# Patient Record
Sex: FEMALE | Race: WHITE | NOT HISPANIC OR LATINO | Employment: UNEMPLOYED | URBAN - METROPOLITAN AREA
[De-identification: names, ages, dates, MRNs, and addresses within clinical notes are randomized per-mention and may not be internally consistent; named-entity substitution may affect disease eponyms.]

---

## 2017-11-24 ENCOUNTER — ALLSCRIPTS OFFICE VISIT (OUTPATIENT)
Dept: OTHER | Facility: OTHER | Age: 37
End: 2017-11-24

## 2017-11-25 NOTE — PROGRESS NOTES
Assessment    1  Encounter for annual routine gynecological examination (V72 31) (Z01 419)    Plan  Obtain patient's old records so that we can get better information on her history of abnormal Pap smears and ovarian cyst   Patient will be notified only if there is further intervention that is needed  Discussion/Summary  health maintenance visit healthy adult female Currently, she eats a healthy diet  cervical cancer screening is current Advice and education were given regarding contraception  The patient has the current Goals: Patient is at goal    Patient is able to Self-Care  PATIENT EDUCATION RECORD  Indication for Services: Routine annual gyn exam    Possible side effects of new medications were reviewed with the patient/guardian today  The treatment plan was reviewed with the patient/guardian  The patient/guardian understands and agrees with the treatment plan      Chief Complaint    1  Visit For: Preventive General Multisystem Exam    History of Present Illness  GYN HM, [facility]: The patient is being seen for a health maintenance and gynecology evaluation  General Health: The patient's health since the last visit is described as good  She has regular dental visits  -- She denies vision problems  -- She denies hearing loss  Immunizations status: up to date  Lifestyle:  She consumes a diverse and healthy diet  -- She does not have any weight concerns  -- She exercises regularly  -- She does not use tobacco -- She denies alcohol use  -- She denies drug use  Reproductive health: the patient is premenopausal--   she reports no menstrual problems  -- she uses no contraception  -- she denies prior pregnancies  Screening: cancer screening reviewed and current  metabolic screening reviewed and current  risk screening reviewed and current  Additional History:  Nolan Pelaez is seen as a new patient today  She has no specific complaints or concerns   She feels she may have had a Pap smear performed this year or last  She has had some abnormal Paps in the past which apparently were simply repeated  She also has had a history of ovarian cysts with follow-up ultrasounds  Unfortunately none of the records are available for review  Review of Systems   Constitutional: No fever, no chills, feels well, no tiredness, no recent weight gain or loss  ENT: no ear ache, no loss of hearing, no nosebleeds or nasal discharge, no sore throat or hoarseness  Cardiovascular: no complaints of slow or fast heart rate, no chest pain, no palpitations, no leg claudication or lower extremity edema  Respiratory: no complaints of shortness of breath, no wheezing, no dyspnea on exertion, no orthopnea or PND  Breasts: no complaints of breast pain, breast lump or nipple discharge  Gastrointestinal: no complaints of abdominal pain, no constipation, no nausea or diarrhea, no vomiting, no bloody stools  Genitourinary: no complaints of dysuria, no incontinence, no pelvic pain, no dysmenorrhea, no vaginal discharge or abnormal vaginal bleeding  Musculoskeletal: no complaints of arthralgia, no myalgia, no joint swelling or stiffness, no limb pain or swelling  Integumentary: no complaints of skin rash or lesion, no itching or dry skin, no skin wounds  Neurological: no complaints of headache, no confusion, no numbness or tingling, no dizziness or fainting  Active Problems  1   Atrial fibrillation (427 31) (I48 91)    Past Medical History   · History of self breast exam    Surgical History   · History of Cataract Surgery   · History of Catheter Ablation Atrial Fibrillation    Family History  Family History    · Family history of cardiac disorder (V17 49) (Z82 49)   · Family history of malignant neoplasm (V16 9) (Z80 9)   · Family history of thyroid disease (V18 19) (Z83 49)    Social History     · Always uses seat belt   · Caffeine use (V49 89) (F15 90)   · Has no children   · Never a smoker   · Social alcohol use (Z78 9)    Current Meds   1  Aspirin Low Dose 81 MG TABS; Therapy: (Recorded:17Vap4127) to Recorded   2  Metoprolol Succinate ER 25 MG Oral Tablet Extended Release 24 Hour; Therapy: [id number] to Recorded    Allergies  1  No Known Drug Allergies    Vitals   Recorded: 31VTW2396 23:93BV   Systolic 113, LUE, Sitting   Diastolic 76, LUE, Sitting   Height 5 ft 2 in   Weight 174 lb 6 oz   BMI Calculated 31 89   BSA Calculated 1 8   LMP [id number]       Physical Exam   Constitutional  General appearance: No acute distress, well appearing and well nourished  Neck  Neck: Normal, supple, trachea midline, no masses  Thyroid: Normal, no thyromegaly  Genitourinary  External genitalia: Normal and no lesions appreciated  Vagina: Normal, no lesions or dryness appreciated  Urethra: Normal    Urethral meatus: Normal    Bladder: Normal, soft, non-tender and no prolapse or masses appreciated  Cervix: Normal, no palpable masses  Examination of the cervix revealed normal findings  A Pap smear was not performed  Bimanual exam findings include a patent cervical os  Uterus: Normal, non-tender, not enlarged, and no palpable masses  Adnexa/parametria: Normal, non-tender and no fullness or masses appreciated  Chest  Breasts: Normal and no dimpling or skin changes noted  Abdomen  Abdomen: Normal, non-tender, and no organomegaly noted  Liver and spleen: No hepatomegaly or splenomegaly  Examination for hernias: No hernias appreciated  Lymphatic  Palpation of lymph nodes in neck, axillae, groin and/or other locations: No lymphadenopathy or masses noted  Skin  Skin and subcutaneous tissue: Normal skin turgor and no rashes  Palpation of skin and subcutaneous tissue: Normal    Psychiatric  Orientation to person, place, and time: Normal    Mood and affect: Normal        Signatures   Electronically signed by :  Brooklynn Tran MD; Nov 24 2017 12:28PM EST                       (Author)

## 2017-11-29 ENCOUNTER — GENERIC CONVERSION - ENCOUNTER (OUTPATIENT)
Dept: OTHER | Facility: OTHER | Age: 37
End: 2017-11-29

## 2017-11-29 DIAGNOSIS — R10.2 PELVIC AND PERINEAL PAIN: ICD-10-CM

## 2017-12-18 ENCOUNTER — HOSPITAL ENCOUNTER (OUTPATIENT)
Dept: ULTRASOUND IMAGING | Facility: CLINIC | Age: 37
Discharge: HOME | End: 2017-12-18
Attending: OBSTETRICS & GYNECOLOGY
Payer: COMMERCIAL

## 2017-12-18 DIAGNOSIS — R10.2 PELVIC AND PERINEAL PAIN: ICD-10-CM

## 2017-12-18 PROCEDURE — 76856 US EXAM PELVIC COMPLETE: CPT

## 2017-12-18 PROCEDURE — 76830 TRANSVAGINAL US NON-OB: CPT

## 2017-12-22 ENCOUNTER — ALLSCRIPTS OFFICE VISIT (OUTPATIENT)
Dept: OTHER | Facility: OTHER | Age: 37
End: 2017-12-22

## 2017-12-26 ENCOUNTER — GENERIC CONVERSION - ENCOUNTER (OUTPATIENT)
Dept: OTHER | Facility: OTHER | Age: 37
End: 2017-12-26

## 2017-12-28 LAB
ADEQUACY: (HISTORICAL): NORMAL
CLINICIAN PROVIDIED ICD 9 OR 10 (HISTORICAL): NORMAL
COMMENT (HISTORICAL): NORMAL
DIAGNOSIS (HISTORICAL): NORMAL
HPV HIGH RISK (HISTORICAL): NEGATIVE
PERFORMED BY (HISTORICAL): NORMAL
TEST INFORMATION (HISTORICAL): NORMAL

## 2018-01-05 RX ORDER — METOPROLOL SUCCINATE 25 MG/1
25 TABLET, EXTENDED RELEASE ORAL DAILY
COMMUNITY

## 2018-01-13 VITALS
WEIGHT: 174.38 LBS | BODY MASS INDEX: 32.09 KG/M2 | HEIGHT: 62 IN | SYSTOLIC BLOOD PRESSURE: 116 MMHG | DIASTOLIC BLOOD PRESSURE: 76 MMHG

## 2018-01-15 NOTE — MISCELLANEOUS
Message   Recorded as Task   Date: 11/28/2017 04:31 PM, Created By: Richardo Leventhal   Task Name: Care Coordination   Assigned To: Alicia Hidalgo   Regarding Patient: Candy Almeida, Status: Active   Comment:    Harley Darling - 28 Nov 2017 4:31 PM     TASK CREATED  Patient's old records were reviewed  The patient is in need of a pelvic ultrasound as well as a Pap smear since there were no Pap smears included in her records  She should make an appointment at the [place] [address] office to have both performed  Ashleigh Dowell - 29 Nov 2017 11:16 AM     TASK EDITED  pt scheduled appt           Active Problems    1  Atrial fibrillation (427 31) (I48 91)   2  Encounter for annual routine gynecological examination (V72 31) (Z01 419)    Current Meds   1  Aspirin Low Dose 81 MG TABS; Therapy: (Recorded:80Byw4996) to Recorded   2  Metoprolol Succinate ER 25 MG Oral Tablet Extended Release 24 Hour; Therapy: [id number] to Recorded    Allergies    1   No Known Drug Allergies    Signatures   Electronically signed by : Anita Birmingham, ; Nov 29 2017 11:16AM EST                       (Author)

## 2018-01-18 ENCOUNTER — GENERIC CONVERSION - ENCOUNTER (OUTPATIENT)
Dept: OTHER | Facility: OTHER | Age: 38
End: 2018-01-18

## 2018-01-23 VITALS
BODY MASS INDEX: 32.04 KG/M2 | DIASTOLIC BLOOD PRESSURE: 66 MMHG | WEIGHT: 174.13 LBS | SYSTOLIC BLOOD PRESSURE: 116 MMHG | HEIGHT: 62 IN

## 2018-01-23 NOTE — MISCELLANEOUS
Message   Recorded as Task   Date: 12/20/2017 08:09 AM, Created By: Nico Guerrero   Task Name: Care Coordination   Assigned To: Alexandra Gabriel   Regarding Patient: Prateek Forde, Status: Active   Comment:    LissetHarley - 20 Dec 2017 8:09 AM     TASK CREATED  Patient also needs an office visit to schedule surgery for removal of the left adnexal cyst    Ashleigh Dowell - 20 Dec 2017 4:18 PM     TASK REASSIGNED: Previously Assigned To [facility] - 26 Dec 2017 12:32 PM     TASK REPLIED TO: Previously Assigned To Gemma Gonsales  She has an appointment with me on 01/02/2018        Active Problems    1  Atrial fibrillation (427 31) (I48 91)   2  Cervical cancer screening (V76 2) (Z12 4)   3  Encounter for annual routine gynecological examination (V72 31) (Z01 419)   4  Female pelvic pain (625 9) (R10 2)   5  Ovarian enlargement, left (620 8) (N83 8)   6  Screening for HPV (human papillomavirus) (V73 81) (Z11 51)    Current Meds   1  Aspirin Low Dose 81 MG TABS; Therapy: (Recorded:28Qwz4250) to Recorded   2  Metoprolol Succinate ER 25 MG Oral Tablet Extended Release 24 Hour; Therapy: [id number] to Recorded    Allergies    1   No Known Drug Allergies    Signatures   Electronically signed by : Surya Morales, ; Dec 26 2017 12:45PM EST                       (Author)

## 2018-01-31 ENCOUNTER — HOSPITAL ENCOUNTER (OUTPATIENT)
Dept: NON INVASIVE DIAGNOSTICS | Facility: HOSPITAL | Age: 38
Discharge: HOME | End: 2018-01-31
Attending: OBSTETRICS & GYNECOLOGY
Payer: COMMERCIAL

## 2018-01-31 ENCOUNTER — APPOINTMENT (OUTPATIENT)
Dept: LAB | Facility: HOSPITAL | Age: 38
End: 2018-01-31
Attending: OBSTETRICS & GYNECOLOGY
Payer: COMMERCIAL

## 2018-01-31 ENCOUNTER — TRANSCRIBE ORDERS (OUTPATIENT)
Dept: ADMINISTRATIVE | Facility: HOSPITAL | Age: 38
End: 2018-01-31

## 2018-01-31 DIAGNOSIS — N94.9 DISEASE OF FEMALE GENITAL ORGANS: ICD-10-CM

## 2018-01-31 DIAGNOSIS — N94.9 DISEASE OF FEMALE GENITAL ORGANS: Primary | ICD-10-CM

## 2018-01-31 LAB
ABO GROUP BLD: NORMAL
ATRIAL RATE: 67 BPM
BASOPHILS # BLD AUTO: 0.03 THOUSANDS/ΜL (ref 0–0.1)
BASOPHILS NFR BLD AUTO: 0 % (ref 0–1)
BLD GP AB SCN SERPL QL: NEGATIVE
EOSINOPHIL # BLD AUTO: 0.12 THOUSAND/ΜL (ref 0–0.61)
EOSINOPHIL NFR BLD AUTO: 2 % (ref 0–6)
ERYTHROCYTE [DISTWIDTH] IN BLOOD BY AUTOMATED COUNT: 14.3 % (ref 11.6–15.1)
HCT VFR BLD AUTO: 45.8 % (ref 34.8–46.1)
HGB BLD-MCNC: 14.8 G/DL (ref 11.5–15.4)
LYMPHOCYTES # BLD AUTO: 2.22 THOUSANDS/ΜL (ref 0.6–4.47)
LYMPHOCYTES NFR BLD AUTO: 31 % (ref 14–44)
MCH RBC QN AUTO: 28.1 PG (ref 26.8–34.3)
MCHC RBC AUTO-ENTMCNC: 32.3 G/DL (ref 31.4–37.4)
MCV RBC AUTO: 87 FL (ref 82–98)
MONOCYTES # BLD AUTO: 0.33 THOUSAND/ΜL (ref 0.17–1.22)
MONOCYTES NFR BLD AUTO: 5 % (ref 4–12)
NEUTROPHILS # BLD AUTO: 4.46 THOUSANDS/ΜL (ref 1.85–7.62)
NEUTS SEG NFR BLD AUTO: 62 % (ref 43–75)
P AXIS: 38 DEGREES
PLATELET # BLD AUTO: 220 THOUSANDS/UL (ref 149–390)
PMV BLD AUTO: 9.6 FL (ref 8.9–12.7)
PR INTERVAL: 174 MS
QRS AXIS: 69 DEGREES
QRSD INTERVAL: 88 MS
QT INTERVAL: 406 MS
QTC INTERVAL: 429 MS
RBC # BLD AUTO: 5.26 MILLION/UL (ref 3.81–5.12)
RH BLD: POSITIVE
SPECIMEN EXPIRATION DATE: NORMAL
T WAVE AXIS: 25 DEGREES
VENTRICULAR RATE: 67 BPM
WBC # BLD AUTO: 7.16 THOUSAND/UL (ref 4.31–10.16)

## 2018-01-31 PROCEDURE — 86900 BLOOD TYPING SEROLOGIC ABO: CPT

## 2018-01-31 PROCEDURE — 85025 COMPLETE CBC W/AUTO DIFF WBC: CPT

## 2018-01-31 PROCEDURE — 93005 ELECTROCARDIOGRAM TRACING: CPT

## 2018-01-31 PROCEDURE — 86901 BLOOD TYPING SEROLOGIC RH(D): CPT

## 2018-01-31 PROCEDURE — 36415 COLL VENOUS BLD VENIPUNCTURE: CPT

## 2018-01-31 PROCEDURE — 86850 RBC ANTIBODY SCREEN: CPT

## 2018-02-01 ENCOUNTER — ANESTHESIA EVENT (OUTPATIENT)
Dept: PERIOP | Facility: HOSPITAL | Age: 38
End: 2018-02-01
Payer: COMMERCIAL

## 2018-02-01 RX ORDER — SODIUM CHLORIDE, SODIUM LACTATE, POTASSIUM CHLORIDE, CALCIUM CHLORIDE 600; 310; 30; 20 MG/100ML; MG/100ML; MG/100ML; MG/100ML
20 INJECTION, SOLUTION INTRAVENOUS CONTINUOUS
Status: CANCELLED | OUTPATIENT
Start: 2018-02-02

## 2018-02-01 NOTE — PRE-PROCEDURE INSTRUCTIONS
Pre-Surgery Instructions:   Medication Instructions    aspirin 81 MG tablet Patient was instructed by Physician and understands   metoprolol succinate (TOPROL-XL) 25 mg 24 hr tablet Instructed patient per Anesthesia Guidelines  Before your operation, you play an important role in decreasing your risk for infection by washing with special antiseptic soap  This is an effective way to reduce bacteria on the skin which may help to prevent infections at the surgical site  Please read the following directions in advance  1  In the week before your operation purchase a 4 ounce bottle of antiseptic soap containing chlorhexidine gluconate 4%  Some brand names include: Aplicare, Endure, and Hibiclens  The cost is usually less than $5 00  · For your convenience, the [address] [place] [address] carries the soap  · It may also be available at your doctor's office or pre-admission testing center, and at most retail pharmacies  · If you are allergic or sensitive to soaps containing chlorhexidine gluconate (CHG), please let your doctor know so another antiseptic soap can be suggested  · CHG antiseptic soap is for external use only  2      The day before your operation follow these directions carefully to get ready  · Place clean lines (sheets) on your bed; you should sleep on clean sheets after your evening shower  · Get clean towels and washcloths ready - you need enough for 2 showers  · Set aside clean underwear, pajamas, and clothing to wear after the shower  Reminders:  · DO NOT use any other soap or body rinse on your skin during or after the antiseptic showers  · DO NOT use lotion , powder, deodorant, or perfume/aftershave of any kind on your skin after your antiseptic shower  · DO NOT shave any body parts in the 24 hours/the day before your operation  · DO NOT get the antiseptic soap in your eyes, ears, nose, mouth, or vaginal area      3      You will need to shower the night before AND the morning of your Surgery  Shower 1:  · The evening before your operation, take the fist shower  · First, shampoo your hair with regular shampoo and rinse it completely before you use the anitseptic soap  After washing and rinsing your hair, rinse your body  · Next, use a clean wash cloth to apply the antiseptic soap and wash your body from the neck down to your toes using 1/2 bottle of the antiseptic soap  You will use the other 1/2 bottle for the second shower  · Clean the area where your incision will be; later this area well for about 2 minutes  · If you ar having head or neck surgery, wash areas with the antiseptic soap  · Rinse yourself completely with running water  · Use a clean towel to dry off  · Wear clean underwear and clothing/pajamas  Shower 2:  · The Morning of your operation, take the second shower following the same steps as Shower 1 using the second 1/2 of the bottle of antiseptic soap  · Use clean cloths and towels to was and dry yourself off  · Wear clean underwear and clothing

## 2018-02-02 ENCOUNTER — ANESTHESIA (OUTPATIENT)
Dept: PERIOP | Facility: HOSPITAL | Age: 38
End: 2018-02-02
Payer: COMMERCIAL

## 2018-02-02 ENCOUNTER — HOSPITAL ENCOUNTER (OUTPATIENT)
Facility: HOSPITAL | Age: 38
Setting detail: OUTPATIENT SURGERY
Discharge: HOME | End: 2018-02-02
Attending: OBSTETRICS & GYNECOLOGY | Admitting: OBSTETRICS & GYNECOLOGY
Payer: COMMERCIAL

## 2018-02-02 VITALS
HEART RATE: 56 BPM | WEIGHT: 174 LBS | TEMPERATURE: 97.7 F | RESPIRATION RATE: 16 BRPM | DIASTOLIC BLOOD PRESSURE: 57 MMHG | BODY MASS INDEX: 32.02 KG/M2 | SYSTOLIC BLOOD PRESSURE: 104 MMHG | OXYGEN SATURATION: 95 % | HEIGHT: 62 IN

## 2018-02-02 DIAGNOSIS — N94.9 UNSPECIFIED CONDITION ASSOCIATED WITH FEMALE GENITAL ORGANS AND MENSTRUAL CYCLE (CODE): ICD-10-CM

## 2018-02-02 PROBLEM — I48.91 ATRIAL FIBRILLATION (HCC): Status: ACTIVE | Noted: 2017-11-24

## 2018-02-02 PROBLEM — R10.2 FEMALE PELVIC PAIN: Status: ACTIVE | Noted: 2017-11-29

## 2018-02-02 LAB — EXT PREGNANCY TEST URINE: NEGATIVE

## 2018-02-02 PROCEDURE — 88305 TISSUE EXAM BY PATHOLOGIST: CPT | Performed by: PATHOLOGY

## 2018-02-02 PROCEDURE — 81025 URINE PREGNANCY TEST: CPT | Performed by: OBSTETRICS & GYNECOLOGY

## 2018-02-02 PROCEDURE — 88305 TISSUE EXAM BY PATHOLOGIST: CPT | Performed by: OBSTETRICS & GYNECOLOGY

## 2018-02-02 PROCEDURE — 58662 LAPAROSCOPY EXCISE LESIONS: CPT | Performed by: OBSTETRICS & GYNECOLOGY

## 2018-02-02 RX ORDER — MAGNESIUM HYDROXIDE 1200 MG/15ML
LIQUID ORAL AS NEEDED
Status: DISCONTINUED | OUTPATIENT
Start: 2018-02-02 | End: 2018-02-02 | Stop reason: HOSPADM

## 2018-02-02 RX ORDER — ONDANSETRON 2 MG/ML
INJECTION INTRAMUSCULAR; INTRAVENOUS AS NEEDED
Status: DISCONTINUED | OUTPATIENT
Start: 2018-02-02 | End: 2018-02-02 | Stop reason: SURG

## 2018-02-02 RX ORDER — IBUPROFEN 600 MG/1
600 TABLET ORAL EVERY 6 HOURS PRN
Status: DISCONTINUED | OUTPATIENT
Start: 2018-02-02 | End: 2018-02-02 | Stop reason: HOSPADM

## 2018-02-02 RX ORDER — PROPOFOL 10 MG/ML
INJECTION, EMULSION INTRAVENOUS AS NEEDED
Status: DISCONTINUED | OUTPATIENT
Start: 2018-02-02 | End: 2018-02-02 | Stop reason: SURG

## 2018-02-02 RX ORDER — KETOROLAC TROMETHAMINE 30 MG/ML
INJECTION, SOLUTION INTRAMUSCULAR; INTRAVENOUS AS NEEDED
Status: DISCONTINUED | OUTPATIENT
Start: 2018-02-02 | End: 2018-02-02 | Stop reason: SURG

## 2018-02-02 RX ORDER — FENTANYL CITRATE 50 UG/ML
INJECTION, SOLUTION INTRAMUSCULAR; INTRAVENOUS AS NEEDED
Status: DISCONTINUED | OUTPATIENT
Start: 2018-02-02 | End: 2018-02-02 | Stop reason: SURG

## 2018-02-02 RX ORDER — SODIUM CHLORIDE, SODIUM LACTATE, POTASSIUM CHLORIDE, CALCIUM CHLORIDE 600; 310; 30; 20 MG/100ML; MG/100ML; MG/100ML; MG/100ML
20 INJECTION, SOLUTION INTRAVENOUS CONTINUOUS
Status: DISCONTINUED | OUTPATIENT
Start: 2018-02-02 | End: 2018-02-02 | Stop reason: HOSPADM

## 2018-02-02 RX ORDER — FENTANYL CITRATE/PF 50 MCG/ML
25 SYRINGE (ML) INJECTION
Status: DISCONTINUED | OUTPATIENT
Start: 2018-02-02 | End: 2018-02-02 | Stop reason: HOSPADM

## 2018-02-02 RX ORDER — ROCURONIUM BROMIDE 10 MG/ML
INJECTION, SOLUTION INTRAVENOUS AS NEEDED
Status: DISCONTINUED | OUTPATIENT
Start: 2018-02-02 | End: 2018-02-02 | Stop reason: SURG

## 2018-02-02 RX ORDER — METOCLOPRAMIDE HYDROCHLORIDE 5 MG/ML
10 INJECTION INTRAMUSCULAR; INTRAVENOUS ONCE AS NEEDED
Status: DISCONTINUED | OUTPATIENT
Start: 2018-02-02 | End: 2018-02-02 | Stop reason: HOSPADM

## 2018-02-02 RX ORDER — MIDAZOLAM HYDROCHLORIDE 1 MG/ML
INJECTION INTRAMUSCULAR; INTRAVENOUS AS NEEDED
Status: DISCONTINUED | OUTPATIENT
Start: 2018-02-02 | End: 2018-02-02 | Stop reason: SURG

## 2018-02-02 RX ORDER — ACETAMINOPHEN 325 MG/1
650 TABLET ORAL EVERY 6 HOURS PRN
Status: DISCONTINUED | OUTPATIENT
Start: 2018-02-02 | End: 2018-02-02 | Stop reason: HOSPADM

## 2018-02-02 RX ADMIN — KETOROLAC TROMETHAMINE 30 MG: 30 INJECTION, SOLUTION INTRAMUSCULAR at 12:53

## 2018-02-02 RX ADMIN — FENTANYL CITRATE 50 MCG: 50 INJECTION, SOLUTION INTRAMUSCULAR; INTRAVENOUS at 12:05

## 2018-02-02 RX ADMIN — FENTANYL CITRATE 50 MCG: 50 INJECTION, SOLUTION INTRAMUSCULAR; INTRAVENOUS at 13:23

## 2018-02-02 RX ADMIN — ROCURONIUM BROMIDE 40 MG: 10 INJECTION INTRAVENOUS at 12:10

## 2018-02-02 RX ADMIN — PROPOFOL 150 MG: 10 INJECTION, EMULSION INTRAVENOUS at 12:08

## 2018-02-02 RX ADMIN — MIDAZOLAM HYDROCHLORIDE 2 MG: 1 INJECTION, SOLUTION INTRAMUSCULAR; INTRAVENOUS at 12:01

## 2018-02-02 RX ADMIN — ONDANSETRON 4 MG: 2 INJECTION INTRAMUSCULAR; INTRAVENOUS at 12:52

## 2018-02-02 RX ADMIN — SODIUM CHLORIDE, SODIUM LACTATE, POTASSIUM CHLORIDE, AND CALCIUM CHLORIDE 20 ML/HR: .6; .31; .03; .02 INJECTION, SOLUTION INTRAVENOUS at 11:05

## 2018-02-02 NOTE — H&P (VIEW-ONLY)
Discussion/Summary   Discussion Summary:    I discussed with Edel Sifuentes and her mom, the fact that for years there has been a left adnexal structure, ovary versus fallopian tube that is being watched  The most recent ultrasound seems to indicate this may be ovarian in origin, and seems to be enlarging  It is also septated  Because of these issues, I am recommending that Edel Sifuentes undergo a laparoscopy with removal of the structure so that it can be studied and at least eliminated for her future health  We reviewed the technique which would be laparoscopy  A pamphlet pill straighten this in addition to an instruction sheet regarding the least the most common risks was also given to her  The patient and I reviewed the risks and benefits, pros and cons of the procedure, including alternatives  Edel Sifuentes is in favor of having this removed and would prefer to have the procedure performed in [facility]  This would not be till the 1st Friday in February however this is been very chronic and I feel it is appropriate for her to wait till then  She will return in January to sign papers and finalize the surgical date  Counseling Documentation With Imm: The patient, patient's family was counseled regarding diagnostic results,-- prognosis,-- patient and family education,-- impressions,-- risks and benefits of treatment options  total time of encounter was 27 minutes-- and-- 19 minutes was spent counseling  Chief Complaint   Chief Complaint Free Text Note Form: PT HERE FOR US F/U AND PAP  History of Present Illness   HPI: Edel Sifuentes is here today for a Pap smear since we do not have any records of the Pap over the past several years and discussion about her slowly enlarging left adnexal cyst       Review of Systems   Focused-Female:      Constitutional: No fever, no chills, feels well, no tiredness, no recent weight gain or loss        ENT: no ear ache, no loss of hearing, no nosebleeds or nasal discharge, no sore throat or hoarseness  Cardiovascular: no complaints of slow or fast heart rate, no chest pain, no palpitations, no leg claudication or lower extremity edema  Respiratory: no complaints of shortness of breath, no wheezing, no dyspnea on exertion, no orthopnea or PND  Breasts: no complaints of breast pain, breast lump or nipple discharge  Gastrointestinal: no complaints of abdominal pain, no constipation, no nausea or diarrhea, no vomiting, no bloody stools  Genitourinary: no complaints of dysuria, no incontinence, no pelvic pain, no dysmenorrhea, no vaginal discharge or abnormal vaginal bleeding  Musculoskeletal: no complaints of arthralgia, no myalgia, no joint swelling or stiffness, no limb pain or swelling  Integumentary: no complaints of skin rash or lesion, no itching or dry skin, no skin wounds  Neurological: no complaints of headache, no confusion, no numbness or tingling, no dizziness or fainting  Active Problems   1  Atrial fibrillation (427 31) (I48 91)   2  Cervical cancer screening (V76 2) (Z12 4)   3  Encounter for annual routine gynecological examination (V72 31) (Z01 419)   4  Female pelvic pain (625 9) (R10 2)   5  Ovarian enlargement, left (620 8) (N83 8)   6  Screening for HPV (human papillomavirus) (V73 81) (Z11 51)    Past Medical History   1  History of self breast exam    Surgical History   1  History of Cataract Surgery   2  History of Catheter Ablation Atrial Fibrillation    Family History   Family History    1  Family history of cardiac disorder (V17 49) (Z82 49)   2  Family history of malignant neoplasm (V16 9) (Z80 9)   3  Family history of thyroid disease (V18 19) (Z83 49)    Social History    · Always uses seat belt   · Caffeine use (V49 89) (F15 90)   · Has no children   · Never a smoker   · Social alcohol use (Z78 9)    Current Meds    1  Aspirin Low Dose 81 MG TABS; Therapy: (Recorded:24Nov2017) to Recorded   2   Metoprolol Succinate ER 25 MG Oral Tablet Extended Release 24 Hour; Therapy: [id number] to Recorded    Allergies   1  No Known Drug Allergies    Physical Exam        Constitutional      General appearance: No acute distress, well appearing and well nourished  Neck      Neck: Normal, supple, trachea midline, no masses  Thyroid: Normal, no thyromegaly  Pulmonary      Respiratory effort: No increased work of breathing or signs of respiratory distress  Auscultation of lungs: Clear to auscultation  Cardiovascular      Auscultation of heart: Normal rate and rhythm, normal S1 and S2, no murmurs  Peripheral vascular exam: Normal pulses Throughout  Genitourinary      External genitalia: Normal and no lesions appreciated  Vagina: Normal, no lesions or dryness appreciated  Urethra: Normal        Urethral meatus: Normal        Bladder: Normal, soft, non-tender and no prolapse or masses appreciated  Cervix: Normal, no palpable masses  Examination of the cervix revealed normal findings  A Pap smear was performed  Bimanual exam findings include a patent cervical os  Psychiatric      Orientation to person, place, and time: Normal        Mood and affect: Normal        Future Appointments      Date/Time Provider Specialty Site   02/16/2018 12:30 PM Sariah Cochran MD Obstetrics/Gynecology [facility]   03/07/2018 08:30 AM [facility]     Signatures    Electronically signed by :  Dee Dee Fernando MD; Jan 18 2018  9:47AM EST                       (Author)

## 2018-02-02 NOTE — DISCHARGE INSTRUCTIONS
Laparoscopic Excision of Ovarian Cysts   WHAT YOU NEED TO KNOW:   Laparoscopic excision is surgery to remove a cyst on your ovary  DISCHARGE INSTRUCTIONS:   Medicines:   · Antibiotics: This medicine is given to fight or prevent an infection caused by bacteria  Always take your antibiotics exactly as ordered by your healthcare provider  Do not stop taking your medicine unless directed by your healthcare provider  Never save antibiotics or take leftover antibiotics that were given to you for another illness  · Pain medicine: You may be given a prescription medicine to decrease pain  Do not wait until the pain is severe before you take this medicine  · Take your medicine as directed  Contact your healthcare provider if you think your medicine is not helping or if you have side effects  Tell him or her if you are allergic to any medicine  Keep a list of the medicines, vitamins, and herbs you take  Include the amounts, and when and why you take them  Bring the list or the pill bottles to follow-up visits  Carry your medicine list with you in case of an emergency  Follow up with your healthcare provider as directed:  Write down your questions so you remember to ask them during your visits  Contact your healthcare provider if:   · You have stomach cramps  · You have a fever  · You have questions or concerns about your condition or care  Seek care immediately or call 911 if:   · You have bleeding that does not stop  · You have severe abdominal pain that does not go away, even with medicine  · You feel lightheaded and short of breath  · You have chest pain when you take a deep breath or cough, or you cough up blood  · Your arm or leg feels warm, tender, and painful  It may look swollen and red  © 2017 [facility] Information is for End User's use only and may not be sold, redistributed or otherwise used for commercial purposes   All illustrations and images included in CareNotes® are the copyrighted property of A D A M , Inc  or [employer]  The above information is an  only  It is not intended as medical advice for individual conditions or treatments  Talk to your doctor, nurse or pharmacist before following any medical regimen to see if it is safe and effective for you

## 2018-02-02 NOTE — ANESTHESIA PREPROCEDURE EVALUATION
Review of Systems/Medical History  Patient summary reviewed  Chart reviewed      Cardiovascular  Exercise tolerance: good,  Dysrhythmias, atrial fibrillation,    Pulmonary  Negative pulmonary ROS        GI/Hepatic  Negative GI/hepatic ROS          Negative  ROS        Endo/Other  Negative endo/other ROS      GYN      Comment: Ovarian cyst     Hematology  Negative hematology ROS      Musculoskeletal  Negative musculoskeletal ROS        Neurology  Negative neurology ROS      Psychology   Anxiety,              Physical Exam    Airway    Mallampati score: II  TM Distance: >3 FB  Neck ROM: full     Dental   No notable dental hx     Cardiovascular  Rhythm: regular, Rate: normal, Cardiovascular exam normal    Pulmonary  Pulmonary exam normal Breath sounds clear to auscultation,     Other Findings        Anesthesia Plan  ASA Score- 2     Anesthesia Type- general with ASA Monitors  Additional Monitors:   Airway Plan: ETT  Plan Factors-    Induction- intravenous  Postoperative Plan- Plan for postoperative opioid use  Informed Consent- Anesthetic plan and risks discussed with patient

## 2018-02-02 NOTE — OP NOTE
OPERATIVE REPORT  PATIENT NAME: Mara Jennings    :  1980  MRN: [id number]  Pt Location: [facility] OR ROOM 03    SURGERY DATE: 2018    Surgeon(s) and Role:     Johnosn Radford MD - Primary    Preop Diagnosis:  Left adnexal mass    Post-Op Diagnosis Codes:       Right para ovarian cyst     Procedure(s) (LRB):  LAPAROSCOPIC PARAOVARIAN  CYSTECTOMY (Right)    Specimen(s):  ID Type Source Tests Collected by Time Destination   1 : right paraovarian cyst Tissue Ovary, Cyst TISSUE EXAM Lillian Durant MD 2018 1244        Estimated Blood Loss:   Minimal    Drains:  [REMOVED] Urethral Catheter Latex 12 Fr  (Removed)   Removed 18 1217   Amt returned on insertion(mL) 100 mL 2018 12:34 PM   Number of days: 0       Anesthesia Type:   Choice    Operative Indications:  Left adnexal mass    Operative Findings:  Enlarged right paraovarian cyst measuring approximately 6 x 6 cm, both adnexa otherwise within normal limits, no evidence of pathology for the broad ligament cul-de-sac or bladder flap  Posterior wall of the uterus showing a 2 cm subserosal fibroid  Complications:   None    Procedure and Technique:  Having identified the patient Mara Jennings on the operating room table, the patient was placed under general anesthesia, carefully positioned in the dorsal lithotomy position, prepped and draped usual sterile fashion  After time-out was obtained, the urine drained from the bladder equal the 150 cc and was found to be clear  A Brooks's cannula was next placed in the cervical os for uterine manipulation and held there with a single-tooth tenaculum  Attention was now turned to the abdomen where by a 5 mm infraumbilical incision was made by sharp knife dissection through which a 5 mm trocar and camera were directly viewed into the abdominal cavity  Safe placement within the abdominal cavity was identified by noting immediate insertion site to be free of any gross trauma    The patient was positioned in Trendelenburg position and a 5 mm suprapubic midline incision was made through which a 2nd 5 mm trocar was directly viewed into the abdominal cavity  With the manipulating  probe in place observation of the pelvic cavity immediately showed enlargement of the right fallopian  The left adnexa in fact was within normal limits with respect to the ovary and fallopian tube  The left ovarian fossa and sidewall were also within normal limits  The right ovary was within normal limits but a enlarged 6 x 6 cm right paraovarian cyst was evident  No adhesions or excrescences on the surface of the right para ovarian cyst was noted  The right ovarian fossa likewise was the within normal limits  Both ureters were seen to be peristalsing well  The cul-de-sac and bladder flap were free of any pathology as were the broad ligaments  At this point a left lower quadrant 5 mm incision was made through which a 3rd 5 mm trocar was directly viewed into the abdominal cavity  Using a combination of cautery and cutting power, the right para ovarian cyst capsule was opened and clear fluid was allowed to drain  A Cherry grasper was used to remove the cyst from within which was eventually excised and sent for surgical pathology  Hemostasis was maintained with monopolar cautery  At this point no other pathology was noted and irrigation was performed  Hemostasis was adequate and the procedure was concluded  The patient was taken out Trendelenburg position, the carbon dioxide gas was expressed manually  After removal of the 3 trocars the incisions were closed using 3-0 Monocryl suture  Sponge needle and instrument counting at this time coincided with the preoperative count  The patient tolerated procedure well was taken the PACU         I was present for the entire procedure and A qualified resident physician was not available    Patient Disposition:  PACU     SIGNATURE: Dom Espinoza MD  DATE: February 2, 2018  TIME: 1:04 PM

## 2018-02-16 ENCOUNTER — OFFICE VISIT (OUTPATIENT)
Dept: OBGYN CLINIC | Facility: CLINIC | Age: 38
End: 2018-02-16

## 2018-02-16 VITALS
DIASTOLIC BLOOD PRESSURE: 72 MMHG | WEIGHT: 178.4 LBS | SYSTOLIC BLOOD PRESSURE: 116 MMHG | BODY MASS INDEX: 32.83 KG/M2 | HEIGHT: 62 IN

## 2018-02-16 DIAGNOSIS — Z09 FOLLOW-UP EXAMINATION AFTER GYNECOLOGICAL SURGERY: Primary | ICD-10-CM

## 2018-02-16 PROCEDURE — 99024 POSTOP FOLLOW-UP VISIT: CPT | Performed by: OBSTETRICS & GYNECOLOGY

## 2018-02-16 NOTE — PROGRESS NOTES
Roe Shah is here today following an uneventful   Removal of a para ovarian cyst   She is doing well and offers no complaints or concerns at this time  /72 (BP Location: Left arm, Patient Position: Sitting, Cuff Size: Standard)   Ht 5' 2" (1 575 m)   Wt 80 9 kg (178 lb 6 4 oz)   LMP 02/02/2018   BMI 32 63 kg/m²   Review of Systems:  Skin: No rashes or discolorations of any concern  RESP: Denies SOB, no cough  CV: Denies chest pain or palpitations  GI: Denies abdominal pain, heartburn, nausea, vomiting, changes in bowel habits  : Denies dysuria, frequency, CVA tenderness, incontinence and hematuria  Genitalia: Denies abnormal vaginal discharge, external lesions, rashes, pelvic pain, pressure, abnormal bleeding  Rectal:  Denies pain, bleeding, hemorrhoids,    Her incisions are healing well with no signs of disruption or infection  Her pelvic exam reveals no abnormalities or tenderness   We reviewed the surgical findings and the pathology from the procedure  Recommendations are  to resume all normal activity   Patient is to return  for her annual visit

## 2018-11-09 ENCOUNTER — ANNUAL EXAM (OUTPATIENT)
Dept: OBGYN CLINIC | Facility: CLINIC | Age: 38
End: 2018-11-09
Payer: COMMERCIAL

## 2018-11-09 VITALS
WEIGHT: 198.4 LBS | BODY MASS INDEX: 36.51 KG/M2 | HEIGHT: 62 IN | SYSTOLIC BLOOD PRESSURE: 118 MMHG | DIASTOLIC BLOOD PRESSURE: 64 MMHG

## 2018-11-09 DIAGNOSIS — N91.2 AMENORRHEA: Primary | ICD-10-CM

## 2018-11-09 DIAGNOSIS — Z01.419 ENCOUNTER FOR GYNECOLOGICAL EXAMINATION WITHOUT ABNORMAL FINDING: ICD-10-CM

## 2018-11-09 LAB — SL AMB POCT URINE HCG: NEGATIVE

## 2018-11-09 PROCEDURE — 99395 PREV VISIT EST AGE 18-39: CPT | Performed by: OBSTETRICS & GYNECOLOGY

## 2018-11-09 PROCEDURE — 81025 URINE PREGNANCY TEST: CPT | Performed by: OBSTETRICS & GYNECOLOGY

## 2018-11-09 NOTE — PROGRESS NOTES
CC:  Annual exam    HPI: Fawn Marinelli presents for routine annual visit  She states that since August, she has not had a menstrual cycle  She does not use protection and could be pregnant  She has yet to check a pregnancy test   We did run a urine pregnancy test here which was negative  The patient has had several episodes in the past where she would miss several months of cycles  Past Medical History:  Past Medical History:   Diagnosis Date    Adnexal cyst     left    History of atrial fibrillation     ablation done, takes metoprolol       Past Surgical History:  Past Surgical History:   Procedure Laterality Date    CARDIAC ELECTROPHYSIOLOGY STUDY AND ABLATION      CATARACT EXTRACTION      OTHER SURGICAL HISTORY      catheter ablation atrial fibrillation    OVARIAN CYST SURGERY Right 2/2/2018    Procedure: LAPAROSCOPIC PARAOVARIAN  CYSTECTOMY;  Surgeon: Dyllan Muñoz MD;  Location: [facility];  Service: Gynecology       Past OB/Gyn History:  Menstrual cycles as discussed under HPI  Denies any history of sexually transmitted infection  No history of abnormal pap smears  Her last pap smear was 2017  ALLERGIES: No Known Allergies    MEDS:   Current Outpatient Prescriptions:     aspirin 81 MG tablet    metoprolol succinate (TOPROL-XL) 25 mg 24 hr tablet    Family History:  Family History   Problem Relation Age of Onset    Heart disease Father     Hypertension Maternal Grandmother     Heart disease Family     Cancer Family     Thyroid disease Family        Social History:  Social History     Social History    Marital status: /Civil Union     Spouse name: N/A    Number of children: N/A    Years of education: N/A     Occupational History    Not on file       Social History Main Topics    Smoking status: Former Smoker     Packs/day: 1 50     Years: 14 00     Quit date: 01/2012    Smokeless tobacco: Never Used    Alcohol use Yes      Comment: rare    Drug use: No    Sexual activity: Yes     Partners: Male     Birth control/ protection: None     Other Topics Concern    Not on file     Social History Narrative    Always uses seatbelt    Caffeine use             Review of Systems:  Gen:   Denies fatigue, chills, nausea, vomiting, fever  Skin: No rashes or discolorations of any concern  RESP: Denies SOB, no cough  CV: Denies chest pain or palpitations  Breasts: Denies masses, pain, skin changes and nipple discharge  GI: Denies abdominal pain, heartburn, nausea, vomiting, changes in bowel habits  : Denies dysuria, frequency, CVA tenderness, incontinence and hematuria  Genitalia: Denies abnormal vaginal discharge, external lesions, rashes, pelvic pain, pressure, abnormal bleeding  Rectal:  Denies pain, bleeding, hemorrhoids,    Physical Exam:  /64   Ht 5' 2" (1 575 m)   Wt 90 kg (198 lb 6 4 oz)   LMP 08/10/2018 (Exact Date)   BMI 36 29 kg/m²    Gen: The patient was alert and oriented x3, pleasant well-appearing female in no acute distress  Neck:  Unremarkable, full but with no  lymphadenopathy, no thyromegaly, or tenderness  Breasts: Symmetric  No dominant, discrete, fixed  or suspicious masses are noted  No skin or nipple changes  No palpable axillary nodes  Abd:  Soft, obese, nontender, nondistended, no masses or organomegaly  Back:  No CVA tenderness, no tenderness to palpation along spine  Pelvic  Normal appearing external female genitalia, no visible lesions, no rashes  Vagina is free of discharge, normal vaginal epithelium, no abnormal  lesions, no evidence of prolapse anteriorly or posteriorly  Normal appearing cervix, mobile and nontender  A thin prep pap smear was not obtained  Uterus is normal size, mobile and, nontender  No palpable adnexal masses or tenderness  No anoperineal lesions  Skin:  No concerning lesions  Extremeties: No edema      Assessment & Plan:   1  Routine annual exam      RTO one year orPRN      2  Amenorrhea, patient to have blood work for complete metabolic profile, FSH, LH, prolactin and TSH

## 2018-12-07 ENCOUNTER — OFFICE VISIT (OUTPATIENT)
Dept: FAMILY MEDICINE CLINIC | Facility: CLINIC | Age: 38
End: 2018-12-07
Payer: COMMERCIAL

## 2018-12-07 VITALS
DIASTOLIC BLOOD PRESSURE: 78 MMHG | SYSTOLIC BLOOD PRESSURE: 132 MMHG | BODY MASS INDEX: 36.07 KG/M2 | HEIGHT: 62 IN | HEART RATE: 92 BPM | WEIGHT: 196 LBS | OXYGEN SATURATION: 98 %

## 2018-12-07 DIAGNOSIS — L60.0 INGROWN NAIL OF GREAT TOE OF RIGHT FOOT: ICD-10-CM

## 2018-12-07 DIAGNOSIS — L03.031 PARONYCHIA OF GREAT TOE, RIGHT: Primary | ICD-10-CM

## 2018-12-07 PROCEDURE — 3008F BODY MASS INDEX DOCD: CPT | Performed by: FAMILY MEDICINE

## 2018-12-07 PROCEDURE — 99202 OFFICE O/P NEW SF 15 MIN: CPT | Performed by: FAMILY MEDICINE

## 2018-12-07 RX ORDER — CEPHALEXIN 500 MG/1
500 CAPSULE ORAL 3 TIMES DAILY
Qty: 30 CAPSULE | Refills: 0 | Status: SHIPPED | OUTPATIENT
Start: 2018-12-07 | End: 2018-12-17

## 2018-12-07 NOTE — PATIENT INSTRUCTIONS
Local care to nail  Cut nails as discussed  Use soaks with Epson salts 2-3 times per day over the next 4-5 days  Recheck as needed    If this continues to be a chronic problem we may need to refer you to the podiatrist

## 2018-12-07 NOTE — PROGRESS NOTES
[facility]        NAME: Emmy Medrano is a 41 y o  female  : 1980    MRN: [id number]  DATE: 2018  TIME: 1:21 PM    Assessment and Plan   Paronychia of great toe, right [L03 [id number]]  1  Paronychia of great toe, right  cephalexin (KEFLEX) 500 mg capsule   2  Ingrown nail of great toe of right foot         Atrial fibrillation ([address] )  Patient diagnosed initially a approximately 2005-patient remains in sinus rhythm  She is followed by cardiology Dr Skylar Martinez  She has seen there yearly  Patient Instructions     Patient Instructions   Local care to nail  Cut nails as discussed  Use soaks with Epson salts 2-3 times per day over the next 4-5 days  Recheck as needed  If this continues to be a chronic problem we may need to refer you to the podiatrist           Chief Complaint     Chief Complaint   Patient presents with    New Pt     ingrown toe nail--right foot         History of Present Illness       Patient comes in today to establish care  She has been being seen at Dr Mahsa Linn office  She comes in today with a problem with her right great toe  She has had some pain and swelling for the last 3 weeks  Review of Systems   Review of Systems   Constitutional: Negative for diaphoresis, fatigue and fever  Respiratory: Negative for cough, shortness of breath and wheezing  Cardiovascular: Negative for chest pain and palpitations  Musculoskeletal: Positive for joint swelling  Negative for arthralgias and myalgias  Skin: Positive for color change and wound  Negative for rash           Current Medications       Current Outpatient Prescriptions:     aspirin 81 MG tablet, Take 81 mg by mouth daily, Disp: , Rfl:     metoprolol succinate (TOPROL-XL) 25 mg 24 hr tablet, Take 25 mg by mouth daily, Disp: , Rfl:     cephalexin (KEFLEX) 500 mg capsule, Take 1 capsule (500 mg total) by mouth 3 (three) times a day for 10 days Complete full course, Disp: 30 capsule, Rfl: 0    Current Allergies     Allergies as of 12/07/2018    (No Known Allergies)            The following portions of the patient's history were reviewed and updated as appropriate: allergies, current medications, past family history, past medical history, past social history, past surgical history and problem list      Past Medical History:   Diagnosis Date    Adnexal cyst     left    History of atrial fibrillation     ablation done, takes metoprolol       Past Surgical History:   Procedure Laterality Date    CARDIAC ELECTROPHYSIOLOGY STUDY AND ABLATION      CATARACT EXTRACTION      OTHER SURGICAL HISTORY      catheter ablation atrial fibrillation    OVARIAN CYST SURGERY Right 2/2/2018    Procedure: LAPAROSCOPIC PARAOVARIAN  CYSTECTOMY;  Surgeon: Kumar Bynum MD;  Location:  MAIN OR;  Service: Gynecology       Family History   Problem Relation Age of Onset    Heart disease Father     Hypertension Maternal Grandmother     Heart disease Family     Cancer Family     Thyroid disease Family          Medications have been verified  Objective   /78   Pulse 92   Ht 5' 2" (1 575 m)   Wt 88 9 kg (196 lb)   SpO2 98%   BMI 35 85 kg/m²        Physical Exam     Physical Exam   Constitutional: She appears well-developed and well-nourished  No distress  Cardiovascular: Normal rate, regular rhythm and normal heart sounds  No murmur heard  Pulmonary/Chest: Effort normal and breath sounds normal  No respiratory distress  Musculoskeletal:        Right foot: There is tenderness  There is no deformity  Right great toe  Lateral nail margin was probed and trim with scissor minimal effort required, there may have been a small ingrown portion of the nail removed  There is erythema along the lateral nail margin  Neurological: She is alert

## 2018-12-07 NOTE — ASSESSMENT & PLAN NOTE
Patient diagnosed initially a approximately 2005-patient remains in sinus rhythm  She is followed by cardiology Dr Flores Speaker  She has seen there yearly

## 2018-12-14 LAB
ALBUMIN SERPL-MCNC: 3.9 G/DL (ref 3.5–5.5)
ALBUMIN/GLOB SERPL: 1.9 {RATIO} (ref 1.2–2.2)
ALP SERPL-CCNC: 84 IU/L (ref 39–117)
ALT SERPL-CCNC: 87 IU/L (ref 0–32)
AST SERPL-CCNC: 98 IU/L (ref 0–40)
BILIRUB SERPL-MCNC: 0.7 MG/DL (ref 0–1.2)
BUN SERPL-MCNC: 12 MG/DL (ref 6–20)
BUN/CREAT SERPL: 20 (ref 9–23)
CALCIUM SERPL-MCNC: 9.4 MG/DL (ref 8.7–10.2)
CHLORIDE SERPL-SCNC: 105 MMOL/L (ref 96–106)
CO2 SERPL-SCNC: 26 MMOL/L (ref 20–29)
CREAT SERPL-MCNC: 0.6 MG/DL (ref 0.57–1)
FSH SERPL-ACNC: 1.2 MIU/ML
GLOBULIN SER-MCNC: 2.1 G/DL (ref 1.5–4.5)
GLUCOSE SERPL-MCNC: 157 MG/DL (ref 65–99)
LH SERPL-ACNC: 3.4 MIU/ML
POTASSIUM SERPL-SCNC: 4.3 MMOL/L (ref 3.5–5.2)
PROLACTIN SERPL-MCNC: 28 NG/ML (ref 4.8–23.3)
PROT SERPL-MCNC: 6 G/DL (ref 6–8.5)
SL AMB EGFR AFRICAN AMERICAN: 135 ML/MIN/1.73
SL AMB EGFR NON AFRICAN AMERICAN: 117 ML/MIN/1.73
SODIUM SERPL-SCNC: 142 MMOL/L (ref 134–144)

## 2018-12-17 NOTE — PROGRESS NOTES
Please inform patient she should contact her family physician because of elevated blood glucose in addition to elevated liver function tests  She does have a slightly elevated prolactin level which we will recheck in 3-4 months

## 2018-12-26 ENCOUNTER — TELEPHONE (OUTPATIENT)
Dept: OBGYN CLINIC | Facility: CLINIC | Age: 38
End: 2018-12-26

## 2018-12-26 NOTE — TELEPHONE ENCOUNTER
----- Message from Edward Duron MD sent at 12/17/2018  9:20 AM EST -----  Please inform patient she should contact her family physician because of elevated blood glucose in addition to elevated liver function tests  She does have a slightly elevated prolactin level which we will recheck in 3-4 months

## 2019-01-07 ENCOUNTER — OFFICE VISIT (OUTPATIENT)
Dept: FAMILY MEDICINE CLINIC | Facility: CLINIC | Age: 39
End: 2019-01-07
Payer: COMMERCIAL

## 2019-01-07 VITALS
WEIGHT: 196 LBS | DIASTOLIC BLOOD PRESSURE: 70 MMHG | HEIGHT: 62 IN | SYSTOLIC BLOOD PRESSURE: 120 MMHG | HEART RATE: 152 BPM | RESPIRATION RATE: 16 BRPM | OXYGEN SATURATION: 95 % | BODY MASS INDEX: 36.07 KG/M2

## 2019-01-07 DIAGNOSIS — Z00.00 ENCOUNTER FOR WELLNESS EXAMINATION IN ADULT: Primary | ICD-10-CM

## 2019-01-07 DIAGNOSIS — E66.01 SEVERE OBESITY (BMI 35.0-39.9) WITH COMORBIDITY (HCC): ICD-10-CM

## 2019-01-07 DIAGNOSIS — I48.0 PAROXYSMAL ATRIAL FIBRILLATION (HCC): ICD-10-CM

## 2019-01-07 DIAGNOSIS — L60.0 INGROWN NAIL OF GREAT TOE OF RIGHT FOOT: ICD-10-CM

## 2019-01-07 PROCEDURE — 99395 PREV VISIT EST AGE 18-39: CPT | Performed by: FAMILY MEDICINE

## 2019-01-07 PROCEDURE — 93000 ELECTROCARDIOGRAM COMPLETE: CPT | Performed by: FAMILY MEDICINE

## 2019-01-07 PROCEDURE — 99214 OFFICE O/P EST MOD 30 MIN: CPT | Performed by: FAMILY MEDICINE

## 2019-01-07 PROCEDURE — 3725F SCREEN DEPRESSION PERFORMED: CPT | Performed by: FAMILY MEDICINE

## 2019-01-07 NOTE — PROGRESS NOTES
HPI:  Nanda Sullivan is a 41 y o  female here for her yearly health maintenance exam    Patient Active Problem List   Diagnosis    Female pelvic pain    Atrial fibrillation [facility])     Past Medical History:   Diagnosis Date    Adnexal cyst     left    History of atrial fibrillation     ablation done, takes metoprolol       1  Advanced Directive: no      2  Durable Power of  for Healthcare: no     3  Social History:               Marital History:               Work Status: part time              Drug and alcohol History: none              Alcohol Use: 1-2/month     4  General Health: good              Regular Dental Visits:yes              Vision problems:none              Hearing Loss:none              Immunizations up to date: yes                 Lifestyle:                           Healthy Diet:yes                          Tobacco Use:quit 2012                          Regular exercise:no                          Weight concerns:yes                          Drug abuse: no     5  Reproductive Health (females only)              Premenopausal:yes              Perimenopausal:-              Postmenopausal: -                 Menstrual Problems:-yes- no menses              Contraceptions:no              Sexually Active:no              Pregnancy History:G0     6  PHQ-9 Depression Screening    PHQ-9:    Frequency of the following problems over the past two weeks:       Little interest or pleasure in doing things:  0 - not at all  Feeling down, depressed, or hopeless:  0 - not at all  PHQ-2 Score:  0           Current Outpatient Prescriptions   Medication Sig Dispense Refill    aspirin 81 MG tablet Take 81 mg by mouth daily      metoprolol succinate (TOPROL-XL) 25 mg 24 hr tablet Take 25 mg by mouth daily       No current facility-administered medications for this visit  No Known Allergies    There is no immunization history on file for this patient      Patient Care Team:  Katie Cabrera as PCP - Nhi Orourke MD    Review of Systems   Constitutional: Negative for activity change, appetite change, diaphoresis and fatigue  Respiratory: Negative for cough, chest tightness, shortness of breath and wheezing  Cardiovascular: Positive for palpitations  Negative for chest pain and leg swelling  Fast or slow heart rate   Gastrointestinal: Negative for abdominal pain, blood in stool, constipation, diarrhea, nausea and vomiting  Genitourinary: Negative for difficulty urinating, dysuria and frequency  Musculoskeletal: Negative for arthralgias, gait problem, joint swelling and myalgias  Neurological: Negative for dizziness, weakness, light-headedness, numbness and headaches  Psychiatric/Behavioral: Negative for agitation, confusion, dysphoric mood and sleep disturbance  The patient is not nervous/anxious  Physical Exam :  Physical Exam   Constitutional: She appears well-developed and well-nourished  No distress  HENT:   Head: Normocephalic and atraumatic  Right Ear: External ear normal    Left Ear: External ear normal    Mouth/Throat: Oropharynx is clear and moist    Eyes: Pupils are equal, round, and reactive to light  Conjunctivae and EOM are normal  Right eye exhibits no discharge  Left eye exhibits no discharge  Neck: Normal range of motion  Neck supple  No thyromegaly present  Cardiovascular: Normal heart sounds and intact distal pulses  An irregularly irregular rhythm present  Tachycardia present  No murmur heard  Pulmonary/Chest: Effort normal and breath sounds normal  No respiratory distress  She has no wheezes  She exhibits no tenderness  Abdominal: Soft  Bowel sounds are normal  She exhibits no mass  There is no tenderness  There is no rebound  Musculoskeletal: Normal range of motion  She exhibits no edema or deformity  Lymphadenopathy:     She has no cervical adenopathy  Neurological: She is alert  She has normal reflexes  No cranial nerve deficit  Skin: Skin is warm and dry  No rash noted  No erythema  Psychiatric: She has a normal mood and affect  Her behavior is normal  Judgment normal          Reviewed Updated [facility] Prior Wellness Visits:   Last Health Maintenance visit information was reviewed, patient interviewed , no change since last HM visit yes  Last HM visit information was reviewed, patient interviewed and updates made to the record today yes    Assessment and Plan:  1  Encounter for wellness examination in adult         There are no preventive care reminders to display for this patient

## 2019-01-07 NOTE — PROGRESS NOTES
Subjective:   Chief Complaint   Patient presents with    Ingrown Toenail     ingrown toenail, elevated pulse rate    Annual Exam             Patient ID: Charmel Bamberger is a 41 y o  female  Medical management-  1) patient comes in for recheck of toenail seen last month on the right foot  The small portion appears to be ingrown this was removed without anesthesia  2) patient has a known history of atrial fibrillation should began with increased heart rate last night  Currently she is on 152 and irregular  She has minimal shortness of breath  No to chest pain is noted  The following portions of the patient's history were reviewed and updated as appropriate: allergies, current medications, past family history, past medical history, past social history, past surgical history and problem list     Review of Systems   Constitutional: Negative for activity change, appetite change, diaphoresis and fatigue  Respiratory: Positive for shortness of breath  Negative for cough, chest tightness and wheezing  Cardiovascular: Positive for palpitations  Negative for chest pain and leg swelling  Fast or slow heart rate   Gastrointestinal: Negative for abdominal pain, blood in stool, constipation, diarrhea, nausea and vomiting  Genitourinary: Negative for difficulty urinating, dysuria and frequency  Neurological: Negative for dizziness, light-headedness and headaches  Psychiatric/Behavioral: Negative for agitation, confusion, dysphoric mood and sleep disturbance  The patient is not nervous/anxious  Objective:  Vitals:    01/07/19 1430   BP: 120/70   BP Location: Left arm   Patient Position: Sitting   Cuff Size: Standard   Pulse: (!) 152   Resp: 16   SpO2: 95%   Weight: 88 9 kg (196 lb)   Height: 5' 2" (1 575 m)      Physical Exam   Constitutional: She appears well-developed and well-nourished  No distress  HENT:   Head: Normocephalic and atraumatic     Right Ear: Tympanic membrane and external ear normal  No drainage  Left Ear: Tympanic membrane normal  No drainage  Mouth/Throat: No oropharyngeal exudate  Eyes: Conjunctivae and EOM are normal  Right eye exhibits no discharge  Left eye exhibits no discharge  Neck: Normal range of motion  Neck supple  No thyromegaly present  Cardiovascular: Normal heart sounds  An irregular rhythm present  Tachycardia present  Heart rate 150 per minute  Pulmonary/Chest: Effort normal  No respiratory distress  She has no wheezes  She has no rales  Abdominal: Soft  Lymphadenopathy:     She has no cervical adenopathy  Skin: Skin is warm and dry  Assessment/Plan:    Atrial fibrillation [facility])  Patient was sent to [facility] ER due to short duration of her symptoms  Her cardiologist works at a [address]   She has required cardioversion in the past        Diagnoses and all orders for this visit:    Encounter for wellness examination in adult    Paroxysmal atrial fibrillation (HCC)  -     POCT ECG    Ingrown nail of great toe of right foot    Severe obesity (BMI 35 0-39  9) with comorbidity ([address] )      EKG consistent with atrial fibrillation  Due to rapid rate will have her taken down to [facility] ER so she can see her cardiologist there  Patient will continue warm soaks on her ingrown toenails  Further discussed proper nail care  If recurrent ingrown nail symptoms persist, she will need to be referred to podiatrist for definitive now resection  BMI Counseling: Body mass index is 35 85 kg/m²  Discussed the patient's BMI with her  The BMI is above average  BMI counseling and education was provided to the patient  Nutrition recommendations include reducing portion sizes, decreasing overall calorie intake, 3-5 servings of fruits/vegetables daily, moderation in carbohydrate intake and increasing intake of lean protein

## 2019-01-07 NOTE — PATIENT INSTRUCTIONS
EKG consistent with atrial fibrillation  Due to rapid rate will have her taken down to [facility] ER so she can see her cardiologist there  Patient will continue warm soaks on her ingrown toenails  Further discussed proper nail care  If recurrent ingrown nail symptoms persist, she will need to be referred to podiatrist for definitive now resection  Obesity   AMBULATORY CARE:   Obesity  is when your body mass index (BMI) is greater than 30  Your healthcare provider will use your height and weight to measure your BMI  The risks of obesity include  many health problems, such as injuries or physical disability  You may need tests to check for the following:  · Diabetes     · High blood pressure or high cholesterol     · Heart disease     · Gallbladder or liver disease     · Cancer of the colon, breast, prostate, liver, or kidney     · Sleep apnea     · Arthritis or gout  Seek care immediately if:   · You have a severe headache, confusion, or difficulty speaking  · You have weakness on one side of your body  · You have chest pain, sweating, or shortness of breath  Contact your healthcare provider if:   · You have symptoms of gallbladder or liver disease, such as pain in your upper abdomen  · You have knee or hip pain and discomfort while walking  · You have symptoms of diabetes, such as intense hunger and thirst, and frequent urination  · You have symptoms of sleep apnea, such as snoring or daytime sleepiness  · You have questions or concerns about your condition or care  Treatment for obesity  focuses on helping you lose weight to improve your health  Even a small decrease in BMI can reduce the risk for many health problems  Your healthcare provider will help you set a weight-loss goal   · Lifestyle changes  are the first step in treating obesity  These include making healthy food choices and getting regular physical activity   Your healthcare provider may suggest a weight-loss program that involves coaching, education, and therapy  · Medicine  may help you lose weight when it is used with a healthy diet and physical activity  · Surgery  can help you lose weight if you are very obese and have other health problems  There are several types of weight-loss surgery  Ask your healthcare provider for more information  Be successful losing weight:   · Set small, realistic goals  An example of a small goal is to walk for 20 minutes 5 days a week  Anther goal is to lose 5% of your body weight  · Tell friends, family members, and coworkers about your goals  and ask for their support  Ask a friend to lose weight with you, or join a weight-loss support group  · Identify foods or triggers that may cause you to overeat , and find ways to avoid them  Remove tempting high-calorie foods from your home and workplace  Place a bowl of fresh fruit on your kitchen counter  If stress causes you to eat, then find other ways to cope with stress  · Keep a diary to track what you eat and drink  Also write down how many minutes of physical activity you do each day  Weigh yourself once a week and record it in your diary  Eating changes: You will need to eat 500 to 1,000 fewer calories each day than you currently eat to lose 1 to 2 pounds a week  The following changes will help you cut calories:  · Eat smaller portions  Use small plates, no larger than 9 inches in diameter  Fill your plate half full of fruits and vegetables  Measure your food using measuring cups until you know what a serving size looks like  · Eat 3 meals and 1 or 2 snacks each day  Plan your meals in advance  Lore Barragan and eat at home most of the time  Eat slowly  · Eat fruits and vegetables at every meal   They are low in calories and high in fiber, which makes you feel full  Do not add butter, margarine, or cream sauce to vegetables  Use herbs to season steamed vegetables  · Eat less fat and fewer fried foods    Eat more baked or grilled chicken and fish  These protein sources are lower in calories and fat than red meat  Limit fast food  Dress your salads with olive oil and vinegar instead of bottled dressing  · Limit the amount of sugar you eat  Do not drink sugary beverages  Limit alcohol  Activity changes:  Physical activity is good for your body in many ways  It helps you burn calories and build strong muscles  It decreases stress and depression, and improves your mood  It can also help you sleep better  Talk to your healthcare provider before you begin an exercise program   · Exercise for at least 30 minutes 5 days a week  Start slowly  Set aside time each day for physical activity that you enjoy and that is convenient for you  It is best to do both weight training and an activity that increases your heart rate, such as walking, bicycling, or swimming  · Find ways to be more active  Do yard work and housecleaning  Walk up the stairs instead of using elevators  Spend your leisure time going to events that require walking, such as outdoor festivals or fairs  This extra physical activity can help you lose weight and keep it off  Follow up with your healthcare provider as directed: You may need to meet with a dietitian  Write down your questions so you remember to ask them during your visits  © 2017 [address] Information is for End User's use only and may not be sold, redistributed or otherwise used for commercial purposes  All illustrations and images included in CareNotes® are the copyrighted property of [employer]  or [employer]  The above information is an  only  It is not intended as medical advice for individual conditions or treatments  Talk to your doctor, nurse or pharmacist before following any medical regimen to see if it is safe and effective for you      Weight Management   AMBULATORY CARE:   Why it is important to manage your weight:  Being overweight increases your risk of health conditions such as heart disease, high blood pressure, type 2 diabetes, and certain types of cancer  It can also increase your risk for osteoarthritis, sleep apnea, and other respiratory problems  Aim for a slow, steady weight loss  Even a small amount of weight loss can lower your risk of health problems  How to lose weight safely:  A safe and healthy way to lose weight is to eat fewer calories and get regular exercise  You can lose up about 1 pound a week by decreasing the number of calories you eat by 500 calories each day  You can decrease calories by eating smaller portion sizes or by cutting out high-calorie foods  Read labels to find out how many calories are in the foods you eat  You can also burn calories with exercise such as walking, swimming, or biking  You will be more likely to keep weight off if you make these changes part of your lifestyle  Healthy meal plan for weight management:  A healthy meal plan includes a variety of foods, contains fewer calories, and helps you stay healthy  A healthy meal plan includes the following:  · Eat whole-grain foods more often  A healthy meal plan should contain fiber  Fiber is the part of grains, fruits, and vegetables that is not broken down by your body  Whole-grain foods are healthy and provide extra fiber in your diet  Some examples of whole-grain foods are whole-wheat breads and pastas, oatmeal, brown rice, and bulgur  · Eat a variety of vegetables every day  Include dark, leafy greens such as spinach, kale, jon greens, and mustard greens  Eat yellow and orange vegetables such as carrots, sweet potatoes, and winter squash  · Eat a variety of fruits every day  Choose fresh or canned fruit (canned in its own juice or light syrup) instead of juice  Fruit juice has very little or no fiber  · Eat low-fat dairy foods  Drink fat-free (skim) milk or 1% milk  Eat fat-free yogurt and low-fat cottage cheese   Try low-fat cheeses such as mozzarella and other reduced-fat cheeses  · Choose meat and other protein foods that are low in fat  Choose beans or other legumes such as split peas or lentils  Choose fish, skinless poultry (chicken or turkey), or lean cuts of red meat (beef or pork)  Before you cook meat or poultry, cut off any visible fat  · Use less fat and oil  Try baking foods instead of frying them  Add less fat, such as margarine, sour cream, regular salad dressing and mayonnaise to foods  Eat fewer high-fat foods  Some examples of high-fat foods include french fries, doughnuts, ice cream, and cakes  · Eat fewer sweets  Limit foods and drinks that are high in sugar  This includes candy, cookies, regular soda, and sweetened drinks  Ways to decrease calories:   · Eat smaller portions  ¨ Use a small plate with smaller servings  ¨ Do not eat second helpings  ¨ When you eat at a restaurant, ask for a box and place half of your meal in the box before you eat  ¨ Share an entrée with someone else  · Replace high-calorie snacks with healthy, low-calorie snacks  ¨ Choose fresh fruit, vegetables, fat-free rice cakes, or air-popped popcorn instead of potato chips, nuts, or chocolate  ¨ Choose water or calorie-free drinks instead of soda or sweetened drinks  · Eat regular meals  Skipping meals can lead to overeating later in the day  Eat a healthy snack in place of a meal if you do not have time to eat a regular meal      · Do not shop for groceries when you are hungry  You may be more likely to make unhealthy food choices  Take a grocery list of healthy foods and shop after you have eaten  Exercise:  Exercise at least 30 minutes per day on most days of the week  Some examples of exercise include walking, biking, dancing, and swimming  You can also fit in more physical activity by taking the stairs instead of the elevator or parking farther away from stores   Ask your healthcare provider about the best exercise plan for you  Other things to consider as you try to lose weight:   · Be aware of situations that may give you the urge to overeat, such as eating while watching television  Find ways to avoid these situations  For example, read a book, go for a walk, or do crafts  · Meet with a weight loss support group or friends who are also trying to lose weight  This may help you stay motivated to continue working on your weight loss goals  © 2017 [address] Information is for End User's use only and may not be sold, redistributed or otherwise used for commercial purposes  All illustrations and images included in CareNotes® are the copyrighted property of A [employer] M [employer]  or [employer]  The above information is an  only  It is not intended as medical advice for individual conditions or treatments  Talk to your doctor, nurse or pharmacist before following any medical regimen to see if it is safe and effective for you

## 2019-01-08 NOTE — ASSESSMENT & PLAN NOTE
Patient was sent to [facility] due to short duration of her symptoms    Her cardiologist works at a [facility]   She has required cardioversion in the past

## 2019-01-23 ENCOUNTER — TELEPHONE (OUTPATIENT)
Dept: OBGYN CLINIC | Facility: CLINIC | Age: 39
End: 2019-01-23

## 2019-01-24 ENCOUNTER — OFFICE VISIT (OUTPATIENT)
Dept: OBGYN CLINIC | Facility: CLINIC | Age: 39
End: 2019-01-24
Payer: COMMERCIAL

## 2019-01-24 VITALS
DIASTOLIC BLOOD PRESSURE: 62 MMHG | BODY MASS INDEX: 36.62 KG/M2 | HEIGHT: 62 IN | SYSTOLIC BLOOD PRESSURE: 124 MMHG | WEIGHT: 199 LBS

## 2019-01-24 DIAGNOSIS — N92.6 MISSED PERIODS: Primary | ICD-10-CM

## 2019-01-24 DIAGNOSIS — R79.89 ELEVATED PROLACTIN LEVEL: ICD-10-CM

## 2019-01-24 DIAGNOSIS — R73.9 ELEVATED BLOOD SUGAR: ICD-10-CM

## 2019-01-24 LAB — SL AMB POCT URINE HCG: NEGATIVE

## 2019-01-24 PROCEDURE — 99214 OFFICE O/P EST MOD 30 MIN: CPT | Performed by: OBSTETRICS & GYNECOLOGY

## 2019-01-24 PROCEDURE — 81025 URINE PREGNANCY TEST: CPT | Performed by: OBSTETRICS & GYNECOLOGY

## 2019-01-24 RX ORDER — MEDROXYPROGESTERONE ACETATE 10 MG/1
10 TABLET ORAL DAILY
Qty: 10 TABLET | Refills: 0 | Status: SHIPPED | OUTPATIENT
Start: 2019-01-24 | End: 2019-02-25 | Stop reason: ALTCHOICE

## 2019-01-24 RX ORDER — FLECAINIDE ACETATE 100 MG/1
TABLET ORAL
COMMUNITY
Start: 2019-01-10

## 2019-01-24 NOTE — LETTER
January 24, 2019     Chrystal Frankel, MD  [address]  [id number]    Patient: Nelson Booth   YOB: 1980   Date of Visit: 1/24/2019       Dear Dr Clark Forget:    Thank you for referring Nelson Booth to me for evaluation  Below are my notes for this consultation  If you have questions, please do not hesitate to call me  I look forward to following your patient along with you  Sincerely,        Addis Laureano MD        CC: No Recipients  Addis Laureano MD  1/24/2019 11:47 AM  Sign at close encounter  Bradly Alexander is here today to review test results is also to see what other steps can be taken  The patient who was found to have a blood sugar of 157 with a CMP be ordered in December was recently in the hospital for irregular heart rhythm  At that point she had a CMP with a blood sugar of 146  I recommended the Bradly Alexander that I contact her family physician to see if any further steps at this time would be taken  In the meantime Bradly Alexander will have a hemoglobin A1c run along with a TSH which unfortunately was missed with her initial battery of tests  Her urine HCG today is negative and I advised Bradly Alexander us to start Provera 10 mg for the next 7 days to see if we could initiate cycling through this  The patient does have a BMI of 36 4 and is very likely to be polycystic ovarian disease  The relationship of diabetes and BMI to the functionality of polycystic ovarian disease were reviewed  Along those lines emphasis was placed on weight loss and we will see whether or not she would be a candidate for metformin treatment  Lastly, we reviewed the fact that she does have a slightly elevated prolactin level but currently has no symptoms as she denies any headaches or visual changes or breast discharge  With this in mind I will simply repeat a prolactin level in 4 months and inform Bradly Alexander when it is time to have this performed    Her mother was present also had the chance to ask and have her questions answered to her satisfaction  Annual will be notified of the test results when available  A informed Bradly Alexander and her mom to please contact our office if there was no evidence of spotting or bleeding 10 days after finishing the last Provera tablet  I have spent 27 minutes with Patient and family today in which greater than 50% of this time was spent in counseling/coordination of care regarding Diagnostic results, Prognosis, Risks and benefits of tx options, Intructions for management and Risk factor reductions

## 2019-01-24 NOTE — PROGRESS NOTES
Horace Saunders is here today to review test results is also to see what other steps can be taken  The patient who was found to have a blood sugar of 157 with a CMP be ordered in December was recently in the hospital for irregular heart rhythm  At that point she had a CMP with a blood sugar of 146  I recommended the Horace Saunders that I contact her family physician to see if any further steps at this time would be taken  In the meantime Horace Saunders will have a hemoglobin A1c run along with a TSH which unfortunately was missed with her initial battery of tests  Her urine HCG today is negative and I advised Horace Saunders us to start Provera 10 mg for the next 7 days to see if we could initiate cycling through this  The patient does have a BMI of 36 4 and is very likely to be polycystic ovarian disease  The relationship of diabetes and BMI to the functionality of polycystic ovarian disease were reviewed  Along those lines emphasis was placed on weight loss and we will see whether or not she would be a candidate for metformin treatment  Lastly, we reviewed the fact that she does have a slightly elevated prolactin level but currently has no symptoms as she denies any headaches or visual changes or breast discharge  With this in mind I will simply repeat a prolactin level in 4 months and inform Horace Saunders when it is time to have this performed  Her mother was present also had the chance to ask and have her questions answered to her satisfaction  Annual will be notified of the test results when available  A informed Horace Saunders and her mom to please contact our office if there was no evidence of spotting or bleeding 10 days after finishing the last Provera tablet  I have spent 27 minutes with Patient and family today in which greater than 50% of this time was spent in counseling/coordination of care regarding Diagnostic results, Prognosis, Risks and benefits of tx options, Intructions for management and Risk factor reductions

## 2019-02-06 LAB
EST. AVERAGE GLUCOSE BLD GHB EST-MCNC: 157 MG/DL
HBA1C MFR BLD: 7.1 % (ref 4.8–5.6)
TSH SERPL DL<=0.005 MIU/L-ACNC: 1.63 UIU/ML (ref 0.45–4.5)

## 2019-02-13 ENCOUNTER — TELEPHONE (OUTPATIENT)
Dept: OBGYN CLINIC | Facility: CLINIC | Age: 39
End: 2019-02-13

## 2019-02-13 NOTE — TELEPHONE ENCOUNTER
----- Message from Kaitlyn Warren MD sent at 2/11/2019 11:27 AM EST -----  Please inform So Boss her hemoglobin A1c is greatly elevated and she needs to confer with her family physician about diabetes control

## 2019-02-14 ENCOUNTER — TELEPHONE (OUTPATIENT)
Dept: OBGYN CLINIC | Facility: CLINIC | Age: 39
End: 2019-02-14

## 2019-02-14 NOTE — TELEPHONE ENCOUNTER
----- Message from Simran Hamilton MD sent at 2/11/2019 11:27 AM EST -----  Please inform Arvind Yarbrough her hemoglobin A1c is greatly elevated and she needs to confer with her family physician about diabetes control

## 2019-02-14 NOTE — TELEPHONE ENCOUNTER
----- Message from Kala Vasquez MD sent at 2/11/2019 11:27 AM EST -----  Please inform Wendy Kawasaki her hemoglobin A1c is greatly elevated and she needs to confer with her family physician about diabetes control

## 2019-02-25 ENCOUNTER — OFFICE VISIT (OUTPATIENT)
Dept: FAMILY MEDICINE CLINIC | Facility: CLINIC | Age: 39
End: 2019-02-25
Payer: COMMERCIAL

## 2019-02-25 VITALS
RESPIRATION RATE: 16 BRPM | HEART RATE: 94 BPM | SYSTOLIC BLOOD PRESSURE: 110 MMHG | TEMPERATURE: 98 F | HEIGHT: 62 IN | DIASTOLIC BLOOD PRESSURE: 70 MMHG | WEIGHT: 195 LBS | OXYGEN SATURATION: 94 % | BODY MASS INDEX: 35.88 KG/M2

## 2019-02-25 DIAGNOSIS — J06.9 VIRAL UPPER RESPIRATORY TRACT INFECTION: Primary | ICD-10-CM

## 2019-02-25 PROCEDURE — 99213 OFFICE O/P EST LOW 20 MIN: CPT | Performed by: FAMILY MEDICINE

## 2019-02-25 PROCEDURE — 1036F TOBACCO NON-USER: CPT | Performed by: FAMILY MEDICINE

## 2019-02-25 PROCEDURE — 3008F BODY MASS INDEX DOCD: CPT | Performed by: FAMILY MEDICINE

## 2019-02-25 RX ORDER — CHLORHEXIDINE GLUCONATE 0.12 MG/ML
RINSE ORAL
COMMUNITY
Start: 2019-02-11 | End: 2020-06-01 | Stop reason: ALTCHOICE

## 2019-02-25 NOTE — LETTER
February 25, 2019     Patient: Parish Mendes   YOB: 1980   Date of Visit: 2/25/2019       To Whom it May Concern:    Parish Mendes is under my professional care  She was seen in my office on 2/25/2019  She may return to work on 2/27/2019   Excuse work absence due to illness       If you have any questions or concerns, please don't hesitate to call           Sincerely,          Kristel Tolbert MD        CC: No Recipients

## 2019-02-25 NOTE — PROGRESS NOTES
[address] [facility]        NAME: Melissa Pearce is a 41 y o  female  : 1980    MRN: [id number]  DATE: 2019  TIME: 5:25 PM    Assessment and Plan   Viral upper respiratory tract infection [J06 9]  1  Viral upper respiratory tract infection         No problem-specific Assessment & Plan notes found for this encounter  Patient Instructions     Patient Instructions   This appears to be a viral respiratory infection  Symptomatic treatment over-the-counter medications as discussed  I do not think you need an antibiotic today  He did not meet the clinical criteria for strep  Chief Complaint     Chief Complaint   Patient presents with    Cold Like Symptoms     cough, congestion, sore throat         History of Present Illness       Patient been no last 2 days with severe sore throat  No fevers  No recognized lymph nodes  Mother is also ill  Review of Systems   Review of Systems   Constitutional: Negative for appetite change, chills, diaphoresis and fever  HENT: Positive for congestion  Negative for ear pain, rhinorrhea and sinus pressure  Eyes: Negative for discharge, redness and itching  Respiratory: Positive for cough  Negative for shortness of breath and wheezing  Cardiovascular: Negative for chest pain and palpitations  Rapid or slow heart rate   Gastrointestinal: Negative for abdominal pain, diarrhea, nausea and vomiting           Current Medications       Current Outpatient Medications:     aspirin 81 MG tablet, Take 81 mg by mouth daily, Disp: , Rfl:     chlorhexidine (PERIDEX) 0 12 % solution, , Disp: , Rfl:     flecainide (TAMBOCOR) 100 mg tablet, , Disp: , Rfl:     metoprolol succinate (TOPROL-XL) 25 mg 24 hr tablet, Take 25 mg by mouth daily, Disp: , Rfl:     Current Allergies     Allergies as of 2019    (No Known Allergies)            The following portions of the patient's history were reviewed and updated as appropriate: allergies, current medications, past family history, past medical history, past social history, past surgical history and problem list      Past Medical History:   Diagnosis Date    Adnexal cyst     left    History of atrial fibrillation     ablation done, takes metoprolol    Irregular periods        Past Surgical History:   Procedure Laterality Date    CARDIAC ELECTROPHYSIOLOGY STUDY AND ABLATION      CATARACT EXTRACTION      OTHER SURGICAL HISTORY      catheter ablation atrial fibrillation    OVARIAN CYST SURGERY Right 2/2/2018    Procedure: LAPAROSCOPIC PARAOVARIAN  CYSTECTOMY;  Surgeon: Mildred Syed MD;  Location:  MAIN OR;  Service: Gynecology       Family History   Problem Relation Age of Onset    Heart disease Father     Hypertension Maternal Grandmother     Heart disease Family     Cancer Family     Thyroid disease Family     Substance Abuse Neg Hx     Mental illness Neg Hx          Medications have been verified  Objective   /70 (BP Location: Right arm, Patient Position: Sitting, Cuff Size: Large)   Pulse 94   Temp 98 °F (36 7 °C) (Oral)   Resp 16   Ht 5' 2" (1 575 m)   Wt 88 5 kg (195 lb)   SpO2 94%   BMI 35 67 kg/m²        Physical Exam     Physical Exam   Constitutional: She appears well-developed and well-nourished  No distress  HENT:   Head: Normocephalic and atraumatic  Right Ear: Tympanic membrane and external ear normal  No drainage  Left Ear: Tympanic membrane normal  No drainage  Nose: Rhinorrhea present  Right sinus exhibits no maxillary sinus tenderness  Left sinus exhibits no maxillary sinus tenderness  Mouth/Throat: No oropharyngeal exudate  Eyes: Conjunctivae and EOM are normal  Right eye exhibits no discharge  Left eye exhibits no discharge  Neck: Normal range of motion  Neck supple  No thyromegaly present  Cardiovascular: Normal rate, regular rhythm and normal heart sounds     Pulmonary/Chest: Effort normal  No respiratory distress  She has no wheezes  She has no rales  Abdominal: Soft  Lymphadenopathy:     She has no cervical adenopathy  Skin: Skin is warm and dry

## 2019-02-25 NOTE — PATIENT INSTRUCTIONS
This appears to be a viral respiratory infection  Symptomatic treatment over-the-counter medications as discussed  I do not think you need an antibiotic today  He did not meet the clinical criteria for strep

## 2019-02-26 ENCOUNTER — TELEPHONE (OUTPATIENT)
Dept: FAMILY MEDICINE CLINIC | Facility: CLINIC | Age: 39
End: 2019-02-26

## 2019-02-26 DIAGNOSIS — J06.9 VIRAL UPPER RESPIRATORY TRACT INFECTION: Primary | ICD-10-CM

## 2019-02-26 RX ORDER — BENZONATATE 200 MG/1
200 CAPSULE ORAL 3 TIMES DAILY PRN
Qty: 20 CAPSULE | Refills: 0 | Status: SHIPPED | OUTPATIENT
Start: 2019-02-26 | End: 2019-10-21

## 2019-02-27 ENCOUNTER — TELEPHONE (OUTPATIENT)
Dept: FAMILY MEDICINE CLINIC | Facility: CLINIC | Age: 39
End: 2019-02-27

## 2019-02-27 NOTE — LETTER
February 27, 2019     Patient: Maritza Delgadillo   YOB: 1980   Date of Visit: 2/27/2019       To Whom it May Concern:    Maritza Delgadillo was out of work due to illness on 2/25/19 until 2/27/19  She may return to work on 02/28/2019  If you have any questions or concerns, please don't hesitate to call           Sincerely,          Mari Vaughn

## 2019-04-12 ENCOUNTER — OFFICE VISIT (OUTPATIENT)
Dept: OBGYN CLINIC | Facility: CLINIC | Age: 39
End: 2019-04-12
Payer: COMMERCIAL

## 2019-04-12 VITALS
WEIGHT: 193 LBS | HEIGHT: 62 IN | SYSTOLIC BLOOD PRESSURE: 118 MMHG | BODY MASS INDEX: 35.51 KG/M2 | DIASTOLIC BLOOD PRESSURE: 76 MMHG

## 2019-04-12 DIAGNOSIS — N91.2 AMENORRHEA: Primary | ICD-10-CM

## 2019-04-12 DIAGNOSIS — R79.89 ELEVATED PROLACTIN LEVEL: ICD-10-CM

## 2019-04-12 DIAGNOSIS — R73.09 ELEVATED HEMOGLOBIN A1C: ICD-10-CM

## 2019-04-12 PROCEDURE — 99214 OFFICE O/P EST MOD 30 MIN: CPT | Performed by: OBSTETRICS & GYNECOLOGY

## 2019-04-12 RX ORDER — MEDROXYPROGESTERONE ACETATE 10 MG/1
10 TABLET ORAL DAILY
Qty: 7 TABLET | Refills: 0 | Status: SHIPPED | OUTPATIENT
Start: 2019-04-12 | End: 2019-10-21

## 2019-05-02 ENCOUNTER — TRANSCRIBE ORDERS (OUTPATIENT)
Dept: PHYSICAL THERAPY | Facility: CLINIC | Age: 39
End: 2019-05-02

## 2019-05-02 ENCOUNTER — EVALUATION (OUTPATIENT)
Dept: PHYSICAL THERAPY | Facility: CLINIC | Age: 39
End: 2019-05-02
Payer: COMMERCIAL

## 2019-05-02 DIAGNOSIS — M76.61 TENDONITIS, ACHILLES, RIGHT: Primary | ICD-10-CM

## 2019-05-02 DIAGNOSIS — M76.62 TENDONITIS, ACHILLES, LEFT: ICD-10-CM

## 2019-05-02 PROCEDURE — 97110 THERAPEUTIC EXERCISES: CPT | Performed by: PHYSICAL THERAPIST

## 2019-05-02 PROCEDURE — 97162 PT EVAL MOD COMPLEX 30 MIN: CPT | Performed by: PHYSICAL THERAPIST

## 2019-05-07 ENCOUNTER — OFFICE VISIT (OUTPATIENT)
Dept: PHYSICAL THERAPY | Facility: CLINIC | Age: 39
End: 2019-05-07
Payer: COMMERCIAL

## 2019-05-07 DIAGNOSIS — M76.61 TENDONITIS, ACHILLES, RIGHT: Primary | ICD-10-CM

## 2019-05-07 DIAGNOSIS — M76.62 TENDONITIS, ACHILLES, LEFT: ICD-10-CM

## 2019-05-07 PROCEDURE — 97112 NEUROMUSCULAR REEDUCATION: CPT | Performed by: PHYSICAL THERAPIST

## 2019-05-07 PROCEDURE — 97140 MANUAL THERAPY 1/> REGIONS: CPT | Performed by: PHYSICAL THERAPIST

## 2019-05-09 ENCOUNTER — OFFICE VISIT (OUTPATIENT)
Dept: PHYSICAL THERAPY | Facility: CLINIC | Age: 39
End: 2019-05-09
Payer: COMMERCIAL

## 2019-05-09 DIAGNOSIS — M76.61 TENDONITIS, ACHILLES, RIGHT: Primary | ICD-10-CM

## 2019-05-09 PROCEDURE — 97112 NEUROMUSCULAR REEDUCATION: CPT | Performed by: PHYSICAL THERAPIST

## 2019-05-09 PROCEDURE — 97140 MANUAL THERAPY 1/> REGIONS: CPT | Performed by: PHYSICAL THERAPIST

## 2019-05-13 ENCOUNTER — APPOINTMENT (OUTPATIENT)
Dept: PHYSICAL THERAPY | Facility: CLINIC | Age: 39
End: 2019-05-13
Payer: COMMERCIAL

## 2019-05-17 ENCOUNTER — APPOINTMENT (OUTPATIENT)
Dept: PHYSICAL THERAPY | Facility: CLINIC | Age: 39
End: 2019-05-17
Payer: COMMERCIAL

## 2019-05-21 ENCOUNTER — OFFICE VISIT (OUTPATIENT)
Dept: PHYSICAL THERAPY | Facility: CLINIC | Age: 39
End: 2019-05-21
Payer: COMMERCIAL

## 2019-05-21 DIAGNOSIS — M76.61 TENDONITIS, ACHILLES, RIGHT: Primary | ICD-10-CM

## 2019-05-21 DIAGNOSIS — M76.62 TENDONITIS, ACHILLES, LEFT: ICD-10-CM

## 2019-05-21 PROCEDURE — 97110 THERAPEUTIC EXERCISES: CPT | Performed by: PHYSICAL THERAPIST

## 2019-05-21 PROCEDURE — 97140 MANUAL THERAPY 1/> REGIONS: CPT | Performed by: PHYSICAL THERAPIST

## 2019-05-21 PROCEDURE — 97112 NEUROMUSCULAR REEDUCATION: CPT | Performed by: PHYSICAL THERAPIST

## 2019-05-23 ENCOUNTER — OFFICE VISIT (OUTPATIENT)
Dept: PHYSICAL THERAPY | Facility: CLINIC | Age: 39
End: 2019-05-23
Payer: COMMERCIAL

## 2019-05-23 DIAGNOSIS — M76.62 TENDONITIS, ACHILLES, LEFT: ICD-10-CM

## 2019-05-23 DIAGNOSIS — M76.61 TENDONITIS, ACHILLES, RIGHT: Primary | ICD-10-CM

## 2019-05-23 PROCEDURE — 97112 NEUROMUSCULAR REEDUCATION: CPT | Performed by: PHYSICAL THERAPIST

## 2019-05-23 PROCEDURE — 97140 MANUAL THERAPY 1/> REGIONS: CPT | Performed by: PHYSICAL THERAPIST

## 2019-05-23 PROCEDURE — 97110 THERAPEUTIC EXERCISES: CPT | Performed by: PHYSICAL THERAPIST

## 2019-05-31 ENCOUNTER — APPOINTMENT (OUTPATIENT)
Dept: PHYSICAL THERAPY | Facility: CLINIC | Age: 39
End: 2019-05-31
Payer: COMMERCIAL

## 2019-06-04 ENCOUNTER — EVALUATION (OUTPATIENT)
Dept: PHYSICAL THERAPY | Facility: CLINIC | Age: 39
End: 2019-06-04
Payer: COMMERCIAL

## 2019-06-04 DIAGNOSIS — M76.61 TENDONITIS, ACHILLES, RIGHT: Primary | ICD-10-CM

## 2019-06-04 DIAGNOSIS — M76.62 TENDONITIS, ACHILLES, LEFT: ICD-10-CM

## 2019-06-04 PROCEDURE — 97112 NEUROMUSCULAR REEDUCATION: CPT | Performed by: PHYSICAL THERAPIST

## 2019-06-04 PROCEDURE — 97140 MANUAL THERAPY 1/> REGIONS: CPT | Performed by: PHYSICAL THERAPIST

## 2019-06-06 ENCOUNTER — APPOINTMENT (OUTPATIENT)
Dept: PHYSICAL THERAPY | Facility: CLINIC | Age: 39
End: 2019-06-06
Payer: COMMERCIAL

## 2019-06-13 ENCOUNTER — APPOINTMENT (OUTPATIENT)
Dept: PHYSICAL THERAPY | Facility: CLINIC | Age: 39
End: 2019-06-13
Payer: COMMERCIAL

## 2019-06-18 ENCOUNTER — OFFICE VISIT (OUTPATIENT)
Dept: PHYSICAL THERAPY | Facility: CLINIC | Age: 39
End: 2019-06-18
Payer: COMMERCIAL

## 2019-06-18 ENCOUNTER — APPOINTMENT (OUTPATIENT)
Dept: PHYSICAL THERAPY | Facility: CLINIC | Age: 39
End: 2019-06-18
Payer: COMMERCIAL

## 2019-06-18 DIAGNOSIS — M76.61 TENDONITIS, ACHILLES, RIGHT: Primary | ICD-10-CM

## 2019-06-18 DIAGNOSIS — M76.62 TENDONITIS, ACHILLES, LEFT: ICD-10-CM

## 2019-06-18 PROCEDURE — 97112 NEUROMUSCULAR REEDUCATION: CPT | Performed by: PHYSICAL THERAPIST

## 2019-06-20 ENCOUNTER — OFFICE VISIT (OUTPATIENT)
Dept: PHYSICAL THERAPY | Facility: CLINIC | Age: 39
End: 2019-06-20
Payer: COMMERCIAL

## 2019-06-20 ENCOUNTER — APPOINTMENT (OUTPATIENT)
Dept: PHYSICAL THERAPY | Facility: CLINIC | Age: 39
End: 2019-06-20
Payer: COMMERCIAL

## 2019-06-20 DIAGNOSIS — M76.62 TENDONITIS, ACHILLES, LEFT: ICD-10-CM

## 2019-06-20 DIAGNOSIS — M76.61 TENDONITIS, ACHILLES, RIGHT: Primary | ICD-10-CM

## 2019-06-20 PROCEDURE — 97110 THERAPEUTIC EXERCISES: CPT | Performed by: PHYSICAL THERAPIST

## 2019-06-20 PROCEDURE — 97112 NEUROMUSCULAR REEDUCATION: CPT | Performed by: PHYSICAL THERAPIST

## 2019-06-25 ENCOUNTER — APPOINTMENT (OUTPATIENT)
Dept: PHYSICAL THERAPY | Facility: CLINIC | Age: 39
End: 2019-06-25
Payer: COMMERCIAL

## 2019-06-27 ENCOUNTER — APPOINTMENT (OUTPATIENT)
Dept: PHYSICAL THERAPY | Facility: CLINIC | Age: 39
End: 2019-06-27
Payer: COMMERCIAL

## 2019-10-21 ENCOUNTER — OFFICE VISIT (OUTPATIENT)
Dept: FAMILY MEDICINE CLINIC | Facility: CLINIC | Age: 39
End: 2019-10-21
Payer: COMMERCIAL

## 2019-10-21 VITALS
HEART RATE: 101 BPM | DIASTOLIC BLOOD PRESSURE: 76 MMHG | OXYGEN SATURATION: 98 % | SYSTOLIC BLOOD PRESSURE: 122 MMHG | TEMPERATURE: 97.5 F | BODY MASS INDEX: 35.33 KG/M2 | HEIGHT: 62 IN | WEIGHT: 192 LBS

## 2019-10-21 DIAGNOSIS — Z01.818 PRE-OP EXAMINATION: Primary | ICD-10-CM

## 2019-10-21 DIAGNOSIS — I48.0 PAROXYSMAL ATRIAL FIBRILLATION (HCC): ICD-10-CM

## 2019-10-21 DIAGNOSIS — M92.62 HAGLUND'S DEFORMITY OF LEFT HEEL: ICD-10-CM

## 2019-10-21 PROCEDURE — 99214 OFFICE O/P EST MOD 30 MIN: CPT | Performed by: FAMILY MEDICINE

## 2019-10-21 PROCEDURE — 3008F BODY MASS INDEX DOCD: CPT | Performed by: FAMILY MEDICINE

## 2019-10-21 RX ORDER — MELOXICAM 7.5 MG/1
TABLET ORAL
COMMUNITY
End: 2020-06-01 | Stop reason: ALTCHOICE

## 2019-10-21 NOTE — PROGRESS NOTES
[id number]        NAME: Vick Slater is a 46 y o  female  : 1980    MRN: [id number]  DATE: 2019  TIME: 3:31 PM    Assessment and Plan   Pre-op examination [Z01 818]  1  Pre-op examination     2  Verónica's deformity of left heel     3  Paroxysmal atrial fibrillation (HCC)         No problem-specific Assessment & Plan notes found for this encounter  Patient Instructions     There are no Patient Instructions on file for this visit  Chief Complaint     Chief Complaint   Patient presents with    Pre-op Exam     heel spur surgery for left foot          History of Present Illness       Patient here for evaluation prior to surgery on left heel spurs  Being done by podiatrist at at [facility]  Only medical issue is atrial fibrillation  This is controlled with medication  Only aspirin is a blood thinner  Patient did see Cardiology last month  He generally has cleared her for proposed surgery  She is older aspirin 3 days prior to the procedure  No pre-admission testing is available at this exam   No orders seen in the patient packet  Review of Systems   Review of Systems   Constitutional: Negative for activity change, appetite change, diaphoresis and fatigue  Respiratory: Negative for cough, chest tightness, shortness of breath and wheezing  Cardiovascular: Negative for chest pain, palpitations and leg swelling  Fast or slow heart rate   Gastrointestinal: Negative for abdominal pain, blood in stool, constipation, diarrhea, nausea and vomiting  Genitourinary: Negative for difficulty urinating, dysuria and frequency  Musculoskeletal: Negative for arthralgias, gait problem, joint swelling and myalgias  Neurological: Negative for dizziness, light-headedness and headaches  Psychiatric/Behavioral: Negative for agitation, confusion, dysphoric mood and sleep disturbance  The patient is not nervous/anxious  Current Medications       Current Outpatient Medications:     aspirin 81 MG tablet, Take 81 mg by mouth daily, Disp: , Rfl:     flecainide (TAMBOCOR) 100 mg tablet, , Disp: , Rfl:     metoprolol succinate (TOPROL-XL) 25 mg 24 hr tablet, Take 25 mg by mouth daily, Disp: , Rfl:     chlorhexidine (PERIDEX) 0 12 % solution, , Disp: , Rfl:     diclofenac sodium (VOLTAREN) 1 %, diclofenac 1 % topical gel  APPLY 2 GRAM TO THE AFFECTED AREA(S) BY TOPICAL ROUTE 4 TIMES PER DAY as needed for pain, Disp: , Rfl:     meloxicam (MOBIC) 7 5 mg tablet, meloxicam 7 5 mg tablet, Disp: , Rfl:     Current Allergies     Allergies as of 10/21/2019    (No Known Allergies)            The following portions of the patient's history were reviewed and updated as appropriate: allergies, current medications, past family history, past medical history, past social history, past surgical history and problem list      Past Medical History:   Diagnosis Date    Adnexal cyst     left    History of atrial fibrillation     ablation done, takes metoprolol    Irregular periods        Past Surgical History:   Procedure Laterality Date    CARDIAC ELECTROPHYSIOLOGY STUDY AND ABLATION      CATARACT EXTRACTION      OTHER SURGICAL HISTORY      catheter ablation atrial fibrillation    OVARIAN CYST SURGERY Right 2/2/2018    Procedure: LAPAROSCOPIC PARAOVARIAN  CYSTECTOMY;  Surgeon: Alexandra Morton MD;  Location: [facility] OR;  Service: Gynecology       Family History   Problem Relation Age of Onset    Heart disease Father     Hypertension Maternal Grandmother     Heart disease Family     Cancer Family     Thyroid disease Family     Substance Abuse Neg Hx     Mental illness Neg Hx          Medications have been verified          Objective   /76   Pulse 101   Temp 97 5 °F (36 4 °C)   Ht 5' 2" (1 575 m)   Wt 87 1 kg (192 lb)   SpO2 98%   BMI 35 12 kg/m²        Physical Exam     Physical Exam   Constitutional: She is oriented to person, place, and time  She appears well-developed and well-nourished  No distress  HENT:   Head: Normocephalic and atraumatic  Right Ear: Tympanic membrane, external ear and ear canal normal    Left Ear: Tympanic membrane, external ear and ear canal normal    Nose: No mucosal edema or rhinorrhea  Right sinus exhibits no maxillary sinus tenderness  Left sinus exhibits no maxillary sinus tenderness  Mouth/Throat: Uvula is midline  Mucous membranes are not pale and not dry  Normal dentition  No oropharyngeal exudate  Eyes: Pupils are equal, round, and reactive to light  EOM are normal  Right eye exhibits no discharge  Left eye exhibits no discharge  Neck: Normal range of motion  Neck supple  No thyromegaly present  Cardiovascular: Normal rate, regular rhythm, normal heart sounds and intact distal pulses  No murmur heard  Pulmonary/Chest: Effort normal and breath sounds normal  No respiratory distress  She has no wheezes  She has no rales  Abdominal: Soft  Bowel sounds are normal  She exhibits no distension and no mass  There is no tenderness  Musculoskeletal: Normal range of motion  She exhibits no edema or tenderness  Lymphadenopathy:     She has no cervical adenopathy  Neurological: She is alert and oriented to person, place, and time  No cranial nerve deficit  Skin: She is not diaphoretic  Psychiatric: She has a normal mood and affect   Her behavior is normal

## 2019-10-21 NOTE — PATIENT INSTRUCTIONS
Forms will be completed and faxed to pre-admission area of the hospital   You should contact the surgeon's office as to availability of any alternate procedure  Also clarify if they need a new EKG and or blood test prior to your surgery  If they send those to me I will gladly review  Cardiology to since effort medical clearance

## 2019-11-11 ENCOUNTER — ANNUAL EXAM (OUTPATIENT)
Dept: OBGYN CLINIC | Facility: CLINIC | Age: 39
End: 2019-11-11
Payer: COMMERCIAL

## 2019-11-11 VITALS
SYSTOLIC BLOOD PRESSURE: 114 MMHG | BODY MASS INDEX: 34.23 KG/M2 | DIASTOLIC BLOOD PRESSURE: 70 MMHG | HEIGHT: 62 IN | WEIGHT: 186 LBS

## 2019-11-11 DIAGNOSIS — N91.5 OLIGOMENORRHEA, UNSPECIFIED TYPE: ICD-10-CM

## 2019-11-11 DIAGNOSIS — R73.09 ELEVATED HEMOGLOBIN A1C: ICD-10-CM

## 2019-11-11 DIAGNOSIS — Z01.419 ENCOUNTER FOR GYNECOLOGICAL EXAMINATION WITHOUT ABNORMAL FINDING: Primary | ICD-10-CM

## 2019-11-11 PROCEDURE — 99395 PREV VISIT EST AGE 18-39: CPT | Performed by: OBSTETRICS & GYNECOLOGY

## 2019-11-11 NOTE — PROGRESS NOTES
CC:  Annual exam    HPI: Funmi Degroot presents for routine gyn exam   Alicia Art continues to complain of very infrequent periods  However, blood work that was requested back in April still has not yet to be performed  I discussed with Alicia Art fact that she needs to be more consistent in following through with recommendations and requests so that I can help her  In addition she did not contact her family physician with respect to the elevated hemoglobin A1c level  We briefly discussed polycystic ovarian syndrome and the fact that Alicia Art very likely has this  She does also have an elevated prolactin level and needs to have this followed as well  Past Medical History:  Past Medical History:   Diagnosis Date    Adnexal cyst     left    History of atrial fibrillation     ablation done, takes metoprolol    Irregular periods     Varicella        Past Surgical History:  Past Surgical History:   Procedure Laterality Date    CARDIAC ELECTROPHYSIOLOGY STUDY AND ABLATION      CATARACT EXTRACTION      OTHER SURGICAL HISTORY      catheter ablation atrial fibrillation    OVARIAN CYST SURGERY Right 2/2/2018    Procedure: LAPAROSCOPIC PARAOVARIAN  CYSTECTOMY;  Surgeon: Fadi Briggs MD;  Location: [facility] OR;  Service: Gynecology       Past OB/Gyn History:  Menstrual cycle is very infrequent  She had 1 in February following a Provera withdrawal and a spontaneous cycle in August   Denies any history of sexually transmitted infection  No history of abnormal pap smears   Her last pap smear was 2017 and normal     ALLERGIES: No Known Allergies    MEDS:   Current Outpatient Medications:     aspirin 81 MG tablet    flecainide (TAMBOCOR) 100 mg tablet    metoprolol succinate (TOPROL-XL) 25 mg 24 hr tablet    chlorhexidine (PERIDEX) 0 12 % solution    diclofenac sodium (VOLTAREN) 1 %    meloxicam (MOBIC) 7 5 mg tablet    Family History:  Family History   Problem Relation Age of Onset    Heart disease Father    Lucy Bradley Hypertension Maternal Grandmother     Heart disease Family     Cancer Family     Thyroid disease Family     Substance Abuse Neg Hx     Mental illness Neg Hx        Social History:  Social History     Socioeconomic History    Marital status: /Civil Union     Spouse name: Not on file    Number of children: Not on file    Years of education: Not on file    Highest education level: Not on file   Occupational History    Not on file   Social Needs    Financial resource strain: Not on file    Food insecurity:     Worry: Not on file     Inability: Not on file    Transportation needs:     Medical: Not on file     Non-medical: Not on file   Tobacco Use    Smoking status: Former Smoker     Packs/day: 1 50     Years: 14 00     Pack years: 21 00     Last attempt to quit: 2012     Years since quittin 8    Smokeless tobacco: Never Used   Substance and Sexual Activity    Alcohol use: Yes     Comment: rare    Drug use: No    Sexual activity: Yes     Partners: Male     Birth control/protection: None   Lifestyle    Physical activity:     Days per week: Not on file     Minutes per session: Not on file    Stress: Not on file   Relationships    Social connections:     Talks on phone: Not on file     Gets together: Not on file     Attends Synagogue service: Not on file     Active member of club or organization: Not on file     Attends meetings of clubs or organizations: Not on file     Relationship status: Not on file    Intimate partner violence:     Fear of current or ex partner: Not on file     Emotionally abused: Not on file     Physically abused: Not on file     Forced sexual activity: Not on file   Other Topics Concern    Not on file   Social History Narrative    Always uses seatbelt    Caffeine use         Review of Systems:  Gen:   Denies fatigue, chills, nausea, vomiting, fever  Skin: No rashes or discolorations of any concern  RESP: Denies SOB, no cough     CV: Denies chest pain or palpitations  Breasts: Denies masses, pain, skin changes and nipple discharge  GI: Denies abdominal pain, heartburn, nausea, vomiting, changes in bowel habits  : Denies dysuria, frequency, CVA tenderness, incontinence and hematuria  Genitalia: Denies abnormal vaginal discharge, external lesions, rashes, pelvic pain, pressure, but positive for infrequent menstrual cycle  Rectal:  Denies pain, bleeding, hemorrhoids,    Physical Exam:  /70 (BP Location: Left arm, Patient Position: Sitting, Cuff Size: Standard)   Ht 5' 2" (1 575 m)   Wt 84 4 kg (186 lb)   LMP 08/23/2019   BMI 34 02 kg/m²    Gen: The patient was alert and oriented x3, pleasant well-appearing female in no acute distress  Neck:  Unremarkable, no lymphadenopathy, no thyromegaly, or tenderness  Breasts: Symmetric  No dominant, discrete, fixed  or suspicious masses are noted  No skin or nipple changes  No palpable axillary nodes  No supraclavicular adenopathy  Abd:  Soft, obese, nontender, nondistended, no masses or organomegaly  Back:  No CVA tenderness, no tenderness to palpation along spine  Pelvic:  Normal appearing external female genitalia, no visible lesions, no rashes  Vagina is free of discharge, normal vaginal epithelium, no abnormal  lesions, no evidence of prolapse anteriorly or posteriorly  Normal appearing cervix, mobile and nontender  A thin prep pap smear was not performed  Uterus is normal size, mobile and, nontender  No palpable adnexal masses or tenderness  No anoperineal lesions  Rectal:  No masses, tenderness, hemorrhoids, or obvious blood  Skin:  No concerning lesions  Extremeties: No edema      Assessment & Plan:   1  Routine annual exam      RTO one year orPRN  2  Oligomenorrhea  Patient was instructed to go straight to the hospital to have lab work done today which will include hemoglobin A1c, prolactin and estradiol levels      3  Probable diabetes, recheck hemoglobin A1c and refer back to family physician for this if still elevated  4  Prolactinemia  Await new level results

## 2019-11-12 ENCOUNTER — TELEPHONE (OUTPATIENT)
Dept: FAMILY MEDICINE CLINIC | Facility: CLINIC | Age: 39
End: 2019-11-12

## 2019-11-13 ENCOUNTER — TELEPHONE (OUTPATIENT)
Dept: FAMILY MEDICINE CLINIC | Facility: CLINIC | Age: 39
End: 2019-11-13

## 2019-11-13 LAB
EST. AVERAGE GLUCOSE BLD GHB EST-MCNC: 266 MG/DL
HBA1C MFR BLD: 10.9 % (ref 4.8–5.6)
PROLACTIN SERPL-MCNC: 6.2 NG/ML (ref 4.8–23.3)

## 2019-11-13 NOTE — TELEPHONE ENCOUNTER
Dr Hammond Began from GYN called stating that the patients A1C in 10 9  Per TW-- would like pt scheduled with PMM at the first available slot

## 2019-11-13 NOTE — TELEPHONE ENCOUNTER
Spoke with patients mother-- pt is to have Surgery on Friday 11/15/19 for Bone Spurs-- upon talking with Dr Chen Host advised to cancel surgery until New onset Diabetes is under control  Pt mother ok with Plan appt scheduled 11/18/19 with Dr Scarlett Lino

## 2019-11-14 ENCOUNTER — TELEPHONE (OUTPATIENT)
Dept: FAMILY MEDICINE CLINIC | Facility: CLINIC | Age: 39
End: 2019-11-14

## 2019-11-14 DIAGNOSIS — E11.9 TYPE 2 DIABETES MELLITUS WITHOUT COMPLICATION, WITHOUT LONG-TERM CURRENT USE OF INSULIN (HCC): Primary | ICD-10-CM

## 2019-11-14 RX ORDER — METFORMIN HYDROCHLORIDE 500 MG/1
TABLET, EXTENDED RELEASE ORAL
Qty: 65 TABLET | Refills: 1 | Status: SHIPPED | OUTPATIENT
Start: 2019-11-14 | End: 2019-12-23 | Stop reason: DRUGHIGH

## 2019-11-14 NOTE — TELEPHONE ENCOUNTER
----- Message from Bhanu Garcia MD sent at 11/14/2019  9:07 AM EST -----  A1c in diabetic range  I would have her start metformin  mg 1 tablet daily for 1 week then increase to 2 tablets daily  Will discuss further at office visit next week

## 2019-11-18 ENCOUNTER — OFFICE VISIT (OUTPATIENT)
Dept: FAMILY MEDICINE CLINIC | Facility: CLINIC | Age: 39
End: 2019-11-18
Payer: COMMERCIAL

## 2019-11-18 VITALS
HEIGHT: 62 IN | HEART RATE: 88 BPM | WEIGHT: 188 LBS | BODY MASS INDEX: 34.6 KG/M2 | OXYGEN SATURATION: 98 % | SYSTOLIC BLOOD PRESSURE: 124 MMHG | DIASTOLIC BLOOD PRESSURE: 82 MMHG

## 2019-11-18 DIAGNOSIS — E11.9 TYPE 2 DIABETES MELLITUS WITHOUT COMPLICATION, WITHOUT LONG-TERM CURRENT USE OF INSULIN (HCC): Primary | ICD-10-CM

## 2019-11-18 PROCEDURE — 99214 OFFICE O/P EST MOD 30 MIN: CPT | Performed by: FAMILY MEDICINE

## 2019-11-18 PROCEDURE — 3725F SCREEN DEPRESSION PERFORMED: CPT | Performed by: FAMILY MEDICINE

## 2019-11-18 PROCEDURE — 1036F TOBACCO NON-USER: CPT | Performed by: FAMILY MEDICINE

## 2019-11-18 NOTE — PROGRESS NOTES
[address]        NAME: Elise Baltazar is a 46 y o  female  : 1980    MRN: [id number]  DATE: 2019  TIME: 3:30 PM    Assessment and Plan   Type 2 diabetes mellitus without complication, without long-term current use of insulin ([facility] ) [E11 9]  1  Type 2 diabetes mellitus without complication, without long-term current use of insulin (HCC)  Comprehensive metabolic panel    Lipid Panel with Direct LDL reflex    Microalbumin / creatinine urine ratio    Comprehensive metabolic panel    Lipid Panel with Direct LDL reflex       Type 2 diabetes mellitus without complication, without long-term current use of insulin (HCC)    Lab Results   Component Value Date    HGBA1C 10 9 (H) 2019           Patient Instructions     Patient Instructions   I will check extra labs now to include CMP, lipid panel, and urine for protein  Continue on the same metformin dose  Improve her diet and increase her exercise  I would wait 2 months to repeat the A1c to see how were doing as far as the sugars  Contact the eye doctor as to when you would be due  Let them know your diabetic  You should have yearly eye exams due to diabetes  Chief Complaint     Chief Complaint   Patient presents with    Diabetes     Elevated A1C         History of Present Illness       Patient comes in today referred from the gynecologist   They had been monitoring hormone levels over the last year  Patient had an A1c in 2019 which was 7 1%  Repeat testing this month showed A1c of 10 6%  Patient had been started on metformin in the last week  Review of multiple laboratory studies show blood sugars generally in the 140-150 range  Patient also had been scheduled to get a foot surgery for plantar fasciitis/heel spurs  This has been postponed until sugars are under better control        Review of Systems   Review of Systems   Constitutional: Negative for activity change, appetite change, diaphoresis and fatigue  Respiratory: Negative for cough, chest tightness, shortness of breath and wheezing  Cardiovascular: Negative for chest pain, palpitations and leg swelling  Fast or slow heart rate   Gastrointestinal: Negative for abdominal pain, blood in stool, constipation, diarrhea, nausea and vomiting  Endocrine: Positive for polyphagia and polyuria  Genitourinary: Negative for difficulty urinating, dysuria and frequency  Musculoskeletal: Positive for gait problem  Negative for joint swelling and myalgias  Neurological: Negative for dizziness, light-headedness and headaches  Psychiatric/Behavioral: Negative for agitation, confusion, dysphoric mood and sleep disturbance  The patient is not nervous/anxious            Current Medications       Current Outpatient Medications:     aspirin 81 MG tablet, Take 81 mg by mouth daily, Disp: , Rfl:     flecainide (TAMBOCOR) 100 mg tablet, , Disp: , Rfl:     metFORMIN (GLUCOPHAGE-XR) 500 mg 24 hr tablet, Take 1 tablet daily for 1 week then increase to 2 tablets daily, Disp: 65 tablet, Rfl: 1    metoprolol succinate (TOPROL-XL) 25 mg 24 hr tablet, Take 25 mg by mouth daily, Disp: , Rfl:     chlorhexidine (PERIDEX) 0 12 % solution, , Disp: , Rfl:     diclofenac sodium (VOLTAREN) 1 %, diclofenac 1 % topical gel  APPLY 2 GRAM TO THE AFFECTED AREA(S) BY TOPICAL ROUTE 4 TIMES PER DAY as needed for pain, Disp: , Rfl:     meloxicam (MOBIC) 7 5 mg tablet, meloxicam 7 5 mg tablet, Disp: , Rfl:     Current Allergies     Allergies as of 11/18/2019    (No Known Allergies)            The following portions of the patient's history were reviewed and updated as appropriate: allergies, current medications, past family history, past medical history, past social history, past surgical history and problem list      Past Medical History:   Diagnosis Date    Adnexal cyst     left    History of atrial fibrillation     ablation done, takes metoprolol    Irregular periods     Varicella        Past Surgical History:   Procedure Laterality Date    CARDIAC ELECTROPHYSIOLOGY STUDY AND ABLATION      CATARACT EXTRACTION      OTHER SURGICAL HISTORY      catheter ablation atrial fibrillation    OVARIAN CYST SURGERY Right 2/2/2018    Procedure: LAPAROSCOPIC PARAOVARIAN  CYSTECTOMY;  Surgeon: Farrukh Song MD;  Location:  MAIN OR;  Service: Gynecology       Family History   Problem Relation Age of Onset    Heart disease Father     Hypertension Maternal Grandmother     Heart disease Family     Cancer Family     Thyroid disease Family     Substance Abuse Neg Hx     Mental illness Neg Hx          Medications have been verified  Objective   /82   Pulse 88   Ht 5' 2" (1 575 m)   Wt 85 3 kg (188 lb)   SpO2 98%   BMI 34 39 kg/m²        Physical Exam     Physical Exam   Constitutional: She is oriented to person, place, and time  She appears well-developed and well-nourished  No distress  HENT:   Head: Normocephalic and atraumatic  Right Ear: Tympanic membrane and external ear normal  No drainage  Left Ear: Tympanic membrane normal  No drainage  Mouth/Throat: No oropharyngeal exudate  Eyes: Conjunctivae and EOM are normal  Right eye exhibits no discharge  Left eye exhibits no discharge  Neck: Normal range of motion  Neck supple  No thyromegaly present  Cardiovascular: Normal rate, regular rhythm and normal heart sounds  Pulses are no weak pulses  Pulses:       Dorsalis pedis pulses are 2+ on the right side, and 2+ on the left side  Posterior tibial pulses are 2+ on the right side, and 2+ on the left side  Pulmonary/Chest: Effort normal  No respiratory distress  She has no wheezes  She has no rales  Feet:   Right Foot:   Skin Integrity: Negative for ulcer, skin breakdown, erythema, warmth, callus or dry skin  Left Foot:   Skin Integrity: Negative for ulcer, skin breakdown, erythema, warmth, callus or dry skin  Lymphadenopathy:     She has no cervical adenopathy  Neurological: She is alert and oriented to person, place, and time  Skin: Skin is dry  Psychiatric: She has a normal mood and affect  Her behavior is normal        Patient's shoes and socks removed  Right Foot/Ankle   Right Foot Inspection  Skin Exam: skin normal and skin intact no dry skin, no warmth, no callus, no erythema, no maceration, no abnormal color, no pre-ulcer, no ulcer and no callus                            Sensory   Vibration: intact    Monofilament testing: intact  Vascular  Capillary refills: < 3 seconds  The right DP pulse is 2+  The right PT pulse is 2+  Left Foot/Ankle  Left Foot Inspection  Skin Exam: skin normal and skin intactno dry skin, no warmth, no erythema, no maceration, normal color, no pre-ulcer, no ulcer and no callus                                         Sensory   Vibration: intact    Monofilament: intact  Vascular  Capillary refills: < 3 seconds  The left DP pulse is 2+  The left PT pulse is 2+  Assign Risk Category:  No deformity present; No loss of protective sensation;  No weak pulses       Risk: 0

## 2019-11-18 NOTE — PATIENT INSTRUCTIONS
I will check extra labs now to include CMP, lipid panel, and urine for protein  Continue on the same metformin dose  Improve her diet and increase her exercise  I would wait 2 months to repeat the A1c to see how were doing as far as the sugars  Contact the eye doctor as to when you would be due  Let them know your diabetic  You should have yearly eye exams due to diabetes

## 2019-11-20 DIAGNOSIS — N91.2 AMENORRHEA: Primary | ICD-10-CM

## 2019-11-20 LAB
ESTRADIOL FREE MFR SERPL: 2.5 %
ESTRADIOL FREE SERPL-MCNC: 0.45 PG/ML
ESTRADIOL SERPL HS-MCNC: 18 PG/ML

## 2019-12-04 LAB
LEFT EYE DIABETIC RETINOPATHY: NORMAL
RIGHT EYE DIABETIC RETINOPATHY: NORMAL

## 2019-12-04 PROCEDURE — 2023F DILAT RTA XM W/O RTNOPTHY: CPT | Performed by: FAMILY MEDICINE

## 2019-12-17 LAB
ALBUMIN SERPL-MCNC: 4.3 G/DL (ref 3.5–5.5)
ALBUMIN/CREAT UR: <4 MG/G CREAT (ref 0–30)
ALBUMIN/GLOB SERPL: 2 {RATIO} (ref 1.2–2.2)
ALP SERPL-CCNC: 110 IU/L (ref 39–117)
ALT SERPL-CCNC: 111 IU/L (ref 0–32)
AST SERPL-CCNC: 75 IU/L (ref 0–40)
BILIRUB SERPL-MCNC: 1 MG/DL (ref 0–1.2)
BUN SERPL-MCNC: 12 MG/DL (ref 6–20)
BUN/CREAT SERPL: 17 (ref 9–23)
CALCIUM SERPL-MCNC: 9.9 MG/DL (ref 8.7–10.2)
CHLORIDE SERPL-SCNC: 103 MMOL/L (ref 96–106)
CHOLEST SERPL-MCNC: 201 MG/DL (ref 100–199)
CO2 SERPL-SCNC: 22 MMOL/L (ref 20–29)
CREAT SERPL-MCNC: 0.71 MG/DL (ref 0.57–1)
CREAT UR-MCNC: 75.3 MG/DL
FSH SERPL-ACNC: 9.8 MIU/ML
GLOBULIN SER-MCNC: 2.2 G/DL (ref 1.5–4.5)
GLUCOSE SERPL-MCNC: 166 MG/DL (ref 65–99)
HDLC SERPL-MCNC: 32 MG/DL
LDLC SERPL CALC-MCNC: ABNORMAL MG/DL (ref 0–99)
LDLC SERPL DIRECT ASSAY-MCNC: 74 MG/DL (ref 0–99)
LDLC/HDLC SERPL: ABNORMAL RATIO
LH SERPL-ACNC: 4 MIU/ML
MICROALBUMIN UR-MCNC: <3 UG/ML
POTASSIUM SERPL-SCNC: 4.3 MMOL/L (ref 3.5–5.2)
PROT SERPL-MCNC: 6.5 G/DL (ref 6–8.5)
SL AMB EGFR AFRICAN AMERICAN: 125 ML/MIN/1.73
SL AMB EGFR NON AFRICAN AMERICAN: 108 ML/MIN/1.73
SL AMB VLDL CHOLESTEROL CALC: ABNORMAL MG/DL (ref 5–40)
SODIUM SERPL-SCNC: 143 MMOL/L (ref 134–144)
TRIGL SERPL-MCNC: 660 MG/DL (ref 0–149)

## 2019-12-19 NOTE — RESULT ENCOUNTER NOTE
Call patient with lab result-sugars are still high  Should be taking metformin  mg 2 tabs daily  Liver enzymes are elevated along with high triglycerides which suggest fatty liver  She should increase her metformin to 1000 mg twice daily  I would also advise her to start fenofibrate 145 mg once daily with a meal   Also continue to work on the diet  If she is willing to do this place orders for me to sign on Monday when I return she can use her current metformin 500 mg 2 tablets twice daily  I would start her on the regular metformin 1000 mg twice daily with a prescription  Will then follow up at her visit in January and I will order more labs at that point

## 2019-12-20 ENCOUNTER — TELEPHONE (OUTPATIENT)
Dept: FAMILY MEDICINE CLINIC | Facility: CLINIC | Age: 39
End: 2019-12-20

## 2019-12-20 DIAGNOSIS — E13.69 OTHER SPECIFIED DIABETES MELLITUS WITH OTHER SPECIFIED COMPLICATION, UNSPECIFIED WHETHER LONG TERM INSULIN USE (HCC): ICD-10-CM

## 2019-12-20 DIAGNOSIS — E78.5 HYPERLIPIDEMIA, UNSPECIFIED HYPERLIPIDEMIA TYPE: Primary | ICD-10-CM

## 2019-12-20 NOTE — TELEPHONE ENCOUNTER
----- Message from Latisha Baker MD sent at 12/19/2019  5:12 PM EST -----  Call patient with lab result-sugars are still high  Should be taking metformin  mg 2 tabs daily  Liver enzymes are elevated along with high triglycerides which suggest fatty liver  She should increase her metformin to 1000 mg twice daily  I would also advise her to start fenofibrate 145 mg once daily with a meal   Also continue to work on the diet  If she is willing to do this place orders for me to sign on Monday when I return she can use her current metformin 500 mg 2 tablets twice daily  I would start her on the regular metformin 1000 mg twice daily with a prescription  Will then follow up at her visit in January and I will order more labs at that point

## 2019-12-23 RX ORDER — FENOFIBRATE 145 MG/1
145 TABLET, COATED ORAL DAILY
Qty: 30 TABLET | Refills: 1 | Status: SHIPPED | OUTPATIENT
Start: 2019-12-23 | End: 2020-01-20 | Stop reason: SDUPTHER

## 2019-12-23 NOTE — TELEPHONE ENCOUNTER
Pt aware of results   Would like meds sent to [facility]     Metformin 1000mg twice a day   Fenofibrate 145mg daily

## 2020-01-17 RX ORDER — LANCETS
EACH MISCELLANEOUS
Refills: 5 | COMMUNITY
Start: 2019-11-18 | End: 2020-05-19

## 2020-01-17 RX ORDER — BLOOD-GLUCOSE METER
EACH MISCELLANEOUS DAILY
Refills: 0 | COMMUNITY
Start: 2019-11-18 | End: 2020-05-19

## 2020-01-17 RX ORDER — PREDNISOLONE ACETATE 10 MG/ML
SUSPENSION/ DROPS OPHTHALMIC
Refills: 0 | COMMUNITY
Start: 2019-12-04 | End: 2020-06-01 | Stop reason: ALTCHOICE

## 2020-01-17 RX ORDER — BLOOD SUGAR DIAGNOSTIC
STRIP MISCELLANEOUS
Refills: 5 | COMMUNITY
Start: 2019-11-18 | End: 2020-08-17

## 2020-01-17 RX ORDER — SALICYLIC ACID 275 MG/ML
SOLUTION TOPICAL
COMMUNITY
End: 2020-06-01 | Stop reason: ALTCHOICE

## 2020-01-20 ENCOUNTER — OFFICE VISIT (OUTPATIENT)
Dept: FAMILY MEDICINE CLINIC | Facility: CLINIC | Age: 40
End: 2020-01-20
Payer: COMMERCIAL

## 2020-01-20 VITALS
HEART RATE: 74 BPM | OXYGEN SATURATION: 98 % | SYSTOLIC BLOOD PRESSURE: 122 MMHG | WEIGHT: 187 LBS | HEIGHT: 62 IN | BODY MASS INDEX: 34.41 KG/M2 | DIASTOLIC BLOOD PRESSURE: 84 MMHG

## 2020-01-20 DIAGNOSIS — E78.1 HYPERTRIGLYCERIDEMIA: ICD-10-CM

## 2020-01-20 DIAGNOSIS — I48.0 PAROXYSMAL ATRIAL FIBRILLATION (HCC): ICD-10-CM

## 2020-01-20 DIAGNOSIS — E11.9 TYPE 2 DIABETES MELLITUS WITHOUT COMPLICATION, WITHOUT LONG-TERM CURRENT USE OF INSULIN (HCC): Primary | ICD-10-CM

## 2020-01-20 PROCEDURE — 3008F BODY MASS INDEX DOCD: CPT | Performed by: FAMILY MEDICINE

## 2020-01-20 PROCEDURE — 3725F SCREEN DEPRESSION PERFORMED: CPT | Performed by: FAMILY MEDICINE

## 2020-01-20 PROCEDURE — 99214 OFFICE O/P EST MOD 30 MIN: CPT | Performed by: FAMILY MEDICINE

## 2020-01-20 RX ORDER — FENOFIBRATE 145 MG/1
145 TABLET, COATED ORAL DAILY
Qty: 30 TABLET | Refills: 5 | Status: SHIPPED | OUTPATIENT
Start: 2020-01-20 | End: 2020-03-19 | Stop reason: SDUPTHER

## 2020-01-20 NOTE — PROGRESS NOTES
[id number]         NAME: Victoria Crowe is a 46 y o  female  : 1980    MRN: [id number]  DATE: 2020  TIME: 3:12 PM    Assessment and Plan   Type 2 diabetes mellitus without complication, without long-term current use of insulin ([address] ) [E11 9]  1  Type 2 diabetes mellitus without complication, without long-term current use of insulin (HCC)  metFORMIN (GLUCOPHAGE) 1000 MG tablet    sitaGLIPtin (JANUVIA) 100 mg tablet    Hemoglobin A1C    Comprehensive metabolic panel    Hemoglobin A1C    Comprehensive metabolic panel   2  Paroxysmal atrial fibrillation (HCC)     3  Hypertriglyceridemia  fenofibrate (TRICOR) 145 mg tablet    Comprehensive metabolic panel    Lipid Panel with Direct LDL reflex    Comprehensive metabolic panel    Lipid Panel with Direct LDL reflex   4  BMI 34 0-34 9,adult         No problem-specific Assessment & Plan notes found for this encounter  Patient Instructions     Patient Instructions   Add Januvia 100 mg daily  Continue other medications  Repeat labs in 2-2 1/2  Months  Heart Healthy Diet   AMBULATORY CARE:   A heart healthy diet  is an eating plan low in total fat, unhealthy fats, and sodium (salt)  A heart healthy diet helps decrease your risk for heart disease and stroke  Limit the amount of fat you eat to 25% to 35% of your total daily calories  Limit sodium to less than 2,300 mg each day  Healthy fats:  Healthy fats can help improve cholesterol levels  The risk for heart disease is decreased when cholesterol levels are normal  Choose healthy fats, such as the following:  · Unsaturated fat  is found in foods such as soybean, canola, olive, corn, and safflower oils  It is also found in soft tub margarine that is made with liquid vegetable oil  · Omega-3 fat  is found in certain fish, such as salmon, tuna, and trout, and in walnuts and flaxseed    Unhealthy fats:  Unhealthy fats can cause unhealthy cholesterol levels in your blood and increase your risk of heart disease  Limit unhealthy fats, such as the following:  · Cholesterol  is found in animal foods, such as eggs and lobster, and in dairy products made from whole milk  Limit cholesterol to less than 300 milligrams (mg) each day  You may need to limit cholesterol to 200 mg each day if you have heart disease  · Saturated fat  is found in meats, such as green and hamburger  It is also found in chicken or turkey skin, whole milk, and butter  Limit saturated fat to less than 7% of your total daily calories  Limit saturated fat to less than 6% if you have heart disease or are at increased risk for it  · Trans fat  is found in packaged foods, such as potato chips and cookies  It is also in hard margarine, some fried foods, and shortening  Avoid trans fats as much as possible    Heart healthy foods and drinks to include:  Ask your dietitian or healthcare provider how many servings to have from each of the following food groups:  · Grains:      ¨ Whole-wheat breads, cereals, and pastas, and brown rice    ¨ Low-fat, low-sodium crackers and chips    · Vegetables:      ¨ Broccoli, green beans, green peas, and spinach    ¨ Collards, kale, and lima beans    ¨ Carrots, sweet potatoes, tomatoes, and peppers    ¨ Canned vegetables with no salt added    · Fruits:      ¨ Bananas, peaches, pears, and pineapple    ¨ Grapes, raisins, and dates    ¨ Oranges, tangerines, grapefruit, orange juice, and grapefruit juice    ¨ Apricots, mangoes, melons, and papaya    ¨ Raspberries and strawberries    ¨ Canned fruit with no added sugar    · Low-fat dairy products:      ¨ Nonfat (skim) milk, 1% milk, and low-fat almond, cashew, or soy milks fortified with calcium    ¨ Low-fat cheese, regular or frozen yogurt, and cottage cheese    · Meats and proteins , such as lean cuts of beef and pork (loin, leg, round), skinless chicken and turkey, legumes, soy products, egg whites, and nuts  Foods and drinks to limit or avoid:  Ask your dietitian or healthcare provider about these and other foods that are high in unhealthy fat, sodium, and sugar:  · Snack or packaged foods , such as frozen dinners, cookies, macaroni and cheese, and cereals with more than 300 mg of sodium per serving    · Canned or dry mixes  for cakes, soups, sauces, or gravies    · Vegetables with added sodium , such as instant potatoes, vegetables with added sauces, or regular canned vegetables    · Other foods high in sodium , such as ketchup, barbecue sauce, salad dressing, pickles, olives, soy sauce, and miso    · High-fat dairy foods  such as whole or 2% milk, cream cheese, or sour cream, and cheeses     · High-fat protein foods  such as high-fat cuts of beef (T-bone steaks, ribs), chicken or turkey with skin, and organ meats, such as liver    · Cured or smoked meats , such as hot dogs, green, and sausage    · Unhealthy fats and oils , such as butter, stick margarine, shortening, and cooking oils such as coconut or palm oil    · Food and drinks high in sugar , such as soft drinks (soda), sports drinks, sweetened tea, candy, cake, cookies, pies, and doughnuts  Other diet guidelines to follow:   · Eat more foods containing omega-3 fats  Eat fish high in omega-3 fats at least 2 times a week  · Limit alcohol  Too much alcohol can damage your heart and raise your blood pressure  Women should limit alcohol to 1 drink a day  Men should limit alcohol to 2 drinks a day  A drink of alcohol is 12 ounces of beer, 5 ounces of wine, or 1½ ounces of liquor  · Choose low-sodium foods  High-sodium foods can lead to high blood pressure  Add little or no salt to food you prepare  Use herbs and spices in place of salt  · Eat more fiber  to help lower cholesterol levels  Eat at least 5 servings of fruits and vegetables each day  Eat 3 ounces of whole-grain foods each day  Legumes (beans) are also a good source of fiber    Lifestyle guidelines:   · Do not smoke   Nicotine and other chemicals in cigarettes and cigars can cause lung and heart damage  Ask your healthcare provider for information if you currently smoke and need help to quit  E-cigarettes or smokeless tobacco still contain nicotine  Talk to your healthcare provider before you use these products  · Exercise regularly  to help you maintain a healthy weight and improve your blood pressure and cholesterol levels  Ask your healthcare provider about the best exercise plan for you  Do not start an exercise program without asking your healthcare provider  Follow up with your healthcare provider as directed:  Write down your questions so you remember to ask them during your visits  © 2017 [facility] Information is for End User's use only and may not be sold, redistributed or otherwise used for commercial purposes  All illustrations and images included in CareNotes® are the copyrighted property of A D A M [employer]  or [employer]  The above information is an  only  It is not intended as medical advice for individual conditions or treatments  Talk to your doctor, nurse or pharmacist before following any medical regimen to see if it is safe and effective for you  Chief Complaint     Chief Complaint   Patient presents with    Follow-up    Diabetes         History of Present Illness       Medical management 3 month check-  1) diabetes-A1c still very high  Patient taking only metformin  Sugars at home are still running high close to 200  Microalbumin is negative  2) high triglycerides-last levels are very high due to have these recheck  Currently on fenofibrate  3) paroxysmal atrial fibrillation-this is followed by Cardiology  Review of Systems   Review of Systems   Constitutional: Negative for activity change, appetite change, diaphoresis and fatigue  Respiratory: Negative for cough, chest tightness, shortness of breath and wheezing  Cardiovascular: Negative for chest pain, palpitations and leg swelling  Fast or slow heart rate   Gastrointestinal: Negative for abdominal pain, blood in stool, constipation, diarrhea, nausea and vomiting  Genitourinary: Positive for frequency  Negative for difficulty urinating, dysuria, hematuria and vaginal discharge  Neurological: Negative for dizziness, light-headedness and headaches  Psychiatric/Behavioral: Negative for agitation, confusion, dysphoric mood and sleep disturbance  The patient is not nervous/anxious            Current Medications       Current Outpatient Medications:     ACCU-CHEK FASTCLIX LANCETS MIS, USE 1 NEW LANCET TO CHECK GLUCOSE ONCE DAILY, Disp: , Rfl: 5    ACCU-CHEK GUIDE test strip, USE 1 STRIP TO CHECK GLUCOSE ONCE DAILY, Disp: , Rfl: 5    aspirin 81 MG tablet, Take 81 mg by mouth daily, Disp: , Rfl:     Blood Glucose Monitoring Suppl (ACCU-CHEK GUIDE) w/Device KIT, daily Test blood sugar, Disp: , Rfl: 0    chlorhexidine (PERIDEX) 0 12 % solution, , Disp: , Rfl:     fenofibrate (TRICOR) 145 mg tablet, Take 1 tablet (145 mg total) by mouth daily, Disp: 30 tablet, Rfl: 5    flecainide (TAMBOCOR) 100 mg tablet, , Disp: , Rfl:     meloxicam (MOBIC) 7 5 mg tablet, meloxicam 7 5 mg tablet, Disp: , Rfl:     metFORMIN (GLUCOPHAGE) 1000 MG tablet, Take 1 tablet (1,000 mg total) by mouth 2 (two) times a day with meals, Disp: 60 tablet, Rfl: 5    metoprolol succinate (TOPROL-XL) 25 mg 24 hr tablet, Take 25 mg by mouth daily, Disp: , Rfl:     prednisoLONE acetate (PRED FORTE) 1 % ophthalmic suspension, INSTILL 1 DROP INTO LEFT EYE 4 TIMES DAILY FOR 3 DAYS STARTING AFTER LASER PROCEDURE, Disp: , Rfl: 0    diclofenac sodium (VOLTAREN) 1 %, diclofenac 1 % topical gel  APPLY 2 GRAM TO THE AFFECTED AREA(S) BY TOPICAL ROUTE 4 TIMES PER DAY as needed for pain, Disp: , Rfl:     Salicylic Acid 21 6 % LIQD, salicylic acid 11 9 % topical film-forming liquid  APPLY BY TOPICAL ROUTE ONCE DAILY ;COVER ENTIRE WART SURFACE WITH 1 application at night, Disp: , Rfl:     sitaGLIPtin (JANUVIA) 100 mg tablet, Take 1 tablet (100 mg total) by mouth daily, Disp: 30 tablet, Rfl: 5    Current Allergies     Allergies as of 01/20/2020    (No Known Allergies)            The following portions of the patient's history were reviewed and updated as appropriate: allergies, current medications, past family history, past medical history, past social history, past surgical history and problem list      Past Medical History:   Diagnosis Date    Adnexal cyst     left    History of atrial fibrillation     ablation done, takes metoprolol    Irregular periods     Varicella        Past Surgical History:   Procedure Laterality Date    CARDIAC ELECTROPHYSIOLOGY STUDY AND ABLATION      CATARACT EXTRACTION      EYE SURGERY      OTHER SURGICAL HISTORY      catheter ablation atrial fibrillation    OVARIAN CYST SURGERY Right 2/2/2018    Procedure: LAPAROSCOPIC PARAOVARIAN  CYSTECTOMY;  Surgeon: Susan Beasley MD;  [facility] OR;  Service: Gynecology       Family History   Problem Relation Age of Onset    Heart disease Father     Hypertension Maternal Grandmother     Heart disease Family     Cancer Family     Thyroid disease Family     Substance Abuse Neg Hx     Mental illness Neg Hx          Medications have been verified  Objective   /84   Pulse 74   Ht 5' 2" (1 575 m)   Wt 84 8 kg (187 lb)   SpO2 98%   BMI 34 20 kg/m²        Physical Exam     Physical Exam   Constitutional: She is oriented to person, place, and time  She appears well-developed and well-nourished  No distress  HENT:   Head: Normocephalic and atraumatic  Right Ear: Tympanic membrane, external ear and ear canal normal    Left Ear: Tympanic membrane, external ear and ear canal normal    Nose: No mucosal edema or rhinorrhea  Right sinus exhibits no maxillary sinus tenderness   Left sinus exhibits no maxillary sinus tenderness  Mouth/Throat: Uvula is midline  Mucous membranes are not pale and not dry  Normal dentition  No oropharyngeal exudate  Eyes: Pupils are equal, round, and reactive to light  EOM are normal  Right eye exhibits no discharge  Left eye exhibits no discharge  Neck: Normal range of motion  Neck supple  No thyromegaly present  Cardiovascular: Normal rate, regular rhythm, normal heart sounds and intact distal pulses  No murmur heard  Pulmonary/Chest: Effort normal and breath sounds normal  No respiratory distress  She has no wheezes  She has no rales  Musculoskeletal: Normal range of motion  She exhibits no edema or tenderness  Lymphadenopathy:     She has no cervical adenopathy  Neurological: She is alert and oriented to person, place, and time  No cranial nerve deficit  Skin: She is not diaphoretic  Psychiatric: She has a normal mood and affect  Her behavior is normal                BMI Counseling: Body mass index is 34 2 kg/m²  The BMI is above normal  Nutrition recommendations include reducing portion sizes, decreasing overall calorie intake, 3-5 servings of fruits/vegetables daily and moderation in carbohydrate intake  BMI Counseling: Body mass index is 34 2 kg/m²  The BMI is above normal  Nutrition recommendations include decreasing portion sizes, encouraging healthy choices of fruits and vegetables, moderation in carbohydrate intake and increasing intake of lean protein  Exercise recommendations include moderate physical activity 150 minutes/week  No pharmacotherapy was ordered  Patient referred to PCP due to patient being overweight

## 2020-01-20 NOTE — PATIENT INSTRUCTIONS
Add Januvia 100 mg daily  Continue other medications  Repeat labs in 2-2 1/2  Months  Heart Healthy Diet   AMBULATORY CARE:   A heart healthy diet  is an eating plan low in total fat, unhealthy fats, and sodium (salt)  A heart healthy diet helps decrease your risk for heart disease and stroke  Limit the amount of fat you eat to 25% to 35% of your total daily calories  Limit sodium to less than 2,300 mg each day  Healthy fats:  Healthy fats can help improve cholesterol levels  The risk for heart disease is decreased when cholesterol levels are normal  Choose healthy fats, such as the following:  · Unsaturated fat  is found in foods such as soybean, canola, olive, corn, and safflower oils  It is also found in soft tub margarine that is made with liquid vegetable oil  · Omega-3 fat  is found in certain fish, such as salmon, tuna, and trout, and in walnuts and flaxseed  Unhealthy fats:  Unhealthy fats can cause unhealthy cholesterol levels in your blood and increase your risk of heart disease  Limit unhealthy fats, such as the following:  · Cholesterol  is found in animal foods, such as eggs and lobster, and in dairy products made from whole milk  Limit cholesterol to less than 300 milligrams (mg) each day  You may need to limit cholesterol to 200 mg each day if you have heart disease  · Saturated fat  is found in meats, such as green and hamburger  It is also found in chicken or turkey skin, whole milk, and butter  Limit saturated fat to less than 7% of your total daily calories  Limit saturated fat to less than 6% if you have heart disease or are at increased risk for it  · Trans fat  is found in packaged foods, such as potato chips and cookies  It is also in hard margarine, some fried foods, and shortening  Avoid trans fats as much as possible    Heart healthy foods and drinks to include:  Ask your dietitian or healthcare provider how many servings to have from each of the following food groups:  · Grains:      ¨ Whole-wheat breads, cereals, and pastas, and brown rice    ¨ Low-fat, low-sodium crackers and chips    · Vegetables:      ¨ Broccoli, green beans, green peas, and spinach    ¨ Collards, kale, and lima beans    ¨ Carrots, sweet potatoes, tomatoes, and peppers    ¨ Canned vegetables with no salt added    · Fruits:      ¨ Bananas, peaches, pears, and pineapple    ¨ Grapes, raisins, and dates    ¨ Oranges, tangerines, grapefruit, orange juice, and grapefruit juice    ¨ Apricots, mangoes, melons, and papaya    ¨ Raspberries and strawberries    ¨ Canned fruit with no added sugar    · Low-fat dairy products:      ¨ Nonfat (skim) milk, 1% milk, and low-fat almond, cashew, or soy milks fortified with calcium    ¨ Low-fat cheese, regular or frozen yogurt, and cottage cheese    · Meats and proteins , such as lean cuts of beef and pork (loin, leg, round), skinless chicken and turkey, legumes, soy products, egg whites, and nuts  Foods and drinks to limit or avoid:  Ask your dietitian or healthcare provider about these and other foods that are high in unhealthy fat, sodium, and sugar:  · Snack or packaged foods , such as frozen dinners, cookies, macaroni and cheese, and cereals with more than 300 mg of sodium per serving    · Canned or dry mixes  for cakes, soups, sauces, or gravies    · Vegetables with added sodium , such as instant potatoes, vegetables with added sauces, or regular canned vegetables    · Other foods high in sodium , such as ketchup, barbecue sauce, salad dressing, pickles, olives, soy sauce, and miso    · High-fat dairy foods  such as whole or 2% milk, cream cheese, or sour cream, and cheeses     · High-fat protein foods  such as high-fat cuts of beef (T-bone steaks, ribs), chicken or turkey with skin, and organ meats, such as liver    · Cured or smoked meats , such as hot dogs, green, and sausage    · Unhealthy fats and oils , such as butter, stick margarine, shortening, and cooking oils such as coconut or palm oil    · Food and drinks high in sugar , such as soft drinks (soda), sports drinks, sweetened tea, candy, cake, cookies, pies, and doughnuts  Other diet guidelines to follow:   · Eat more foods containing omega-3 fats  Eat fish high in omega-3 fats at least 2 times a week  · Limit alcohol  Too much alcohol can damage your heart and raise your blood pressure  Women should limit alcohol to 1 drink a day  Men should limit alcohol to 2 drinks a day  A drink of alcohol is 12 ounces of beer, 5 ounces of wine, or 1½ ounces of liquor  · Choose low-sodium foods  High-sodium foods can lead to high blood pressure  Add little or no salt to food you prepare  Use herbs and spices in place of salt  · Eat more fiber  to help lower cholesterol levels  Eat at least 5 servings of fruits and vegetables each day  Eat 3 ounces of whole-grain foods each day  Legumes (beans) are also a good source of fiber  Lifestyle guidelines:   · Do not smoke  Nicotine and other chemicals in cigarettes and cigars can cause lung and heart damage  Ask your healthcare provider for information if you currently smoke and need help to quit  E-cigarettes or smokeless tobacco still contain nicotine  Talk to your healthcare provider before you use these products  · Exercise regularly  to help you maintain a healthy weight and improve your blood pressure and cholesterol levels  Ask your healthcare provider about the best exercise plan for you  Do not start an exercise program without asking your healthcare provider  Follow up with your healthcare provider as directed:  Write down your questions so you remember to ask them during your visits  © 2017 [address]  Information is for End User's use only and may not be sold, redistributed or otherwise used for commercial purposes   All illustrations and images included in CareNotes® are the copyrighted property of [employer]  or [employer]  The above information is an  only  It is not intended as medical advice for individual conditions or treatments  Talk to your doctor, nurse or pharmacist before following any medical regimen to see if it is safe and effective for you

## 2020-02-03 ENCOUNTER — TELEPHONE (OUTPATIENT)
Dept: FAMILY MEDICINE CLINIC | Facility: CLINIC | Age: 40
End: 2020-02-03

## 2020-02-03 ENCOUNTER — OFFICE VISIT (OUTPATIENT)
Dept: FAMILY MEDICINE CLINIC | Facility: CLINIC | Age: 40
End: 2020-02-03
Payer: COMMERCIAL

## 2020-02-03 VITALS
SYSTOLIC BLOOD PRESSURE: 124 MMHG | BODY MASS INDEX: 34.23 KG/M2 | OXYGEN SATURATION: 98 % | HEIGHT: 62 IN | HEART RATE: 88 BPM | WEIGHT: 186 LBS | DIASTOLIC BLOOD PRESSURE: 80 MMHG

## 2020-02-03 DIAGNOSIS — E11.9 TYPE 2 DIABETES MELLITUS WITHOUT COMPLICATION, WITHOUT LONG-TERM CURRENT USE OF INSULIN (HCC): Primary | ICD-10-CM

## 2020-02-03 DIAGNOSIS — E78.1 HYPERTRIGLYCERIDEMIA: ICD-10-CM

## 2020-02-03 DIAGNOSIS — I48.0 PAROXYSMAL ATRIAL FIBRILLATION (HCC): ICD-10-CM

## 2020-02-03 PROCEDURE — 99214 OFFICE O/P EST MOD 30 MIN: CPT | Performed by: FAMILY MEDICINE

## 2020-02-03 PROCEDURE — 1036F TOBACCO NON-USER: CPT | Performed by: FAMILY MEDICINE

## 2020-02-03 PROCEDURE — 3008F BODY MASS INDEX DOCD: CPT | Performed by: FAMILY MEDICINE

## 2020-02-03 PROCEDURE — 2022F DILAT RTA XM EVC RTNOPTHY: CPT | Performed by: FAMILY MEDICINE

## 2020-02-03 NOTE — PATIENT INSTRUCTIONS
Continue current medications  Start the [address] [facility] [address] when it is pre authorize  Continue to work on the diet, exercise, and weight loss  Postpone laboratory testing into the end of April  Office visit after that

## 2020-02-03 NOTE — TELEPHONE ENCOUNTER
Prior-Auth for Januvia 10 MG completed and send to plan via [employer] and [employer]   Waiting on approval

## 2020-02-03 NOTE — PROGRESS NOTES
[id number]        NAME: Sonali Langford is a 46 y o  female  : 1980    MRN: [id number]  DATE: February 3, 2020  TIME: 2:11 PM    Assessment and Plan   Type 2 diabetes mellitus without complication, without long-term current use of insulin ([address] ) [E11 9]  1  Type 2 diabetes mellitus without complication, without long-term current use of insulin ([address] )     2  Hypertriglyceridemia     3  Paroxysmal atrial fibrillation (HCC)         No problem-specific Assessment & Plan notes found for this encounter  Patient Instructions     Patient Instructions   Continue current medications  [facility] pre [facility]  Continue to work on the diet, exercise, and weight loss  Postpone laboratory testing into the end of April  Office visit after that  Chief Complaint     Chief Complaint   Patient presents with    Follow-up         History of Present Illness       Patient comes in today for follow-up on diabetes-  1) diabetes-fasting sugar still running in the 180-220 range  Unfortunately the Januvia was not obtained due to mixup with pre authorization request from the pharmacy  She does continue take her metformin  She has joined a gym is continue to work on her exercise  2) hypertriglyceridemia-patient tolerating current medication  Will be due recheck in 3 months  3) paroxysmal atrial fibrillation-currently under control  Does see Cardiology  Review of Systems   Review of Systems   Constitutional: Negative for appetite change, chills, diaphoresis and fever  HENT: Negative for ear pain, rhinorrhea, sinus pressure and sore throat  Eyes: Negative for discharge, redness and itching  Respiratory: Negative for cough, shortness of breath and wheezing  Cardiovascular: Negative for chest pain and palpitations  Rapid or slow heart rate   Gastrointestinal: Negative for abdominal pain, diarrhea, nausea and vomiting     Neurological: Negative for dizziness, light-headedness and headaches           Current Medications       Current Outpatient Medications:     ACCU-CHEK FASTCLIX LANCETS [facility], USE 1 NEW LANCET TO CHECK GLUCOSE ONCE DAILY, Disp: , Rfl: 5    ACCU-CHEK GUIDE test strip, USE 1 STRIP TO CHECK GLUCOSE ONCE DAILY, Disp: , Rfl: 5    aspirin 81 MG tablet, Take 81 mg by mouth daily, Disp: , Rfl:     Blood Glucose Monitoring Suppl (ACCU-CHEK GUIDE) w/Device KIT, daily Test blood sugar, Disp: , Rfl: 0    chlorhexidine (PERIDEX) 0 12 % solution, , Disp: , Rfl:     diclofenac sodium (VOLTAREN) 1 %, diclofenac 1 % topical gel  APPLY 2 GRAM TO THE AFFECTED AREA(S) BY TOPICAL ROUTE 4 TIMES PER DAY as needed for pain, Disp: , Rfl:     fenofibrate (TRICOR) 145 mg tablet, Take 1 tablet (145 mg total) by mouth daily, Disp: 30 tablet, Rfl: 5    flecainide (TAMBOCOR) 100 mg tablet, , Disp: , Rfl:     meloxicam (MOBIC) 7 5 mg tablet, meloxicam 7 5 mg tablet, Disp: , Rfl:     metFORMIN (GLUCOPHAGE) 1000 MG tablet, Take 1 tablet (1,000 mg total) by mouth 2 (two) times a day with meals, Disp: 60 tablet, Rfl: 5    metoprolol succinate (TOPROL-XL) 25 mg 24 hr tablet, Take 25 mg by mouth daily, Disp: , Rfl:     prednisoLONE acetate (PRED FORTE) 1 % ophthalmic suspension, INSTILL 1 DROP INTO LEFT EYE 4 TIMES DAILY FOR 3 DAYS STARTING AFTER LASER PROCEDURE, Disp: , Rfl: 0    Salicylic Acid 44 6 % LIQD, salicylic acid 70 6 % topical film-forming liquid  APPLY BY TOPICAL ROUTE ONCE DAILY ;COVER ENTIRE WART SURFACE WITH 1 application at night, Disp: , Rfl:     sitaGLIPtin (JANUVIA) 100 mg tablet, Take 1 tablet (100 mg total) by mouth daily, Disp: 30 tablet, Rfl: 5    Current Allergies     Allergies as of 02/03/2020    (No Known Allergies)            The following portions of the patient's history were reviewed and updated as appropriate: allergies, current medications, past family history, past medical history, past social history, past surgical history and problem list      Past Medical History:   Diagnosis Date    Adnexal cyst     left    History of atrial fibrillation     ablation done, takes metoprolol    Irregular periods     Varicella        Past Surgical History:   Procedure Laterality Date    CARDIAC ELECTROPHYSIOLOGY STUDY AND ABLATION      CATARACT EXTRACTION      EYE SURGERY      OTHER SURGICAL HISTORY      catheter ablation atrial fibrillation    OVARIAN CYST SURGERY Right 2/2/2018    Procedure: LAPAROSCOPIC PARAOVARIAN  CYSTECTOMY;  Surgeon: Farrukh Song MD;  Location:  MAIN OR;  Service: Gynecology       Family History   Problem Relation Age of Onset    Heart disease Father     Hypertension Maternal Grandmother     Heart disease Family     Cancer Family     Thyroid disease Family     Substance Abuse Neg Hx     Mental illness Neg Hx          Medications have been verified  Objective   /80   Pulse 88   Ht 5' 2" (1 575 m)   Wt 84 4 kg (186 lb)   SpO2 98%   BMI 34 02 kg/m²        Physical Exam     Physical Exam   Constitutional: She is oriented to person, place, and time  She appears well-developed and well-nourished  No distress  HENT:   Head: Normocephalic and atraumatic  Right Ear: Tympanic membrane and external ear normal  No drainage  Left Ear: Tympanic membrane normal  No drainage  Mouth/Throat: No oropharyngeal exudate  Eyes: Conjunctivae and EOM are normal  Right eye exhibits no discharge  Left eye exhibits no discharge  Neck: Normal range of motion  Neck supple  No thyromegaly present  Cardiovascular: Normal rate, regular rhythm and normal heart sounds  Pulmonary/Chest: Effort normal  No respiratory distress  She has no wheezes  She has no rales  Abdominal: Soft  Lymphadenopathy:     She has no cervical adenopathy  Neurological: She is alert and oriented to person, place, and time  Psychiatric: She has a normal mood and affect   Her behavior is normal

## 2020-02-27 DIAGNOSIS — E78.1 HYPERTRIGLYCERIDEMIA: ICD-10-CM

## 2020-02-27 RX ORDER — FENOFIBRATE 145 MG/1
145 TABLET, COATED ORAL DAILY
Qty: 30 TABLET | Refills: 5 | Status: CANCELLED | OUTPATIENT
Start: 2020-02-27

## 2020-03-05 ENCOUNTER — HOSPITAL ENCOUNTER (OUTPATIENT)
Dept: RADIOLOGY | Facility: HOSPITAL | Age: 40
Discharge: HOME | End: 2020-03-05
Payer: COMMERCIAL

## 2020-03-05 ENCOUNTER — TRANSCRIBE ORDERS (OUTPATIENT)
Dept: ADMINISTRATIVE | Facility: HOSPITAL | Age: 40
End: 2020-03-05

## 2020-03-05 DIAGNOSIS — M77.32 CALCANEAL SPUR OF LEFT FOOT: Primary | ICD-10-CM

## 2020-03-05 DIAGNOSIS — M77.32 CALCANEAL SPUR OF LEFT FOOT: ICD-10-CM

## 2020-03-05 PROCEDURE — 73630 X-RAY EXAM OF FOOT: CPT

## 2020-03-19 DIAGNOSIS — E11.9 TYPE 2 DIABETES MELLITUS WITHOUT COMPLICATION, WITHOUT LONG-TERM CURRENT USE OF INSULIN (HCC): ICD-10-CM

## 2020-03-19 DIAGNOSIS — E78.1 HYPERTRIGLYCERIDEMIA: ICD-10-CM

## 2020-03-19 RX ORDER — FENOFIBRATE 145 MG/1
145 TABLET, COATED ORAL DAILY
Qty: 30 TABLET | Refills: 5 | Status: SHIPPED | OUTPATIENT
Start: 2020-03-19 | End: 2020-09-08

## 2020-05-19 ENCOUNTER — TELEPHONE (OUTPATIENT)
Dept: FAMILY MEDICINE CLINIC | Facility: CLINIC | Age: 40
End: 2020-05-19

## 2020-05-19 DIAGNOSIS — E11.9 TYPE 2 DIABETES MELLITUS WITHOUT COMPLICATION, WITHOUT LONG-TERM CURRENT USE OF INSULIN (HCC): Primary | ICD-10-CM

## 2020-05-20 DIAGNOSIS — E11.9 TYPE 2 DIABETES MELLITUS WITHOUT COMPLICATION, WITHOUT LONG-TERM CURRENT USE OF INSULIN (HCC): ICD-10-CM

## 2020-05-20 RX ORDER — LANCETS
EACH MISCELLANEOUS
Qty: 100 EACH | Refills: 1 | Status: SHIPPED | OUTPATIENT
Start: 2020-05-20 | End: 2020-11-30

## 2020-05-21 ENCOUNTER — TELEPHONE (OUTPATIENT)
Dept: FAMILY MEDICINE CLINIC | Facility: CLINIC | Age: 40
End: 2020-05-21

## 2020-05-21 LAB
ALBUMIN SERPL-MCNC: 4.4 G/DL (ref 3.8–4.8)
ALBUMIN/GLOB SERPL: 2.1 {RATIO} (ref 1.2–2.2)
ALP SERPL-CCNC: 62 IU/L (ref 39–117)
ALT SERPL-CCNC: 103 IU/L (ref 0–32)
AST SERPL-CCNC: 58 IU/L (ref 0–40)
BILIRUB SERPL-MCNC: 0.5 MG/DL (ref 0–1.2)
BUN SERPL-MCNC: 17 MG/DL (ref 6–20)
BUN/CREAT SERPL: 21 (ref 9–23)
CALCIUM SERPL-MCNC: 9.5 MG/DL (ref 8.7–10.2)
CHLORIDE SERPL-SCNC: 103 MMOL/L (ref 96–106)
CHOLEST SERPL-MCNC: 180 MG/DL (ref 100–199)
CO2 SERPL-SCNC: 24 MMOL/L (ref 20–29)
CREAT SERPL-MCNC: 0.81 MG/DL (ref 0.57–1)
EST. AVERAGE GLUCOSE BLD GHB EST-MCNC: 160 MG/DL
GLOBULIN SER-MCNC: 2.1 G/DL (ref 1.5–4.5)
GLUCOSE SERPL-MCNC: 181 MG/DL (ref 65–99)
HBA1C MFR BLD: 7.2 % (ref 4.8–5.6)
HDLC SERPL-MCNC: 31 MG/DL
LDLC SERPL CALC-MCNC: ABNORMAL MG/DL (ref 0–99)
LDLC SERPL DIRECT ASSAY-MCNC: 87 MG/DL (ref 0–99)
LDLC/HDLC SERPL: ABNORMAL RATIO
POTASSIUM SERPL-SCNC: 4.2 MMOL/L (ref 3.5–5.2)
PROT SERPL-MCNC: 6.5 G/DL (ref 6–8.5)
SL AMB EGFR AFRICAN AMERICAN: 107 ML/MIN/1.73
SL AMB EGFR NON AFRICAN AMERICAN: 92 ML/MIN/1.73
SL AMB VLDL CHOLESTEROL CALC: ABNORMAL MG/DL (ref 5–40)
SODIUM SERPL-SCNC: 141 MMOL/L (ref 134–144)
TRIGL SERPL-MCNC: 480 MG/DL (ref 0–149)

## 2020-05-21 PROCEDURE — 3051F HG A1C>EQUAL 7.0%<8.0%: CPT | Performed by: FAMILY MEDICINE

## 2020-05-21 NOTE — RESULT ENCOUNTER NOTE
Call patient with lab result-sugars are under improved  Can set up a virtual visit next week  See if she can get blood pressure and pulse and weight prior to the visit

## 2020-06-01 ENCOUNTER — OFFICE VISIT (OUTPATIENT)
Dept: FAMILY MEDICINE CLINIC | Facility: CLINIC | Age: 40
End: 2020-06-01
Payer: COMMERCIAL

## 2020-06-01 VITALS
DIASTOLIC BLOOD PRESSURE: 80 MMHG | BODY MASS INDEX: 34.52 KG/M2 | SYSTOLIC BLOOD PRESSURE: 120 MMHG | OXYGEN SATURATION: 93 % | RESPIRATION RATE: 26 BRPM | HEART RATE: 73 BPM | HEIGHT: 62 IN | TEMPERATURE: 97.2 F | WEIGHT: 187.6 LBS

## 2020-06-01 DIAGNOSIS — I48.0 PAROXYSMAL ATRIAL FIBRILLATION (HCC): ICD-10-CM

## 2020-06-01 DIAGNOSIS — R79.89 ELEVATED LFTS: ICD-10-CM

## 2020-06-01 DIAGNOSIS — E11.9 TYPE 2 DIABETES MELLITUS WITHOUT COMPLICATION, WITHOUT LONG-TERM CURRENT USE OF INSULIN (HCC): Primary | ICD-10-CM

## 2020-06-01 DIAGNOSIS — E78.1 HYPERTRIGLYCERIDEMIA: ICD-10-CM

## 2020-06-01 PROCEDURE — 2022F DILAT RTA XM EVC RTNOPTHY: CPT | Performed by: FAMILY MEDICINE

## 2020-06-01 PROCEDURE — 99214 OFFICE O/P EST MOD 30 MIN: CPT | Performed by: FAMILY MEDICINE

## 2020-06-01 PROCEDURE — 1036F TOBACCO NON-USER: CPT | Performed by: FAMILY MEDICINE

## 2020-06-01 PROCEDURE — 3008F BODY MASS INDEX DOCD: CPT | Performed by: FAMILY MEDICINE

## 2020-06-01 RX ORDER — ATORVASTATIN CALCIUM 10 MG/1
10 TABLET, FILM COATED ORAL DAILY
Qty: 30 TABLET | Refills: 5 | Status: SHIPPED | OUTPATIENT
Start: 2020-06-01 | End: 2020-12-09

## 2020-06-15 DIAGNOSIS — E11.9 TYPE 2 DIABETES MELLITUS WITHOUT COMPLICATION, WITHOUT LONG-TERM CURRENT USE OF INSULIN (HCC): ICD-10-CM

## 2020-06-15 RX ORDER — BLOOD-GLUCOSE METER
EACH MISCELLANEOUS
Qty: 1 EACH | Refills: 0 | Status: SHIPPED | OUTPATIENT
Start: 2020-06-15

## 2020-08-07 DIAGNOSIS — E11.9 TYPE 2 DIABETES MELLITUS WITHOUT COMPLICATION, WITHOUT LONG-TERM CURRENT USE OF INSULIN (HCC): ICD-10-CM

## 2020-08-07 RX ORDER — SITAGLIPTIN 100 MG/1
TABLET, FILM COATED ORAL
Qty: 30 TABLET | Refills: 2 | Status: SHIPPED | OUTPATIENT
Start: 2020-08-07 | End: 2020-11-10

## 2020-08-13 ENCOUNTER — TELEPHONE (OUTPATIENT)
Dept: FAMILY MEDICINE CLINIC | Facility: CLINIC | Age: 40
End: 2020-08-13

## 2020-08-13 LAB
ALBUMIN SERPL-MCNC: 3.9 G/DL (ref 3.8–4.8)
ALP SERPL-CCNC: 94 IU/L (ref 39–117)
ALT SERPL-CCNC: 108 IU/L (ref 0–32)
AST SERPL-CCNC: 77 IU/L (ref 0–40)
BILIRUB DIRECT SERPL-MCNC: 0.3 MG/DL (ref 0–0.4)
BILIRUB SERPL-MCNC: 0.6 MG/DL (ref 0–1.2)
HCV AB S/CO SERPL IA: <0.1 S/CO RATIO (ref 0–0.9)
PROT SERPL-MCNC: 6.1 G/DL (ref 6–8.5)

## 2020-08-13 NOTE — TELEPHONE ENCOUNTER
After notifying Dr Drea Lloyd that patient had not called back for her results, she called back  I advised of lab results as per Dr Drea Lloyd, she reports she does NOT have any symptoms at all  She was also not aware of an appointment next week, but, I advised her to keep the appointment with Dr Drea Lloyd and she agreed to do so       ----- Message from Lili Barajas MD sent at 8/13/2020 12:29 PM EDT -----  Call patient with lab result-liver enzymes still elevated  Hepatitis C negative  Verify patient have any symptoms  If having nausea, abdominal pain, or other GI symptoms would have her check an ultrasound of the liver and gallbladder prior to my visit in September      Appointment 9/1/2020 @ [phone]) with Dr Drae Lloyd

## 2020-08-16 DIAGNOSIS — E11.9 TYPE 2 DIABETES MELLITUS WITHOUT COMPLICATION, WITHOUT LONG-TERM CURRENT USE OF INSULIN (HCC): Primary | ICD-10-CM

## 2020-08-17 RX ORDER — BLOOD SUGAR DIAGNOSTIC
STRIP MISCELLANEOUS
Qty: 100 EACH | Refills: 3 | Status: SHIPPED | OUTPATIENT
Start: 2020-08-17

## 2020-08-27 ENCOUNTER — TELEPHONE (OUTPATIENT)
Dept: FAMILY MEDICINE CLINIC | Facility: CLINIC | Age: 40
End: 2020-08-27

## 2020-09-01 ENCOUNTER — OFFICE VISIT (OUTPATIENT)
Dept: FAMILY MEDICINE CLINIC | Facility: CLINIC | Age: 40
End: 2020-09-01
Payer: COMMERCIAL

## 2020-09-01 VITALS
HEIGHT: 61 IN | TEMPERATURE: 98.1 F | HEART RATE: 87 BPM | RESPIRATION RATE: 16 BRPM | OXYGEN SATURATION: 97 % | BODY MASS INDEX: 35.68 KG/M2 | DIASTOLIC BLOOD PRESSURE: 70 MMHG | WEIGHT: 189 LBS | SYSTOLIC BLOOD PRESSURE: 118 MMHG

## 2020-09-01 DIAGNOSIS — Z23 NEED FOR INFLUENZA VACCINATION: ICD-10-CM

## 2020-09-01 DIAGNOSIS — E11.69 HYPERLIPIDEMIA ASSOCIATED WITH TYPE 2 DIABETES MELLITUS (HCC): ICD-10-CM

## 2020-09-01 DIAGNOSIS — E78.5 HYPERLIPIDEMIA ASSOCIATED WITH TYPE 2 DIABETES MELLITUS (HCC): ICD-10-CM

## 2020-09-01 DIAGNOSIS — I48.0 PAROXYSMAL ATRIAL FIBRILLATION (HCC): ICD-10-CM

## 2020-09-01 DIAGNOSIS — E11.9 TYPE 2 DIABETES MELLITUS WITHOUT COMPLICATION, WITHOUT LONG-TERM CURRENT USE OF INSULIN (HCC): Primary | ICD-10-CM

## 2020-09-01 DIAGNOSIS — E78.1 HYPERTRIGLYCERIDEMIA: ICD-10-CM

## 2020-09-01 DIAGNOSIS — R79.89 ELEVATED LFTS: ICD-10-CM

## 2020-09-01 LAB — SL AMB POCT HEMOGLOBIN AIC: 9.2 (ref ?–6.5)

## 2020-09-01 PROCEDURE — 90471 IMMUNIZATION ADMIN: CPT

## 2020-09-01 PROCEDURE — 1036F TOBACCO NON-USER: CPT | Performed by: FAMILY MEDICINE

## 2020-09-01 PROCEDURE — 83036 HEMOGLOBIN GLYCOSYLATED A1C: CPT | Performed by: FAMILY MEDICINE

## 2020-09-01 PROCEDURE — 3046F HEMOGLOBIN A1C LEVEL >9.0%: CPT | Performed by: FAMILY MEDICINE

## 2020-09-01 PROCEDURE — 90686 IIV4 VACC NO PRSV 0.5 ML IM: CPT

## 2020-09-01 PROCEDURE — 99214 OFFICE O/P EST MOD 30 MIN: CPT | Performed by: FAMILY MEDICINE

## 2020-09-01 NOTE — PATIENT INSTRUCTIONS
A1c was in the office today is  9 2%  Will continue current medications  Plan to repeat CMP, lipid panel, and A1c in 3 months with follow-up after  Continue work on diet, exercise, weight loss  Flu shot today

## 2020-09-01 NOTE — PROGRESS NOTES
[id number]         NAME: Lu Bhandari is a 45 y o  female  : 1980    MRN: [id number]  DATE: 2020  TIME: 4:25 PM    Assessment and Plan   Type 2 diabetes mellitus without complication, without long-term current use of insulin ([facility] ) [E11 9]  1  Type 2 diabetes mellitus without complication, without long-term current use of insulin (HCC)  POCT hemoglobin A1c    Hemoglobin A1C    Comprehensive metabolic panel    Lipid Panel with Direct LDL reflex    TSH, 3rd generation    Hemoglobin A1C    Comprehensive metabolic panel    Lipid Panel with Direct LDL reflex    TSH, 3rd generation   2  Hyperlipidemia associated with type 2 diabetes mellitus (HCC)  Comprehensive metabolic panel    Lipid Panel with Direct LDL reflex    Comprehensive metabolic panel    Lipid Panel with Direct LDL reflex   3  Paroxysmal atrial fibrillation (HCC)     4  Elevated LFTs  Comprehensive metabolic panel    Comprehensive metabolic panel   5  Need for influenza vaccination  influenza vaccine, quadrivalent, 0 5 mL, preservative-free, for adult and pediatric patients 6 mos+ (AFLURIA, FLUARIX, FLULAVAL, FLUZONE)    CANCELED: influenza vaccine, quadrivalent, recombinant, PF, 0 5 mL, for patients 18 yr+ (FLUBLOK)   6  Hypertriglyceridemia         No problem-specific Assessment & Plan notes found for this encounter  Patient Instructions     Patient Instructions   A1c was in the office today is  9 2%  Will continue current medications  Plan to repeat CMP, lipid panel, and A1c in 3 months with follow-up after  Continue work on diet, exercise, weight loss  Flu shot today  Chief Complaint     Chief Complaint   Patient presents with    Follow-up     3 month check up         History of Present Illness       Medical management-  1) diabetes-last A1c was down to 7 2  Patient states sugars have been running a little higher since that time    2) hyperlipidemia with elevated triglycerides-patient tolerating current medication  3) elevated liver enzymes  Hepatitis C antibody is negative  Presume fatty liver  4) atrial fibrillation paroxysmal-no recent episodes  Review of Systems   Review of Systems   Constitutional: Negative for activity change, appetite change, diaphoresis and fatigue  Respiratory: Negative for cough, chest tightness, shortness of breath and wheezing  Cardiovascular: Negative for chest pain, palpitations and leg swelling  Fast or slow heart rate   Gastrointestinal: Negative for abdominal pain, blood in stool, constipation, diarrhea, nausea and vomiting  Genitourinary: Negative for difficulty urinating, dysuria and frequency  Musculoskeletal: Negative for arthralgias, gait problem, joint swelling and myalgias  Neurological: Negative for dizziness, light-headedness and headaches  Psychiatric/Behavioral: Positive for sleep disturbance  Negative for agitation, confusion and dysphoric mood  The patient is not nervous/anxious            Current Medications       Current Outpatient Medications:     aspirin 81 MG tablet, Take 81 mg by mouth daily, Disp: , Rfl:     atorvastatin (LIPITOR) 10 mg tablet, Take 1 tablet (10 mg total) by mouth daily, Disp: 30 tablet, Rfl: 5    Blood Glucose Monitoring Suppl (ONE TOUCH ULTRA 2) w/Device KIT, USE TO TEST BLOOD SUGAR, Disp: 1 each, Rfl: 0    fenofibrate (TRICOR) 145 mg tablet, Take 1 tablet (145 mg total) by mouth daily, Disp: 30 tablet, Rfl: 5    flecainide (TAMBOCOR) 100 mg tablet, , Disp: , Rfl:     glucose blood (OneTouch Ultra) test strip, Test once daily, Disp: 100 each, Rfl: 3    Januvia 100 MG tablet, TAKE 1 TABLET BY MOUTH EVERY DAY, Disp: 30 tablet, Rfl: 2    Lancets (ONETOUCH ULTRASOFT) lancets, Test 1-2 time daily, Disp: 100 each, Rfl: 1    metFORMIN (GLUCOPHAGE) 1000 MG tablet, Take 1 tablet (1,000 mg total) by mouth 2 (two) times a day with meals, Disp: 60 tablet, Rfl: 5    metoprolol succinate (TOPROL-XL) 25 mg 24 hr tablet, Take 25 mg by mouth daily, Disp: , Rfl:     Current Allergies     Allergies as of 09/01/2020    (No Known Allergies)            The following portions of the patient's history were reviewed and updated as appropriate: allergies, current medications, past family history, past medical history, past social history, past surgical history and problem list      Past Medical History:   Diagnosis Date    Adnexal cyst     left    History of atrial fibrillation     ablation done, takes metoprolol    Irregular periods     Varicella        Past Surgical History:   Procedure Laterality Date    CARDIAC ELECTROPHYSIOLOGY STUDY AND ABLATION      CATARACT EXTRACTION      EYE SURGERY      OTHER SURGICAL HISTORY      catheter ablation atrial fibrillation    OVARIAN CYST SURGERY Right 2/2/2018    Procedure: LAPAROSCOPIC PARAOVARIAN  CYSTECTOMY;  Surgeon: Dyllan Muñoz MD;  Location:  MAIN OR;  Service: Gynecology       Family History   Problem Relation Age of Onset    Heart disease Father     Hypertension Maternal Grandmother     Heart disease Family     Cancer Family     Thyroid disease Family     Substance Abuse Neg Hx     Mental illness Neg Hx          Medications have been verified  Objective   /70   Pulse 87   Temp 98 1 °F (36 7 °C)   Resp 16   Ht 5' 1" (1 549 m)   Wt 85 7 kg (189 lb)   LMP 06/14/2020 (Exact Date)   SpO2 97%   BMI 35 71 kg/m²        Physical Exam     Physical Exam  Vitals signs reviewed  Constitutional:       General: She is not in acute distress  Appearance: She is well-developed  She is not diaphoretic  HENT:      Head: Normocephalic and atraumatic  Right Ear: Tympanic membrane, ear canal and external ear normal       Left Ear: Tympanic membrane, ear canal and external ear normal       Nose: No mucosal edema or rhinorrhea  Right Sinus: No maxillary sinus tenderness  Left Sinus: No maxillary sinus tenderness  Mouth/Throat:      Mouth: Mucous membranes are not pale and not dry  Dentition: Normal dentition  Pharynx: Uvula midline  No oropharyngeal exudate  Eyes:      General:         Right eye: No discharge  Left eye: No discharge  Pupils: Pupils are equal, round, and reactive to light  Neck:      Musculoskeletal: Normal range of motion and neck supple  Thyroid: No thyromegaly  Cardiovascular:      Rate and Rhythm: Normal rate and regular rhythm  Heart sounds: Normal heart sounds  No murmur  Pulmonary:      Effort: Pulmonary effort is normal  No respiratory distress  Breath sounds: Normal breath sounds  No wheezing or rales  Musculoskeletal: Normal range of motion  General: No tenderness  Lymphadenopathy:      Cervical: No cervical adenopathy  Neurological:      Mental Status: She is alert and oriented to person, place, and time  Cranial Nerves: No cranial nerve deficit     Psychiatric:         Mood and Affect: Mood normal          Behavior: Behavior normal

## 2020-09-07 DIAGNOSIS — E11.9 TYPE 2 DIABETES MELLITUS WITHOUT COMPLICATION, WITHOUT LONG-TERM CURRENT USE OF INSULIN (HCC): ICD-10-CM

## 2020-09-07 DIAGNOSIS — E78.1 HYPERTRIGLYCERIDEMIA: ICD-10-CM

## 2020-09-08 RX ORDER — FENOFIBRATE 145 MG/1
TABLET, COATED ORAL
Qty: 30 TABLET | Refills: 3 | Status: SHIPPED | OUTPATIENT
Start: 2020-09-08 | End: 2020-12-21 | Stop reason: ALTCHOICE

## 2020-11-10 DIAGNOSIS — E11.9 TYPE 2 DIABETES MELLITUS WITHOUT COMPLICATION, WITHOUT LONG-TERM CURRENT USE OF INSULIN (HCC): ICD-10-CM

## 2020-11-10 RX ORDER — SITAGLIPTIN 100 MG/1
TABLET, FILM COATED ORAL
Qty: 30 TABLET | Refills: 2 | Status: SHIPPED | OUTPATIENT
Start: 2020-11-10 | End: 2021-02-10

## 2020-11-30 DIAGNOSIS — E11.9 TYPE 2 DIABETES MELLITUS WITHOUT COMPLICATION, WITHOUT LONG-TERM CURRENT USE OF INSULIN (HCC): ICD-10-CM

## 2020-11-30 RX ORDER — LANCETS 30 GAUGE
EACH MISCELLANEOUS
Qty: 100 EACH | Refills: 3 | Status: SHIPPED | OUTPATIENT
Start: 2020-11-30

## 2020-12-08 DIAGNOSIS — E11.9 TYPE 2 DIABETES MELLITUS WITHOUT COMPLICATION, WITHOUT LONG-TERM CURRENT USE OF INSULIN (HCC): ICD-10-CM

## 2020-12-09 RX ORDER — ATORVASTATIN CALCIUM 10 MG/1
TABLET, FILM COATED ORAL
Qty: 30 TABLET | Refills: 5 | Status: SHIPPED | OUTPATIENT
Start: 2020-12-09 | End: 2021-05-25

## 2020-12-12 LAB
ALBUMIN SERPL-MCNC: 4.1 G/DL (ref 3.8–4.8)
ALBUMIN/GLOB SERPL: 1.9 {RATIO} (ref 1.2–2.2)
ALP SERPL-CCNC: 117 IU/L (ref 39–117)
ALT SERPL-CCNC: 46 IU/L (ref 0–32)
AST SERPL-CCNC: 41 IU/L (ref 0–40)
BILIRUB SERPL-MCNC: 1.5 MG/DL (ref 0–1.2)
BUN SERPL-MCNC: 14 MG/DL (ref 6–20)
BUN/CREAT SERPL: 18 (ref 9–23)
CALCIUM SERPL-MCNC: 9.3 MG/DL (ref 8.7–10.2)
CHLORIDE SERPL-SCNC: 103 MMOL/L (ref 96–106)
CHOLEST SERPL-MCNC: 134 MG/DL (ref 100–199)
CO2 SERPL-SCNC: 26 MMOL/L (ref 20–29)
CREAT SERPL-MCNC: 0.8 MG/DL (ref 0.57–1)
EST. AVERAGE GLUCOSE BLD GHB EST-MCNC: 151 MG/DL
GLOBULIN SER-MCNC: 2.2 G/DL (ref 1.5–4.5)
GLUCOSE SERPL-MCNC: 169 MG/DL (ref 65–99)
HBA1C MFR BLD: 6.9 % (ref 4.8–5.6)
HDLC SERPL-MCNC: 30 MG/DL
LDLC SERPL CALC-MCNC: 59 MG/DL (ref 0–99)
LDLC/HDLC SERPL: 2 RATIO (ref 0–3.2)
POTASSIUM SERPL-SCNC: 4.1 MMOL/L (ref 3.5–5.2)
PROT SERPL-MCNC: 6.3 G/DL (ref 6–8.5)
SL AMB EGFR AFRICAN AMERICAN: 107 ML/MIN/1.73
SL AMB EGFR NON AFRICAN AMERICAN: 93 ML/MIN/1.73
SL AMB VLDL CHOLESTEROL CALC: 45 MG/DL (ref 5–40)
SODIUM SERPL-SCNC: 143 MMOL/L (ref 134–144)
TRIGL SERPL-MCNC: 285 MG/DL (ref 0–149)
TSH SERPL DL<=0.005 MIU/L-ACNC: 2.02 UIU/ML (ref 0.45–4.5)

## 2020-12-12 NOTE — RESULT ENCOUNTER NOTE
Call patient with lab result-patient let her know sugars are much improved  Will discuss at office visit  Just want to let her know she was doing much better with sugars

## 2020-12-14 ENCOUNTER — TELEPHONE (OUTPATIENT)
Dept: FAMILY MEDICINE CLINIC | Facility: CLINIC | Age: 40
End: 2020-12-14

## 2020-12-21 ENCOUNTER — OFFICE VISIT (OUTPATIENT)
Dept: FAMILY MEDICINE CLINIC | Facility: CLINIC | Age: 40
End: 2020-12-21
Payer: COMMERCIAL

## 2020-12-21 VITALS
BODY MASS INDEX: 34.36 KG/M2 | HEIGHT: 61 IN | HEART RATE: 52 BPM | OXYGEN SATURATION: 94 % | SYSTOLIC BLOOD PRESSURE: 116 MMHG | TEMPERATURE: 97.6 F | WEIGHT: 182 LBS | DIASTOLIC BLOOD PRESSURE: 72 MMHG

## 2020-12-21 DIAGNOSIS — E11.69 HYPERLIPIDEMIA ASSOCIATED WITH TYPE 2 DIABETES MELLITUS (HCC): ICD-10-CM

## 2020-12-21 DIAGNOSIS — Z00.00 ANNUAL PHYSICAL EXAM: Primary | ICD-10-CM

## 2020-12-21 DIAGNOSIS — I48.0 PAROXYSMAL ATRIAL FIBRILLATION (HCC): ICD-10-CM

## 2020-12-21 DIAGNOSIS — E78.5 HYPERLIPIDEMIA ASSOCIATED WITH TYPE 2 DIABETES MELLITUS (HCC): ICD-10-CM

## 2020-12-21 DIAGNOSIS — E11.9 TYPE 2 DIABETES MELLITUS WITHOUT COMPLICATION, WITHOUT LONG-TERM CURRENT USE OF INSULIN (HCC): ICD-10-CM

## 2020-12-21 DIAGNOSIS — R79.89 ELEVATED LFTS: ICD-10-CM

## 2020-12-21 PROCEDURE — 3008F BODY MASS INDEX DOCD: CPT | Performed by: FAMILY MEDICINE

## 2020-12-21 PROCEDURE — 1036F TOBACCO NON-USER: CPT | Performed by: FAMILY MEDICINE

## 2020-12-21 PROCEDURE — 99395 PREV VISIT EST AGE 18-39: CPT | Performed by: FAMILY MEDICINE

## 2020-12-21 PROCEDURE — 99214 OFFICE O/P EST MOD 30 MIN: CPT | Performed by: FAMILY MEDICINE

## 2020-12-22 LAB
ALBUMIN/CREAT UR: 5 MG/G CREAT (ref 0–29)
CREAT UR-MCNC: 136.7 MG/DL
MICROALBUMIN UR-MCNC: 6.9 UG/ML

## 2020-12-22 PROCEDURE — 3061F NEG MICROALBUMINURIA REV: CPT | Performed by: FAMILY MEDICINE

## 2021-01-25 LAB
LEFT EYE DIABETIC RETINOPATHY: NORMAL
RIGHT EYE DIABETIC RETINOPATHY: NORMAL

## 2021-02-10 DIAGNOSIS — E11.9 TYPE 2 DIABETES MELLITUS WITHOUT COMPLICATION, WITHOUT LONG-TERM CURRENT USE OF INSULIN (HCC): ICD-10-CM

## 2021-02-10 RX ORDER — SITAGLIPTIN 100 MG/1
TABLET, FILM COATED ORAL
Qty: 30 TABLET | Refills: 2 | Status: SHIPPED | OUTPATIENT
Start: 2021-02-10 | End: 2021-05-19

## 2021-03-10 ENCOUNTER — OFFICE VISIT (OUTPATIENT)
Dept: DERMATOLOGY | Facility: CLINIC | Age: 41
End: 2021-03-10
Payer: COMMERCIAL

## 2021-03-10 VITALS — TEMPERATURE: 97.7 F | WEIGHT: 180.8 LBS | HEIGHT: 62 IN | BODY MASS INDEX: 33.27 KG/M2

## 2021-03-10 DIAGNOSIS — D48.5 NEOPLASM OF UNCERTAIN BEHAVIOR OF SKIN: Primary | ICD-10-CM

## 2021-03-10 PROCEDURE — 1036F TOBACCO NON-USER: CPT | Performed by: STUDENT IN AN ORGANIZED HEALTH CARE EDUCATION/TRAINING PROGRAM

## 2021-03-10 PROCEDURE — 88305 TISSUE EXAM BY PATHOLOGIST: CPT | Performed by: PATHOLOGY

## 2021-03-10 PROCEDURE — 88342 IMHCHEM/IMCYTCHM 1ST ANTB: CPT | Performed by: PATHOLOGY

## 2021-03-10 PROCEDURE — 11102 TANGNTL BX SKIN SINGLE LES: CPT | Performed by: STUDENT IN AN ORGANIZED HEALTH CARE EDUCATION/TRAINING PROGRAM

## 2021-03-10 PROCEDURE — 88341 IMHCHEM/IMCYTCHM EA ADD ANTB: CPT | Performed by: PATHOLOGY

## 2021-03-10 PROCEDURE — 99204 OFFICE O/P NEW MOD 45 MIN: CPT | Performed by: STUDENT IN AN ORGANIZED HEALTH CARE EDUCATION/TRAINING PROGRAM

## 2021-03-10 PROCEDURE — 88344 IMHCHEM/IMCYTCHM EA MLT ANTB: CPT | Performed by: PATHOLOGY

## 2021-03-10 NOTE — PATIENT INSTRUCTIONS
NEOPLASM OF UNCERTAIN BEHAVIOR OF SKIN    INFORMED CONSENT DISCUSSION AND POST-OPERATIVE INSTRUCTIONS FOR PATIENT    I   RATIONALE FOR PROCEDURE  I understand that a skin biopsy allows the Dermatologist to test a lesion or rash under the microscope to obtain a diagnosis  It usually involves numbing the area with numbing medication and removing a small piece of skin; sometimes the area will be closed with sutures  In this specific procedure, sutures are not usually needed  If any sutures are placed, then they are usually need to be removed in 2 weeks or less  I understand that my Dermatologist recommends that a skin "shave" biopsy be performed today  A local anesthetic, similar to the kind that a dentist uses when filling a cavity, will be injected with a very small needle into the skin area to be sampled  The injected skin and tissue underneath "will go to sleep and become numb so no pain should be felt afterwards  An instrument shaped like a tiny "razor blade" (shave biopsy instrument) will be used to cut a small piece of tissue and skin from the area so that a sample of tissue can be taken and examined more closely under the microscope  A slight amount of bleeding will occur, but it will be stopped with direct pressure and a pressure bandage and any other appropriate methods  I understands that a scar will form where the wound was created  Surgical ointment will be applied to help protect the wound  Sutures are not usually needed      II   RISKS AND POTENTIAL COMPLICATIONS   I understand the risks and potential complications of a skin biopsy include but are not limited to the following:   Bleeding   Infection   Pain   Scar/keloid   Skin discoloration   Incomplete Removal   Recurrence   Nerve Damage/Numbness/Loss of Function   Allergic Reaction to Anesthesia   Biopsies are diagnostic procedures and based on findings additional treatment or evaluation may be required   Loss or destruction of specimen resulting in no additional findings    My Dermatologist has explained to me the nature of the condition, the nature of the procedure, and the benefits to be reasonably expected compared with alternative approaches  My Dermatologist has discussed the likelihood of major risks or complications of this procedure including the specific risks listed above, such as bleeding, infection, and scarring/keloid  I understand that a scar is expected after this procedure  I understand that my physician cannot predict if the scar will form a "keloid," which extends beyond the borders of the wound that is created  A keloid is a thick, painful, and bumpy scar  A keloid can be difficult to treat, as it does not always respond well to therapy, which includes injecting cortisone directly into the keloid every few weeks  While this usually reduces the pain and size of the scar, it does not eliminate it  I understand that photographs may be taken before and after the procedure  These will be maintained as part of the medical providers confidential records and may not be made available to me  I further authorize the medical provider to use the photographs for teaching purposes or to illustrate scientific papers, books, or lectures if in his/her judgment, medical research, education, or science may benefit from its use  I have had an opportunity to fully inquire about the risks and benefits of this procedure and its alternatives  I have been given ample time and opportunity to ask questions and to seek a second opinion if I wished to do so  I acknowledge that there have specifically been no guarantees as to the cosmetic results from the procedure  I am aware that with any procedure there is always the possibility of an unexpected complication  III  POST-PROCEDURAL CARE (WHAT YOU WILL NEED TO DO "AFTER THE BIOPSY" TO OPTIMIZE HEALING)     Keep the area clean and dry    Try NOT to remove the bandage or get it wet for the first 24 hours   Gently clean the area and apply surgical ointment (such as Vaseline petrolatum ointment, which is available "over the counter" and not a prescription) to the biopsy site for up to 2 weeks straight  This acts to protect the wound from the outside world   Sutures are not usually placed in this procedure  If any sutures were placed, return for suture removal as instructed (generally 1 week for the face, 2 weeks for the body)   Take Acetaminophen (Tylenol) for discomfort, if no contraindications  Ibuprofen or aspirin could make bleeding worse   Call our office immediately for signs of infection: fever, chills, increased redness, warmth, tenderness, discomfort/pain, or pus or foul smell coming from the wound  WHAT TO DO IF THERE IS ANY BLEEDING? If a small amount of bleeding is noticed, place a clean cloth over the area and apply firm pressure for ten minutes  Check the wound after 10 minutes of direct pressure  If bleeding persists, try one more time for an additional 10 minutes of direct pressure on the area  If the bleeding becomes heavier or does not stop after the second attempt, or if you have any other questions about this procedure, then please call your [facility]'s Dermatologist by calling [phone] ([phone])  I hereby acknowledge that I have reviewed and verified the site with my Dermatologist and have requested and authorized my Dermatologist to proceed with the procedure

## 2021-03-10 NOTE — PROGRESS NOTES
[facility] Note     Patient Name: Sepideh Bui  Encounter Date: 3/10/2021     Have you been cared for by a [facility] D[facility]atologist in the last 3 years and, if so, which one? No    · Have you traveled outside of the [address] [place] [address] in the past 3 months or outside of the [facility] area in the last 2 weeks? No     May we call your Preferred Phone number to discuss your specific medical information? Yes     May we leave a detailed message that includes your specific medical information? Yes      Today's Chief Concerns:   Concern #1:  Mole on scalp for over a year       Past Medical History:  Have you personally ever had or currently have any of the following? · Skin cancer (such as Melanoma, Basal Cell Carcinoma, Squamous Cell Carcinoma? (If Yes, please provide more detail)- No  · Eczema: No  · Psoriasis: No  · HIV/AIDS: No  · Hepatitis B or C: No  · Tuberculosis: No  · Systemic Immunosuppression such as Diabetes, Biologic or Immunotherapy, Chemotherapy, Organ Transplantation, Bone Marrow Transplantation (If YES, please provide more detail): YES, Diabetes type 2   · Radiation Treatment (If YES, please provide more detail): No  · Any other major medical conditions/concerns? (If Yes, which types)- No    Social History:    What is/was your primary occupation? Unemployed    What are your hobbies/past-times? Swimming, reading, go for walks       Family History:  Have any of your "first degree relatives" (parent, brother, sister, or child) had any of the following       · Skin cancer such as Melanoma or Merkel Cell Carcinoma or Pancreatic Cancer? No  · Eczema, Asthma, Hay Fever or Seasonal Allergies: YES, Seasonal allergies ( Mother)   · Psoriasis or Psoriatic Arthritis: No  · Do any other medical conditions seem to run in your family? If Yes, what condition and which relatives?   No    Current Medications:       Current Outpatient Medications:     aspirin 81 MG tablet, Take 81 mg by mouth daily, Disp: , Rfl:     atorvastatin (LIPITOR) 10 mg tablet, TAKE 1 TABLET BY MOUTH EVERY DAY, Disp: 30 tablet, Rfl: 5    Blood Glucose Monitoring Suppl (ONE TOUCH ULTRA 2) w/Device KIT, USE TO TEST BLOOD SUGAR, Disp: 1 each, Rfl: 0    flecainide (TAMBOCOR) 100 mg tablet, , Disp: , Rfl:     glucose blood (OneTouch Ultra) test strip, Test once daily, Disp: 100 each, Rfl: 3    Januvia 100 MG tablet, TAKE 1 TABLET BY MOUTH EVERY DAY, Disp: 30 tablet, Rfl: 2    Lancets (OneTouch Delica Plus UJWHVR38G) MISC, TEST 1-2 TIMES A DAY, Disp: 100 each, Rfl: 3    metoprolol succinate (TOPROL-XL) 25 mg 24 hr tablet, Take 25 mg by mouth daily, Disp: , Rfl:     metFORMIN (GLUCOPHAGE) 500 mg tablet, Take 1 tablet (500 mg total) by mouth 2 (two) times a day with meals, Disp: , Rfl:       Review of Systems:  Have you recently had or currently have any of the following? If YES, what are you doing for the problem? · Fever, chills or unintended weight loss: No  · Sudden loss or change in your vision: No  · Nausea, vomiting or blood in your stool: No  · Painful or swollen joints: No  · Wheezing or cough: No  · Changing mole or non-healing wound: No  · Nosebleeds: No  · Excessive sweating: No  · Easy or prolonged bleeding? No  · Over the last 2 weeks, how often have you been bothered by the following problems? · Taking little interest or pleasure in doing things: 1 - Not at All  · Feeling down, depressed, or hopeless: 1 - Not at All  · Rapid heartbeat with epinephrine:  No    · FEMALES ONLY:    · Are you pregnant or planning to become pregnant? No  · Are you currently or planning to be nursing or breast feeding? No    · Any known allergies?       · No Known Allergies      Physical Exam:     Was a chaperone (Derm Clinical Assistant) present throughout the entire Physical Exam? Yes     Did the Dermatology Team specifically  the patient on the importance of a Full Skin Exam to be sure that nothing is missed clinically? Yes}  o Did the patient ultimately request or accept a Full Skin Exam?  NO      CONSTITUTIONAL:   Vitals:    03/10/21 1003   Temp: 97 7 °F (36 5 °C)   TempSrc: Tympanic   Weight: 82 kg (180 lb 12 8 oz)   Height: 5' 2" (1 575 m)         PSYCH: Normal mood and affect  EYES: Normal conjunctiva  ENT: Normal lips and oral mucosa  CARDIOVASCULAR: No edema  RESPIRATORY: Normal respirations      SKIN:  FOCUSED ORGAN SYSTEM EXAM   Scalp Normal except as noted below in Assessment       Assessment and Plan by Diagnosis:    History of Present Condition:     Duration:  How long has this been an issue for you?    o  For a year    Location Affected:  Where on the body is this affecting you?    o  Scalp    Quality:  Is there any bleeding, pain, itch, burning/irritation, or redness associated with the skin lesion?    o  Denies    Severity:  Describe any bleeding, pain, itch, burning/irritation, or redness on a scale of 1 to 10 (with 10 being the worst)  o  Denies    Timing:  Does this condition seem to be there pretty constantly or do you notice it more at specific times throughout the day?    o  Denies    Context:  Have you ever noticed that this condition seems to be associated with specific activities you do?    o  Denies    Modifying Factors:    o Anything that seems to make the condition worse?    -  Denies   o What have you tried to do to make the condition better? -  Denies    Associated Signs and Symptoms:  Does this skin lesion seem to be associated with any of the following:  o  Denies      1  NEOPLASM OF UNCERTAIN BEHAVIOR OF SKIN    Physical Exam:   (Anatomic Location); (Size and Morphological Description); (Differential Diagnosis):  o Specimen A: Right scalp; Skin; Shave biopsy; 45year old female with a 0 5 x 0 5 cm skin color papule; Differential Diagnoses: Nevus   Pertinent Positives:   Pertinent Negatives: Additional History of Present Condition:  Present for a year  She noticed it and would like to get it removed  Assessment and Plan: We will take a picture for records  We will call with pathology results in 1-2 weeks  VERBAL AND WRITTEN CONSENT OBTAINED  Discussed risks, wound care   I have discussed with the patient that a sample of skin via a "skin biopsy would be potentially helpful to further make a specific diagnosis under the microscope   Based on a thorough discussion of this condition and the management approach to it (including a comprehensive discussion of the known risks, side effects and potential benefits of treatment), the patient (family) agrees to implement the following specific plan:    o Procedure:  Skin Biopsy  After a thorough discussion of treatment options and risk/benefits/alternatives (including but not limited to local pain, scarring, dyspigmentation, blistering, possible superinfection, and inability to confirm a diagnosis via histopathology), verbal and written consent were obtained and portion of the rash was biopsied for tissue sample  See below for consent that was obtained from patient and subsequent Procedure Note  PROCEDURE SHAVE BIOPSY NOTE:     Performing Physician: Kerrie Lawrence Anatomic Location; Clinical Description with size (cm); Pre-Op Diagnosis:     Specimen A: Right scalp; Skin; Shave biopsy; 45year old female with a 0 5   x 0 5 cm skin color papule; Differential Diagnoses: Nevus   Post-op diagnosis: Same      Local anesthesia: 1% xylocaine with epi       Topical anesthesia: None     Hemostasis: Aluminum chloride       After obtaining informed consent  at which time there was a discussion about the purpose of biopsy  and low risks of infection and bleeding  The area was prepped and draped in the usual fashion  Anesthesia was obtained with 1% lidocaine with epinephrine  A shave biopsy to an appropriate sampling depth was obtained with a sterile blade (such as a 15-blade or DermaBlade)   The resulting wound was covered with surgical ointment and bandaged appropriately  The patient tolerated the procedure well without complications and was without signs of functional compromise  Specimen has been sent for review by Dermatopathology  Standard post-procedure care has been explained and has been included in written form within the patient's copy of Informed Consent  INFORMED CONSENT DISCUSSION AND POST-OPERATIVE INSTRUCTIONS FOR PATIENT    I   RATIONALE FOR PROCEDURE  I understand that a skin biopsy allows the Dermatologist to test a lesion or rash under the microscope to obtain a diagnosis  It usually involves numbing the area with numbing medication and removing a small piece of skin; sometimes the area will be closed with sutures  In this specific procedure, sutures are not usually needed  If any sutures are placed, then they are usually need to be removed in 2 weeks or less  I understand that my Dermatologist recommends that a skin "shave" biopsy be performed today  A local anesthetic, similar to the kind that a dentist uses when filling a cavity, will be injected with a very small needle into the skin area to be sampled  The injected skin and tissue underneath "will go to sleep and become numb so no pain should be felt afterwards  An instrument shaped like a tiny "razor blade" (shave biopsy instrument) will be used to cut a small piece of tissue and skin from the area so that a sample of tissue can be taken and examined more closely under the microscope  A slight amount of bleeding will occur, but it will be stopped with direct pressure and a pressure bandage and any other appropriate methods  I understands that a scar will form where the wound was created  Surgical ointment will be applied to help protect the wound  Sutures are not usually needed      II   RISKS AND POTENTIAL COMPLICATIONS   I understand the risks and potential complications of a skin biopsy include but are not limited to the following:   Bleeding   Infection   Pain   Scar/keloid   Skin discoloration   Incomplete Removal   Recurrence   Nerve Damage/Numbness/Loss of Function   Allergic Reaction to Anesthesia   Biopsies are diagnostic procedures and based on findings additional treatment or evaluation may be required   Loss or destruction of specimen resulting in no additional findings    My Dermatologist has explained to me the nature of the condition, the nature of the procedure, and the benefits to be reasonably expected compared with alternative approaches  My Dermatologist has discussed the likelihood of major risks or complications of this procedure including the specific risks listed above, such as bleeding, infection, and scarring/keloid  I understand that a scar is expected after this procedure  I understand that my physician cannot predict if the scar will form a "keloid," which extends beyond the borders of the wound that is created  A keloid is a thick, painful, and bumpy scar  A keloid can be difficult to treat, as it does not always respond well to therapy, which includes injecting cortisone directly into the keloid every few weeks  While this usually reduces the pain and size of the scar, it does not eliminate it  I understand that photographs may be taken before and after the procedure  These will be maintained as part of the medical providers confidential records and may not be made available to me  I further authorize the medical provider to use the photographs for teaching purposes or to illustrate scientific papers, books, or lectures if in his/her judgment, medical research, education, or science may benefit from its use  I have had an opportunity to fully inquire about the risks and benefits of this procedure and its alternatives  I have been given ample time and opportunity to ask questions and to seek a second opinion if I wished to do so    I acknowledge that there have specifically been no guarantees as to the cosmetic results from the procedure  I am aware that with any procedure there is always the possibility of an unexpected complication  III  POST-PROCEDURAL CARE (WHAT YOU WILL NEED TO DO "AFTER THE BIOPSY" TO OPTIMIZE HEALING)     Keep the area clean and dry  Try NOT to remove the bandage or get it wet for the first 24 hours   Gently clean the area and apply surgical ointment (such as Vaseline petrolatum ointment, which is available "over the counter" and not a prescription) to the biopsy site for up to 2 weeks straight  This acts to protect the wound from the outside world   Sutures are not usually placed in this procedure  If any sutures were placed, return for suture removal as instructed (generally 1 week for the face, 2 weeks for the body)   Take Acetaminophen (Tylenol) for discomfort, if no contraindications  Ibuprofen or aspirin could make bleeding worse   Call our office immediately for signs of infection: fever, chills, increased redness, warmth, tenderness, discomfort/pain, or pus or foul smell coming from the wound  WHAT TO DO IF THERE IS ANY BLEEDING? If a small amount of bleeding is noticed, place a clean cloth over the area and apply firm pressure for ten minutes  Check the wound after 10 minutes of direct pressure  If bleeding persists, try one more time for an additional 10 minutes of direct pressure on the area  If the bleeding becomes heavier or does not stop after the second attempt, or if you have any other questions about this procedure, then please call your [facility]ologist by calling [phone] (SKIN)  I hereby acknowledge that I have reviewed and verified the site with my Dermatologist and have requested and authorized my Dermatologist to proceed with the procedure        Scribe Attestation    I,:  Kim Veliz am acting as a scribe while in the presence of the attending physician :       I,:  Kenney Harris MD personally performed the services described in this documentation    as scribed in my presence :

## 2021-03-18 NOTE — RESULT ENCOUNTER NOTE
Called pt and informed her of results  Recommended conservative excision (2mm margins)  Pt understands  All questions answered  [place]: Can you call patient to schedule her for excision with Dr Ming Sykes or Dr Abdiaziz Henderson? Thank you

## 2021-03-29 ENCOUNTER — PROCEDURE VISIT (OUTPATIENT)
Dept: DERMATOLOGY | Facility: CLINIC | Age: 41
End: 2021-03-29
Payer: COMMERCIAL

## 2021-03-29 VITALS — BODY MASS INDEX: 33.13 KG/M2 | TEMPERATURE: 97.6 F | HEIGHT: 62 IN | WEIGHT: 180 LBS

## 2021-03-29 DIAGNOSIS — D22.4 ATYPICAL NEVUS OF SCALP: Primary | ICD-10-CM

## 2021-03-29 PROCEDURE — 88305 TISSUE EXAM BY PATHOLOGIST: CPT | Performed by: STUDENT IN AN ORGANIZED HEALTH CARE EDUCATION/TRAINING PROGRAM

## 2021-03-29 PROCEDURE — 12032 INTMD RPR S/A/T/EXT 2.6-7.5: CPT | Performed by: STUDENT IN AN ORGANIZED HEALTH CARE EDUCATION/TRAINING PROGRAM

## 2021-03-29 PROCEDURE — 3008F BODY MASS INDEX DOCD: CPT | Performed by: STUDENT IN AN ORGANIZED HEALTH CARE EDUCATION/TRAINING PROGRAM

## 2021-03-29 PROCEDURE — 11421 EXC H-F-NK-SP B9+MARG 0.6-1: CPT | Performed by: STUDENT IN AN ORGANIZED HEALTH CARE EDUCATION/TRAINING PROGRAM

## 2021-03-29 NOTE — PATIENT INSTRUCTIONS
[facility] Surgery After Care    WOUND CARE AFTER SURGERY:    1  Do NOT to remove the pressure bandage for 48 hours  Keep the area clean and dry while this bandage is on  2  After removing the bandage for the first time, gently clean the area with soap and water  If the bandage is difficult to remove, getting the bandage wet in the shower will sometimes help soften the adhesive and allow it to be removed more easily  3  You will now need to cleanse this area daily in the shower with gentle soap  There is no need to scrub the area  Apply plain Vaseline ointment (this is over the counter and not a prescription) to the site followed by a clean appropriately sized bandage to area  Non stick dressing and paper tape (or Hypafix) are recommended for sensitive skin but a bandaid is fine if it covers the area well  4  You will need to continue the above daily wound care until you return for suture removal in 10 days (generally 7 days for the face, 10-14 days off the face)      RESTRICTIONS:     For two DAYS:   - You will need to take it very easy as this time is highest risk for bleeding  Being a "couch potato" during these two days is generally recommended  - For surgeries on the face/neck/scalp: Avoid leaning down to pick things up off the floor as this brings blood up to your head  Instead, squat down to pick things up  For two WEEKS:   - No heavy lifting (anything greater than 10 pounds)   - You can start to do slow, gentle activities such as slow walking but nothing to increase your heart rate and blood pressure too much (such as cardiovascular exercise)  It is important to take it easy as there is still a risk for bleeding and a high risk popping of stitches open during this time  If we did surgery near the eyes (including the nose, forehead, front part of your scalp, cheeks): It is VERY common to get a large amount of swelling around your eyes (puffy eyes)   Although less frequent, this can be enough to swell your eyes shut and can also come along with bruising  This should not hurt and is very expected and normal  It is typically worst at ~ 3 days out from your surgery and dramatically better 1 week post-operatively  MANAGING YOUR PAIN AFTER SURGERY     You can expect to have some pain after surgery  This is normal  The pain is typically worse the first two days after surgery, and quickly begins to get better  The best strategy for controlling your pain after surgery is around the clock pain control  You can take over the counter Acetaminophen (Tylenol) for discomfort, if no contraindications  If you are taking this at the maximum dose, you can alternate this with Motrin (ibuprofen or Advil) as well  Alternating these medications with each other allows you to maximize your pain control  In addition to Tylenol and Motrin, you can use heating pads or ice packs on your incisions to help reduce your pain  How will I alternate your regular strength over-the-counter pain medication? You will take a dose of pain medication every three hours   Start by taking 650 mg of Tylenol (2 pills of 325 mg)   3 hours later take 600 mg of Motrin (3 pills of 200 mg)   3 hours after taking the Motrin take 650 mg of Tylenol   3 hours after that take 600 mg of Motrin  See example - if your first dose of Tylenol is at 12:00 PM     12:00 PM  Tylenol 650 mg (2 pills of 325 mg)    3:00 PM  Motrin 600 mg (3 pills of 200 mg)    6:00 PM  Tylenol 650 mg (2 pills of 325 mg)    9:00 PM  Motrin 600 mg (3 pills of 200 mg)    Continue alternating every 3 hours      Important:   Do not take more than 4000mg of Tylenol or 3200mg of Motrin in a 24-hour period  What if I still have pain? If you have pain that is not controlled with the over-the-counter pain medications (Tylenol and Motrin or Advil), don't hesitate to call our staff using the number provided   We will help make sure you are managing your pain in the best way possible, and if necessary, we can provide a prescription for additional pain medication  CALL OUR OFFICE IMMEDIATELY FOR ANY SIGNS OF INFECTION:    This includes fever, chills, increased redness, warmth, tenderness, severe discomfort/pain, or pus or foul smell coming from the wound    [facility] directly at [phone] (SKIN)    IF BLEEDING IS NOTICED:    Place a clean cloth over the area and apply firm pressure for thirty minutes  Check the wound ONLY after 30 minutes of direct pressure; do not cheat and sneak a peak, as that does not count  If bleeding persists after 30 minutes of legitimate direct pressure, then try one more round of direct pressure to the area  Should the bleeding become heavier or not stop after the second attempt, call [facility] directly at [phone] (SKIN)  Your call will get routed to the dermatology surgeon on call even after hours

## 2021-03-29 NOTE — PROGRESS NOTES
PROCEDURE:  EXCISION WITH INTERMEDIATE LAYERED CLOSURE     Attending: Ashlee Lou  Assistant: Shea Strauss    Pre-Op Diagnosis: Moderately atypical nevus  Post-Op Diagnosis: Same   Benign versus Malignant Benign      Lesion Anatomic Location: Scalp (Right temporal scalp) (Previous Accession Number: [id number])  Pre-op size: 0 5 cm x 0 4 cmcm  Size of defect:  0 9 cm x 0 8 cm (with 0 2 centimeter margins)  Final repaired wound length:  2 6 cm    Written and verbal, witnessed informed consent was obtained  I discussed that excision is a method of removing lesions both benign and malignant lesions  A portion of normal skin is often taken to ensure completeness of removal   I reviewed that procedure will include numbing the area,  cutting around and under defect, undermining tissue, and closing the wound with sutures both inside and out  These sutures are usually removed in 7 to 14 days  Risks (bleeding, pain, infection, scarring, recurrence) and benefits discussed  It was discussed with patient that every effort is made to minimize scar, but scarring is influenced also by extrinsic factor such as location, age and genetics  Time Out: performed:  yes  Correct patient: yes  Correct site per Clinic Report: yes  Correct site per Patient Report: yes    LOCAL ANESTHESIA: 1% xylocaine with epi     DESCRIPTION OF PROCEDURE: The patient was brought back into the procedure room, anesthetized locally, prepped and draped in the usual fashion  Using a #15 blade with a scalpel, the lesion was excised in elliptical fashion  The wound was  undermined in the  fascial plane  Hemostasis was achieved with light electrocoagulation  Purpose of undermining was to decrease wound tension and facilitate closure      The wound was closed with subcutaneous sutures as follows:    Deep suture:3-0 Vicryl      Epidermal edge closure was accomplished with superficial sutures as follows:    Superficial suture: 5-0 Prolene  Superficial suture type: running, locked    Estimated blood loss less than 3ml  The patient tolerated the procedure well without any complications  The wound was cleaned with sterile saline, dried off, surgical vaseline ointment was applied, and the wound was covered  A pressure dressing was applied for stabilization and light pressure  The patient was given detailed oral and written instructions on postoperative care as detailed in consent  The patient left in good medical condition  POSTOP DISCUSSION DISCUSSION AND INSTRUCTIONS FOR PATIENT      Rationale for Procedure  A skin excision allows the dermatologist to remove a lesion  The procedure involves a local numbing medication and removing the entire lesion  Typically, the lesion is being removed because it is atypical, traumatized, or for significant appearance reasons  The area will be open like a brush burn and allowed to heal    There will be no sutures  Tissue is sent to pathologist who will reconfirm diagnosis and verify completeness of lesion removal     Description of Procedure  We would like to perform a skin excision today  A local anesthetic, similar to the kind that a dentist uses when filling a cavity, will be injected with a very small needle into the skin area to be sampled  The injected skin and tissue underneath should go to sleep and become numbed so that no further pain should be felt  A scalpel will be used to cut around the lesion and tissue will be submitted to pathology for examination  Depending on the diagnosis the lesion will be excised with a certain amount of normal skin to help assure completeness of lesion removal   The physician will discuss in advance the anticipated size and extent of removal    Bleeding will occur, but it will stopped with direct pressure, sutures, and electrocautery  Surgical Vaseline-type ointment will also applied after the procedure to help create a barrier between the wound and the outside world        Risks and Potential Complications  The advantage of a skin excision is that it allows us to remove a problem lesion quickly  Although this usually permits the lesion to heal as soon as possible with the least scarring, there are some risks and potential complications that include but are not limited to the following:  - Some bleeding is normal at time of procedure and some bleeding on gauze is normal  the first few days after surgery  Profuse bleeding and bleeding with swelling and pain should be reported as detailed  below  - Infection is uncommon in skin surgery  Infection should be reported and is indicated by pain, redness, and discharge of purulent material   - Some dull to at time sharp pain could occur initially the day after surgery  Persistent pain or increasing pain days after surgery is not expected and should be reported  - Every effort is made to minimize scar, but location, size, and genetics do play a role in scar appearance  A surgical wound does not achieve its optimal appearance until 6 months  There are several treatments available if scarring would be problematic including scar creams, silicone pad, laser and scar revision   - Skin discoloration can occur especially in people of color  Its important to avoid sun on wound in first 6 months after surgery  Treatment is available if pigment is problematic   - Incomplete removal of the lesion or recurrence of lesion can occur and this would then require further treatment and more surgery   - Nerve Damage/Numbness/Loss of Function is very rare, but is most likely to occur if lesion is large or if it is in a high risk location  - Allergic Reaction to lidocaine is rare  More commonly,  epinephrine is used  with the lidocaine  Occasionally, epinephrine (aka adrenalin) may cause a brief  feeling of anxiety or jitteriness  - The person at the microscope  (pathologist) may provide additional information that was unexpected   This unexpected finding could provoke the need for additional treatment or evaluation  What You Will Need to Do After the Procedure  1  Keep the area clean and dry the first day  Try NOT to remove the bandage for the first day  2  Gently clean the area with soap and water and apply Vaseline ointment (this is over the counter and not a prescription) to the excision  site for up to 2 weeks  3  Apply a clean appropriately sized bandage to area  Gauze and paper tape are recommended for sensitive skin  4  Return for suture removal as instructed (generally 1 week for the face, 2 weeks for the body)  5  Take Acetaminophen (Tylenol) for discomfort, if no contraindications  Do NOT take Ibuprofen or aspirin unless specifically told to do so by your Dermatologist because these medications can make bleeding worse  6  Call our office immediately for signs of infection: fever, chills, increased redness, warmth, tenderness, discomfort/pain, or pus or foul smell coming from the wound  If bleeding is noticed, place a clean cloth over the area and apply firm pressure for thirty minutes  Check the wound ONLY after 30 minutes of direct pressure; do not cheat and sneak a peak, as that does not count  If bleeding persists after 30 minutes of legitimate direct pressure, then try one more round of direct pressure for an additional 10 minutes to the area  Should the bleeding become heavier or not stop after the second attempt, call [facility] directly at [phone] (SKIN) or, if after hours, go to your local Emergency Room/Emergency Department        Scribe Attestation    I,:  Winnie Naranjo MA am acting as a scribe while in the presence of the attending physician :       I,:  Ciro Rush MD personally performed the services described in this documentation    as scribed in my presence :

## 2021-04-06 NOTE — RESULT ENCOUNTER NOTE
Please call the patient regarding complete excision- no atypical mole left, no further treatment needed

## 2021-04-08 ENCOUNTER — OFFICE VISIT (OUTPATIENT)
Dept: DERMATOLOGY | Facility: CLINIC | Age: 41
End: 2021-04-08

## 2021-04-08 DIAGNOSIS — Z48.02 ENCOUNTER FOR REMOVAL OF SUTURES: Primary | ICD-10-CM

## 2021-04-08 PROCEDURE — 99024 POSTOP FOLLOW-UP VISIT: CPT | Performed by: STUDENT IN AN ORGANIZED HEALTH CARE EDUCATION/TRAINING PROGRAM

## 2021-04-08 NOTE — PROGRESS NOTES
Suture removal    Date/Time: 4/8/2021 1:44 PM  Performed by: Aminta Hashimoto  Authorized by: Benny Can MD   Universal Protocol:  Consent: Verbal consent obtained  Written consent not obtained  Risks and benefits: risks, benefits and alternatives were discussed  Consent given by: patient  Timeout called at: 4/8/2021 1:44 PM   Patient understanding: patient states understanding of the procedure being performed  Patient consent: the patient's understanding of the procedure matches consent given  Procedure consent: procedure consent matches procedure scheduled  Relevant documents: relevant documents not present or verified  Test results: test results not available  Site marked: the operative site was not marked  Radiology Images displayed and confirmed  If images not available, report reviewed: imaging studies not available  Patient identity confirmed: verbally with patient        Patient location:  ED  Location:     Laterality:  Right    Location: Temporal Scalp  Procedure details: Tools used:  Suture removal kit    Wound appearance:  No sign(s) of infection, good wound healing and clean    Number of sutures removed:  1 (Runing suture )  Post-procedure details:     Post-removal:  Antibiotic ointment applied    Patient tolerance of procedure:   Tolerated well, no immediate complications      Scribe Attestation    I,:  Aminta Hashimoto am acting as a scribe while in the presence of the attending physician :       I,:  Benny Can MD personally performed the services described in this documentation    as scribed in my presence :

## 2021-04-19 ENCOUNTER — TELEPHONE (OUTPATIENT)
Dept: FAMILY MEDICINE CLINIC | Facility: CLINIC | Age: 41
End: 2021-04-19

## 2021-04-19 NOTE — TELEPHONE ENCOUNTER
Pt was told to call us to have lab/blood work ordered per Dr Braga Number to [facility]      #  [phone]

## 2021-05-19 DIAGNOSIS — E11.9 TYPE 2 DIABETES MELLITUS WITHOUT COMPLICATION, WITHOUT LONG-TERM CURRENT USE OF INSULIN (HCC): ICD-10-CM

## 2021-05-19 RX ORDER — SITAGLIPTIN 100 MG/1
TABLET, FILM COATED ORAL
Qty: 30 TABLET | Refills: 2 | Status: SHIPPED | OUTPATIENT
Start: 2021-05-19 | End: 2021-07-01 | Stop reason: SDUPTHER

## 2021-05-25 DIAGNOSIS — E11.69 HYPERLIPIDEMIA ASSOCIATED WITH TYPE 2 DIABETES MELLITUS (HCC): Primary | ICD-10-CM

## 2021-05-25 DIAGNOSIS — E78.5 HYPERLIPIDEMIA ASSOCIATED WITH TYPE 2 DIABETES MELLITUS (HCC): Primary | ICD-10-CM

## 2021-05-25 DIAGNOSIS — E11.9 TYPE 2 DIABETES MELLITUS WITHOUT COMPLICATION, WITHOUT LONG-TERM CURRENT USE OF INSULIN (HCC): ICD-10-CM

## 2021-05-25 DIAGNOSIS — E03.9 HYPOTHYROIDISM, UNSPECIFIED TYPE: ICD-10-CM

## 2021-05-25 DIAGNOSIS — E78.1 HYPERTRIGLYCERIDEMIA: ICD-10-CM

## 2021-05-25 RX ORDER — ATORVASTATIN CALCIUM 10 MG/1
TABLET, FILM COATED ORAL
Qty: 30 TABLET | Refills: 5 | Status: SHIPPED | OUTPATIENT
Start: 2021-05-25 | End: 2021-07-01 | Stop reason: SDUPTHER

## 2021-05-25 NOTE — TELEPHONE ENCOUNTER
Patient still has RX at pharmacy   Confirmed with pharmacist  Patient aware     Labs order place patient aware due for OV on jun with Labs   Will call office to set appointment after labs are completed

## 2021-06-18 ENCOUNTER — TELEPHONE (OUTPATIENT)
Dept: FAMILY MEDICINE CLINIC | Facility: CLINIC | Age: 41
End: 2021-06-18

## 2021-06-18 LAB
ALBUMIN SERPL-MCNC: 4 G/DL (ref 3.8–4.8)
ALBUMIN/GLOB SERPL: 2 {RATIO} (ref 1.2–2.2)
ALP SERPL-CCNC: 100 IU/L (ref 48–121)
ALT SERPL-CCNC: 32 IU/L (ref 0–32)
AST SERPL-CCNC: 27 IU/L (ref 0–40)
BILIRUB SERPL-MCNC: 1 MG/DL (ref 0–1.2)
BUN SERPL-MCNC: 16 MG/DL (ref 6–20)
BUN/CREAT SERPL: 24 (ref 9–23)
CALCIUM SERPL-MCNC: 9.5 MG/DL (ref 8.7–10.2)
CHLORIDE SERPL-SCNC: 105 MMOL/L (ref 96–106)
CHOLEST SERPL-MCNC: 154 MG/DL (ref 100–199)
CO2 SERPL-SCNC: 25 MMOL/L (ref 20–29)
CREAT SERPL-MCNC: 0.66 MG/DL (ref 0.57–1)
GLOBULIN SER-MCNC: 2 G/DL (ref 1.5–4.5)
GLUCOSE SERPL-MCNC: 170 MG/DL (ref 65–99)
HBA1C MFR BLD: 6.4 % (ref 4.8–5.6)
HDLC SERPL-MCNC: 33 MG/DL
LDLC SERPL CALC-MCNC: 66 MG/DL (ref 0–99)
LDLC/HDLC SERPL: 2 RATIO (ref 0–3.2)
POTASSIUM SERPL-SCNC: 4.5 MMOL/L (ref 3.5–5.2)
PROT SERPL-MCNC: 6 G/DL (ref 6–8.5)
SL AMB EGFR AFRICAN AMERICAN: 129 ML/MIN/1.73
SL AMB EGFR NON AFRICAN AMERICAN: 112 ML/MIN/1.73
SL AMB VLDL CHOLESTEROL CALC: 55 MG/DL (ref 5–40)
SODIUM SERPL-SCNC: 142 MMOL/L (ref 134–144)
TRIGL SERPL-MCNC: 350 MG/DL (ref 0–149)
TSH SERPL DL<=0.005 MIU/L-ACNC: 2.19 UIU/ML (ref 0.45–4.5)

## 2021-06-18 NOTE — TELEPHONE ENCOUNTER
----- Message from Shea Nicolas MD sent at 6/18/2021  8:40 AM EDT -----  Call patient with lab result-labs look good, A1c improved  Continue medications-due office visit for diabetes care  Please schedule

## 2021-06-18 NOTE — RESULT ENCOUNTER NOTE
Call patient with lab result-labs look good, A1c improved  Continue medications-due office visit for diabetes care  Please schedule

## 2021-07-01 ENCOUNTER — OFFICE VISIT (OUTPATIENT)
Dept: FAMILY MEDICINE CLINIC | Facility: CLINIC | Age: 41
End: 2021-07-01
Payer: COMMERCIAL

## 2021-07-01 VITALS
OXYGEN SATURATION: 94 % | DIASTOLIC BLOOD PRESSURE: 74 MMHG | HEIGHT: 62 IN | SYSTOLIC BLOOD PRESSURE: 116 MMHG | WEIGHT: 183 LBS | HEART RATE: 82 BPM | TEMPERATURE: 98.2 F | BODY MASS INDEX: 33.68 KG/M2

## 2021-07-01 DIAGNOSIS — E11.9 TYPE 2 DIABETES MELLITUS WITHOUT COMPLICATION, WITHOUT LONG-TERM CURRENT USE OF INSULIN (HCC): Primary | ICD-10-CM

## 2021-07-01 DIAGNOSIS — E11.69 HYPERLIPIDEMIA ASSOCIATED WITH TYPE 2 DIABETES MELLITUS (HCC): ICD-10-CM

## 2021-07-01 DIAGNOSIS — I48.0 PAROXYSMAL ATRIAL FIBRILLATION (HCC): ICD-10-CM

## 2021-07-01 DIAGNOSIS — E78.1 HYPERTRIGLYCERIDEMIA: ICD-10-CM

## 2021-07-01 DIAGNOSIS — E78.5 HYPERLIPIDEMIA ASSOCIATED WITH TYPE 2 DIABETES MELLITUS (HCC): ICD-10-CM

## 2021-07-01 PROBLEM — R79.89 ELEVATED LFTS: Status: RESOLVED | Noted: 2020-06-01 | Resolved: 2021-07-01

## 2021-07-01 PROCEDURE — 99214 OFFICE O/P EST MOD 30 MIN: CPT | Performed by: FAMILY MEDICINE

## 2021-07-01 PROCEDURE — 3725F SCREEN DEPRESSION PERFORMED: CPT | Performed by: FAMILY MEDICINE

## 2021-07-01 PROCEDURE — 1036F TOBACCO NON-USER: CPT | Performed by: FAMILY MEDICINE

## 2021-07-01 PROCEDURE — 3008F BODY MASS INDEX DOCD: CPT | Performed by: FAMILY MEDICINE

## 2021-07-01 RX ORDER — ATORVASTATIN CALCIUM 20 MG/1
20 TABLET, FILM COATED ORAL DAILY
Qty: 30 TABLET | Refills: 5 | Status: SHIPPED | OUTPATIENT
Start: 2021-07-01 | End: 2022-02-24

## 2021-07-01 NOTE — PROGRESS NOTES
[id number]         NAME: Emmy Phipps is a 45 y o  female  : 1980    MRN: [id number]  DATE: 2021  TIME: 4:46 PM    Assessment and Plan   Type 2 diabetes mellitus without complication, without long-term current use of insulin ([facility] ) [E11 9]  1  Type 2 diabetes mellitus without complication, without long-term current use of insulin (HCC)  atorvastatin (LIPITOR) 20 mg tablet    sitaGLIPtin (Januvia) 100 mg tablet    Comprehensive metabolic panel    Lipid Panel with Direct LDL reflex    Hemoglobin A1C    Comprehensive metabolic panel    Lipid Panel with Direct LDL reflex    Hemoglobin A1C    Microalbumin,Urine   2  Hyperlipidemia associated with type 2 diabetes mellitus (HCC)  Comprehensive metabolic panel    Lipid Panel with Direct LDL reflex    Comprehensive metabolic panel    Lipid Panel with Direct LDL reflex   3  Paroxysmal atrial fibrillation (HCC)     4  Hypertriglyceridemia  Lipid Panel with Direct LDL reflex    Lipid Panel with Direct LDL reflex   5  BMI 33 0-33 9,adult         Paroxysmal atrial fibrillation (HCC)  No recent episodes  Stable on meds  Followed by Cardiology  Type 2 diabetes mellitus without complication, without long-term current use of insulin (HCC)    Lab Results   Component Value Date    HGBA1C 6 4 (H) 2021   Good control on Januvia 100 mg daily  Hyperlipidemia associated with type 2 diabetes mellitus ([facility] )    Lab Results   Component Value Date    HGBA1C 6 4 (H) 2021   With marked triglycerides elevation will increase atorvastatin to 20 mg daily  Will repeat in 3 months  Patient also will continue to increase her exercise and watch her diet  Patient Instructions     Patient Instructions     Continue current medications  Due to high triglycerides will boost up Lipitor to 20 mg daily  Repeat CMP, lipid panel, and A1c in 3 months  Microalbumin collect in the office today    I will call you if something needs to be done   Follow-up with Cardiology  Okay to start your proposed diet  This will help with weight loss  Weight Management   AMBULATORY CARE:   Why it is important to manage your weight:  Being overweight increases your risk of health conditions such as heart disease, high blood pressure, type 2 diabetes, and certain types of cancer  It can also increase your risk for osteoarthritis, sleep apnea, and other respiratory problems  Aim for a slow, steady weight loss  Even a small amount of weight loss can lower your risk of health problems  How to lose weight safely:  A safe and healthy way to lose weight is to eat fewer calories and get regular exercise  · You can lose up about 1 pound a week by decreasing the number of calories you eat by 500 calories each day  You can decrease calories by eating smaller portion sizes or by cutting out high-calorie foods  Read labels to find out how many calories are in the foods you eat  · You can also burn calories with exercise such as walking, swimming, or biking  You will be more likely to keep weight off if you make these changes part of your lifestyle  Exercise at least 30 minutes per day on most days of the week  You can also fit in more physical activity by taking the stairs instead of the elevator or parking farther away from stores  Ask your healthcare provider about the best exercise plan for you  Healthy meal plan for weight management:  A healthy meal plan includes a variety of foods, contains fewer calories, and helps you stay healthy  A healthy meal plan includes the following:     · Eat whole-grain foods more often  A healthy meal plan should contain fiber  Fiber is the part of grains, fruits, and vegetables that is not broken down by your body  Whole-grain foods are healthy and provide extra fiber in your diet  Some examples of whole-grain foods are whole-wheat breads and pastas, oatmeal, brown rice, and bulgur      · Eat a variety of vegetables every day   Include dark, leafy greens such as spinach, kale, jon greens, and mustard greens  Eat yellow and orange vegetables such as carrots, sweet potatoes, and winter squash  · Eat a variety of fruits every day  Choose fresh or canned fruit (canned in its own juice or light syrup) instead of juice  Fruit juice has very little or no fiber  · Eat low-fat dairy foods  Drink fat-free (skim) milk or 1% milk  Eat fat-free yogurt and low-fat cottage cheese  Try low-fat cheeses such as mozzarella and other reduced-fat cheeses  · Choose meat and other protein foods that are low in fat  Choose beans or other legumes such as split peas or lentils  Choose fish, skinless poultry (chicken or turkey), or lean cuts of red meat (beef or pork)  Before you cook meat or poultry, cut off any visible fat  · Use less fat and oil  Try baking foods instead of frying them  Add less fat, such as margarine, sour cream, regular salad dressing and mayonnaise to foods  Eat fewer high-fat foods  Some examples of high-fat foods include french fries, doughnuts, ice cream, and cakes  · Eat fewer sweets  Limit foods and drinks that are high in sugar  This includes candy, cookies, regular soda, and sweetened drinks  Ways to decrease calories:   · Eat smaller portions  ? Use a small plate with smaller servings  ? Do not eat second helpings  ? When you eat at a restaurant, ask for a box and place half of your meal in the box before you eat  ? Share an entrée with someone else  · Replace high-calorie snacks with healthy, low-calorie snacks  ? Choose fresh fruit, vegetables, fat-free rice cakes, or air-popped popcorn instead of potato chips, nuts, or chocolate  ? Choose water or calorie-free drinks instead of soda or sweetened drinks  · Do not shop for groceries when you are hungry  You may be more likely to make unhealthy food choices   Take a grocery list of healthy foods and shop after you have eaten     · Eat regular meals  Do not skip meals  Skipping meals can lead to overeating later in the day  This can make it harder for you to lose weight  Eat a healthy snack in place of a meal if you do not have time to eat a regular meal  Talk with a dietitian to help you create a meal plan and schedule that is right for you  Other things to consider as you try to lose weight:   · Be aware of situations that may give you the urge to overeat, such as eating while watching television  Find ways to avoid these situations  For example, read a book, go for a walk, or do crafts  · Meet with a weight loss support group or friends who are also trying to lose weight  This may help you stay motivated to continue working on your weight loss goals  © Copyright [address] Information is for End User's use only and may not be sold, redistributed or otherwise used for commercial purposes  All illustrations and images included in CareNotes® are the copyrighted property of A D A M , [employer]  or [address] [place] [address]  The above information is an  only  It is not intended as medical advice for individual conditions or treatments  Talk to your doctor, nurse or pharmacist before following any medical regimen to see if it is safe and effective for you  Chief Complaint   No chief complaint on file  History of Present Illness       Medical follow-up visit  Recent labs are reviewed  Medications are reviewed and reconciled  1) diabetes-A1c at 6 4  She has stop the metformin is taking only Januvia 100 mg daily  She has some side effects from metformin  2) hyperlipidemia-levels are slightly elevated particularly the triglycerides  Currently on atorvastatin 10 mg daily  3) paroxysmal atrial fibrillation-stable on current medications  Does see Cardiology      4) markedly elevated triglycerides per      Review of Systems   Review of Systems   Constitutional: Negative for activity change, appetite change, diaphoresis and fatigue  Respiratory: Negative for cough, chest tightness, shortness of breath and wheezing  Cardiovascular: Negative for chest pain, palpitations and leg swelling  Fast or slow heart rate   Gastrointestinal: Negative for abdominal pain, blood in stool, constipation, diarrhea, nausea and vomiting  Genitourinary: Negative for difficulty urinating, dysuria and frequency  Neurological: Negative for dizziness, light-headedness and headaches  Psychiatric/Behavioral: Negative for agitation, confusion, dysphoric mood and sleep disturbance  The patient is not nervous/anxious            Current Medications       Current Outpatient Medications:     aspirin 81 MG tablet, Take 81 mg by mouth daily, Disp: , Rfl:     atorvastatin (LIPITOR) 20 mg tablet, Take 1 tablet (20 mg total) by mouth daily, Disp: 30 tablet, Rfl: 5    Blood Glucose Monitoring Suppl (ONE TOUCH ULTRA 2) w/Device KIT, USE TO TEST BLOOD SUGAR, Disp: 1 each, Rfl: 0    flecainide (TAMBOCOR) 100 mg tablet, , Disp: , Rfl:     glucose blood (OneTouch Ultra) test strip, Test once daily, Disp: 100 each, Rfl: 3    Lancets (OneTouch Delica Plus [id number]) MISC, TEST 1-2 TIMES A DAY, Disp: 100 each, Rfl: 3    metoprolol succinate (TOPROL-XL) 25 mg 24 hr tablet, Take 25 mg by mouth daily, Disp: , Rfl:     sitaGLIPtin (Januvia) 100 mg tablet, Take 1 tablet (100 mg total) by mouth daily, Disp: 30 tablet, Rfl: 5    Current Allergies     Allergies as of 07/01/2021    (No Known Allergies)            The following portions of the patient's history were reviewed and updated as appropriate: allergies, current medications, past family history, past medical history, past social history, past surgical history and problem list      Past Medical History:   Diagnosis Date    Adnexal cyst     left    Elevated LFTs 6/1/2020    History of atrial fibrillation     ablation done, takes metoprolol    Irregular periods     Varicella        Past Surgical History:   Procedure Laterality Date    CARDIAC ELECTROPHYSIOLOGY STUDY AND ABLATION      CATARACT EXTRACTION      EYE SURGERY      OTHER SURGICAL HISTORY      catheter ablation atrial fibrillation    OVARIAN CYST SURGERY Right 2/2/2018    Procedure: LAPAROSCOPIC PARAOVARIAN  CYSTECTOMY;  Surgeon: Rosa Isela Nevarez MD;  Location:  MAIN OR;  Service: Gynecology    SKIN BIOPSY         Family History   Problem Relation Age of Onset    Heart disease Father     Hypertension Maternal Grandmother     Heart disease Family     Cancer Family     Thyroid disease Family     Substance Abuse Neg Hx     Mental illness Neg Hx          Medications have been verified  Objective   /74 (BP Location: Left arm, Patient Position: Sitting, Cuff Size: Adult)   Pulse 82   Temp 98 2 °F (36 8 °C) (Oral)   Ht 5' 2" (1 575 m)   Wt 83 kg (183 lb)   SpO2 94%   BMI 33 47 kg/m²        Physical Exam     Physical Exam  Vitals reviewed  Constitutional:       General: She is not in acute distress  Appearance: She is well-developed  She is not diaphoretic  HENT:      Head: Normocephalic and atraumatic  Right Ear: Tympanic membrane, ear canal and external ear normal       Left Ear: Tympanic membrane, ear canal and external ear normal       Nose: No mucosal edema or rhinorrhea  Right Sinus: No maxillary sinus tenderness  Left Sinus: No maxillary sinus tenderness  Mouth/Throat:      Mouth: Mucous membranes are not pale and not dry  Dentition: Normal dentition  Pharynx: Uvula midline  No oropharyngeal exudate  Eyes:      General:         Right eye: No discharge  Left eye: No discharge  Pupils: Pupils are equal, round, and reactive to light  Neck:      Thyroid: No thyromegaly  Cardiovascular:      Rate and Rhythm: Normal rate and regular rhythm        Pulses: no weak pulses          Dorsalis pedis pulses are 2+ on the right side and 2+ on the left side  Posterior tibial pulses are 2+ on the right side and 2+ on the left side  Heart sounds: Normal heart sounds  No murmur heard  Pulmonary:      Effort: Pulmonary effort is normal  No respiratory distress  Breath sounds: Normal breath sounds  No wheezing or rales  Musculoskeletal:         General: Normal range of motion  Cervical back: Normal range of motion and neck supple  Right lower leg: No edema  Left lower leg: No edema  Feet:      Right foot:      Skin integrity: No ulcer, skin breakdown, erythema, warmth, callus or dry skin  Left foot:      Skin integrity: No ulcer, skin breakdown, erythema, warmth, callus or dry skin  Lymphadenopathy:      Cervical: No cervical adenopathy  Neurological:      Mental Status: She is alert and oriented to person, place, and time  Cranial Nerves: No cranial nerve deficit  Psychiatric:         Behavior: Behavior normal        Patient's shoes and socks removed  Right Foot/Ankle   Right Foot Inspection  Skin Exam: skin normal and skin intact no dry skin, no warmth, no callus, no erythema, no maceration, no abnormal color, no pre-ulcer, no ulcer and no callus                            Sensory   Vibration: intact    Monofilament testing: intact  Vascular  Capillary refills: < 3 seconds  The right DP pulse is 2+  The right PT pulse is 2+  Left Foot/Ankle  Left Foot Inspection  Skin Exam: skin normal and skin intactno dry skin, no warmth, no erythema, no maceration, normal color, no pre-ulcer, no ulcer and no callus                                         Sensory   Vibration: intact    Monofilament: intact  Vascular  Capillary refills: < 3 seconds  The left DP pulse is 2+  The left PT pulse is 2+  Assign Risk Category:  No deformity present; No loss of protective sensation; No weak pulses       Risk: 0            BMI Counseling: Body mass index is 33 47 kg/m²   The BMI is above normal  Nutrition recommendations include reducing portion sizes, decreasing overall calorie intake, 3-5 servings of fruits/vegetables daily and reducing intake of cholesterol

## 2021-07-01 NOTE — ASSESSMENT & PLAN NOTE
Lab Results   Component Value Date    HGBA1C 6 4 (H) 06/17/2021   Good control on Januvia 100 mg daily

## 2021-07-01 NOTE — ASSESSMENT & PLAN NOTE
Lab Results   Component Value Date    HGBA1C 6 4 (H) 06/17/2021   With marked triglycerides elevation will increase atorvastatin to 20 mg daily  Will repeat in 3 months  Patient also will continue to increase her exercise and watch her diet

## 2021-07-01 NOTE — PATIENT INSTRUCTIONS
Continue current medications  Due to high triglycerides will boost up Lipitor to 20 mg daily  Repeat CMP, lipid panel, and A1c in 3 months  Microalbumin collect in the office today  I will call you if something needs to be done  Follow-up with Cardiology  Okay to start your proposed diet  This will help with weight loss  Weight Management   AMBULATORY CARE:   Why it is important to manage your weight:  Being overweight increases your risk of health conditions such as heart disease, high blood pressure, type 2 diabetes, and certain types of cancer  It can also increase your risk for osteoarthritis, sleep apnea, and other respiratory problems  Aim for a slow, steady weight loss  Even a small amount of weight loss can lower your risk of health problems  How to lose weight safely:  A safe and healthy way to lose weight is to eat fewer calories and get regular exercise  · You can lose up about 1 pound a week by decreasing the number of calories you eat by 500 calories each day  You can decrease calories by eating smaller portion sizes or by cutting out high-calorie foods  Read labels to find out how many calories are in the foods you eat  · You can also burn calories with exercise such as walking, swimming, or biking  You will be more likely to keep weight off if you make these changes part of your lifestyle  Exercise at least 30 minutes per day on most days of the week  You can also fit in more physical activity by taking the stairs instead of the elevator or parking farther away from stores  Ask your healthcare provider about the best exercise plan for you  Healthy meal plan for weight management:  A healthy meal plan includes a variety of foods, contains fewer calories, and helps you stay healthy  A healthy meal plan includes the following:     · Eat whole-grain foods more often  A healthy meal plan should contain fiber   Fiber is the part of grains, fruits, and vegetables that is not broken down by your body  Whole-grain foods are healthy and provide extra fiber in your diet  Some examples of whole-grain foods are whole-wheat breads and pastas, oatmeal, brown rice, and bulgur  · Eat a variety of vegetables every day  Include dark, leafy greens such as spinach, kale, jon greens, and mustard greens  Eat yellow and orange vegetables such as carrots, sweet potatoes, and winter squash  · Eat a variety of fruits every day  Choose fresh or canned fruit (canned in its own juice or light syrup) instead of juice  Fruit juice has very little or no fiber  · Eat low-fat dairy foods  Drink fat-free (skim) milk or 1% milk  Eat fat-free yogurt and low-fat cottage cheese  Try low-fat cheeses such as mozzarella and other reduced-fat cheeses  · Choose meat and other protein foods that are low in fat  Choose beans or other legumes such as split peas or lentils  Choose fish, skinless poultry (chicken or turkey), or lean cuts of red meat (beef or pork)  Before you cook meat or poultry, cut off any visible fat  · Use less fat and oil  Try baking foods instead of frying them  Add less fat, such as margarine, sour cream, regular salad dressing and mayonnaise to foods  Eat fewer high-fat foods  Some examples of high-fat foods include french fries, doughnuts, ice cream, and cakes  · Eat fewer sweets  Limit foods and drinks that are high in sugar  This includes candy, cookies, regular soda, and sweetened drinks  Ways to decrease calories:   · Eat smaller portions  ? Use a small plate with smaller servings  ? Do not eat second helpings  ? When you eat at a restaurant, ask for a box and place half of your meal in the box before you eat  ? Share an entrée with someone else  · Replace high-calorie snacks with healthy, low-calorie snacks  ? Choose fresh fruit, vegetables, fat-free rice cakes, or air-popped popcorn instead of potato chips, nuts, or chocolate  ?  Choose water or calorie-free drinks instead of soda or sweetened drinks  · Do not shop for groceries when you are hungry  You may be more likely to make unhealthy food choices  Take a grocery list of healthy foods and shop after you have eaten  · Eat regular meals  Do not skip meals  Skipping meals can lead to overeating later in the day  This can make it harder for you to lose weight  Eat a healthy snack in place of a meal if you do not have time to eat a regular meal  Talk with a dietitian to help you create a meal plan and schedule that is right for you  Other things to consider as you try to lose weight:   · Be aware of situations that may give you the urge to overeat, such as eating while watching television  Find ways to avoid these situations  For example, read a book, go for a walk, or do crafts  · Meet with a weight loss support group or friends who are also trying to lose weight  This may help you stay motivated to continue working on your weight loss goals  © Copyright [address] Information is for End User's use only and may not be sold, redistributed or otherwise used for commercial purposes  All illustrations and images included in CareNotes® are the copyrighted property of A D A M , Inc  or [facility] [employer] [employer]   The above information is an  only  It is not intended as medical advice for individual conditions or treatments  Talk to your doctor, nurse or pharmacist before following any medical regimen to see if it is safe and effective for you

## 2021-07-02 LAB — MICROALBUMIN UR-MCNC: 5.1 UG/ML

## 2021-07-02 PROCEDURE — 3061F NEG MICROALBUMINURIA REV: CPT | Performed by: FAMILY MEDICINE

## 2021-07-06 ENCOUNTER — TELEPHONE (OUTPATIENT)
Dept: FAMILY MEDICINE CLINIC | Facility: CLINIC | Age: 41
End: 2021-07-06

## 2021-07-06 NOTE — TELEPHONE ENCOUNTER
----- Message from Melodie Pantoja MD sent at 7/6/2021  8:03 AM EDT -----  Call patient with lab result-small amount of protein in urine but better than previous  Continue same meds  She should be taking either an Ace or an Arb due to diabetes and spilling some protein  Issue willing to go back on this

## 2021-07-06 NOTE — RESULT ENCOUNTER NOTE
Call patient with lab result-small amount of protein in urine but better than previous  Continue same meds  She should be taking either an Ace or an Arb due to diabetes and spilling some protein  Issue willing to go back on this

## 2021-12-03 DIAGNOSIS — E11.9 TYPE 2 DIABETES MELLITUS WITHOUT COMPLICATION, WITHOUT LONG-TERM CURRENT USE OF INSULIN (HCC): ICD-10-CM

## 2021-12-03 RX ORDER — ATORVASTATIN CALCIUM 10 MG/1
TABLET, FILM COATED ORAL
Qty: 30 TABLET | Refills: 5 | Status: SHIPPED | OUTPATIENT
Start: 2021-12-03 | End: 2022-05-21

## 2021-12-13 ENCOUNTER — PATIENT OUTREACH (OUTPATIENT)
Dept: CASE MANAGEMENT | Facility: OTHER | Age: 41
End: 2021-12-13

## 2022-01-31 ENCOUNTER — RA CDI HCC (OUTPATIENT)
Dept: OTHER | Facility: HOSPITAL | Age: 42
End: 2022-01-31

## 2022-01-31 NOTE — PROGRESS NOTES
[facility]  coding opportunities          Number of diagnosis code(s) already on the problem list added to FYI fla  E11 69,E78 5  I48 0                     Patients insurance company: [employer] ([employer])

## 2022-02-24 ENCOUNTER — OFFICE VISIT (OUTPATIENT)
Dept: FAMILY MEDICINE CLINIC | Facility: CLINIC | Age: 42
End: 2022-02-24
Payer: COMMERCIAL

## 2022-02-24 VITALS
SYSTOLIC BLOOD PRESSURE: 120 MMHG | OXYGEN SATURATION: 97 % | HEART RATE: 76 BPM | BODY MASS INDEX: 34.13 KG/M2 | TEMPERATURE: 98.5 F | WEIGHT: 186.6 LBS | DIASTOLIC BLOOD PRESSURE: 82 MMHG

## 2022-02-24 DIAGNOSIS — Z01.818 PRE-OP EXAMINATION: ICD-10-CM

## 2022-02-24 DIAGNOSIS — E11.9 TYPE 2 DIABETES MELLITUS WITHOUT COMPLICATION, WITHOUT LONG-TERM CURRENT USE OF INSULIN (HCC): Primary | ICD-10-CM

## 2022-02-24 DIAGNOSIS — Z86.79 HISTORY OF ATRIAL FIBRILLATION: ICD-10-CM

## 2022-02-24 PROCEDURE — 99214 OFFICE O/P EST MOD 30 MIN: CPT | Performed by: FAMILY MEDICINE

## 2022-02-24 PROCEDURE — 1036F TOBACCO NON-USER: CPT | Performed by: FAMILY MEDICINE

## 2022-02-24 PROCEDURE — 3725F SCREEN DEPRESSION PERFORMED: CPT | Performed by: FAMILY MEDICINE

## 2022-02-24 NOTE — PROGRESS NOTES
Assessment/Plan:    Pre-op examination  EKG by Carmen Cardiol--NL--no labs indicated--cleared for proposed surg       Diagnoses and all orders for this visit:    Type 2 diabetes mellitus without complication, without long-term current use of insulin ([facility] [address] )    History of atrial fibrillation    Pre-op examination          Subjective:   Chief Complaint   Patient presents with    Pre-op Exam     Cataract Left to be done 2/25/22        Patient ID: Eyal Ramirez is a 37 y o  female  Pre-op for eye surg--see opthal report      The following portions of the patient's history were reviewed and updated as appropriate: allergies, current medications, past family history, past medical history, past social history, past surgical history and problem list     Review of Systems   Constitutional: Negative for fatigue, fever and unexpected weight change  HENT: Negative for congestion, sinus pain and sore throat  Eyes: Negative for visual disturbance  Respiratory: Negative for shortness of breath and wheezing  Cardiovascular: Negative for chest pain and palpitations  Gastrointestinal: Negative for abdominal pain, nausea and vomiting  Musculoskeletal: Negative  Negative for arthralgias and myalgias  Neurological: Negative for syncope, weakness and numbness  Psychiatric/Behavioral: Negative  Negative for confusion, dysphoric mood and suicidal ideas  Objective:  Vitals:    02/24/22 1327   BP: 120/82   BP Location: Right arm   Patient Position: Sitting   Cuff Size: Large   Pulse: 76   Temp: 98 5 °F (36 9 °C)   TempSrc: Oral   SpO2: 97%   Weight: 84 6 kg (186 lb 9 6 oz)      Physical Exam  Constitutional:       Appearance: She is well-developed  HENT:      Right Ear: Ear canal normal  Tympanic membrane is not injected  Left Ear: Ear canal normal  Tympanic membrane is not injected  Nose: Nose normal    Eyes:      General:         Right eye: No discharge  Left eye: No discharge  Conjunctiva/sclera: Conjunctivae normal       Pupils: Pupils are equal, round, and reactive to light  Neck:      Thyroid: No thyromegaly  Cardiovascular:      Rate and Rhythm: Normal rate and regular rhythm  Heart sounds: Normal heart sounds  No murmur heard  Pulmonary:      Effort: Pulmonary effort is normal  No respiratory distress  Breath sounds: Normal breath sounds  No wheezing  Abdominal:      General: Bowel sounds are normal  There is no distension  Palpations: Abdomen is soft  Tenderness: There is no abdominal tenderness  Musculoskeletal:         General: Normal range of motion  Cervical back: Normal range of motion and neck supple  Lymphadenopathy:      Cervical: No cervical adenopathy  Skin:     General: Skin is warm and dry  Neurological:      Mental Status: She is alert and oriented to person, place, and time  She is not disoriented  Sensory: No sensory deficit  Gait: Gait normal       Deep Tendon Reflexes: Reflexes are normal and symmetric  Psychiatric:         Speech: Speech normal          Behavior: Behavior normal          Thought Content:  Thought content normal          Judgment: Judgment normal

## 2022-05-21 DIAGNOSIS — E11.9 TYPE 2 DIABETES MELLITUS WITHOUT COMPLICATION, WITHOUT LONG-TERM CURRENT USE OF INSULIN (HCC): ICD-10-CM

## 2022-05-21 RX ORDER — ATORVASTATIN CALCIUM 10 MG/1
TABLET, FILM COATED ORAL
Qty: 30 TABLET | Refills: 5 | Status: SHIPPED | OUTPATIENT
Start: 2022-05-21 | End: 2022-07-15 | Stop reason: SDUPTHER

## 2022-06-29 DIAGNOSIS — E11.9 TYPE 2 DIABETES MELLITUS WITHOUT COMPLICATION, WITHOUT LONG-TERM CURRENT USE OF INSULIN (HCC): ICD-10-CM

## 2022-06-29 RX ORDER — SITAGLIPTIN 100 MG/1
TABLET, FILM COATED ORAL
Qty: 30 TABLET | Refills: 0 | Status: SHIPPED | OUTPATIENT
Start: 2022-06-29 | End: 2022-07-15 | Stop reason: SDUPTHER

## 2022-07-09 LAB
ALBUMIN SERPL-MCNC: 4.1 G/DL (ref 3.8–4.8)
ALBUMIN/GLOB SERPL: 2.1 {RATIO} (ref 1.2–2.2)
ALP SERPL-CCNC: 99 IU/L (ref 44–121)
ALT SERPL-CCNC: 86 IU/L (ref 0–32)
AST SERPL-CCNC: 69 IU/L (ref 0–40)
BILIRUB SERPL-MCNC: 1.5 MG/DL (ref 0–1.2)
BUN SERPL-MCNC: 10 MG/DL (ref 6–24)
BUN/CREAT SERPL: 14 (ref 9–23)
CALCIUM SERPL-MCNC: 9 MG/DL (ref 8.7–10.2)
CHLORIDE SERPL-SCNC: 101 MMOL/L (ref 96–106)
CHOLEST SERPL-MCNC: 137 MG/DL (ref 100–199)
CO2 SERPL-SCNC: 25 MMOL/L (ref 20–29)
CREAT SERPL-MCNC: 0.69 MG/DL (ref 0.57–1)
EGFR: 112 ML/MIN/1.73
EST. AVERAGE GLUCOSE BLD GHB EST-MCNC: 258 MG/DL
GLOBULIN SER-MCNC: 2 G/DL (ref 1.5–4.5)
GLUCOSE SERPL-MCNC: 265 MG/DL (ref 65–99)
HBA1C MFR BLD: 10.6 % (ref 4.8–5.6)
HDLC SERPL-MCNC: 26 MG/DL
LDLC SERPL CALC-MCNC: 30 MG/DL (ref 0–99)
LDLC/HDLC SERPL: 1.2 RATIO (ref 0–3.2)
POTASSIUM SERPL-SCNC: 3.7 MMOL/L (ref 3.5–5.2)
PROT SERPL-MCNC: 6.1 G/DL (ref 6–8.5)
SL AMB VLDL CHOLESTEROL CALC: 81 MG/DL (ref 5–40)
SODIUM SERPL-SCNC: 140 MMOL/L (ref 134–144)
TRIGL SERPL-MCNC: 599 MG/DL (ref 0–149)

## 2022-07-09 PROCEDURE — 3046F HEMOGLOBIN A1C LEVEL >9.0%: CPT | Performed by: FAMILY MEDICINE

## 2022-07-12 ENCOUNTER — TELEPHONE (OUTPATIENT)
Dept: FAMILY MEDICINE CLINIC | Facility: CLINIC | Age: 42
End: 2022-07-12

## 2022-07-12 NOTE — TELEPHONE ENCOUNTER
----- Message from Barron Borjas MD sent at 7/11/2022 12:35 PM EDT -----  Call patient with lab result-let her know the sugars are up, s she still taking medications as prescribed? I will discuss at visit on the 15th  Be sure she keeps that appointment

## 2022-07-15 ENCOUNTER — OFFICE VISIT (OUTPATIENT)
Dept: FAMILY MEDICINE CLINIC | Facility: CLINIC | Age: 42
End: 2022-07-15
Payer: COMMERCIAL

## 2022-07-15 VITALS
OXYGEN SATURATION: 96 % | DIASTOLIC BLOOD PRESSURE: 82 MMHG | RESPIRATION RATE: 16 BRPM | WEIGHT: 182 LBS | SYSTOLIC BLOOD PRESSURE: 134 MMHG | BODY MASS INDEX: 33.49 KG/M2 | HEIGHT: 62 IN | HEART RATE: 83 BPM

## 2022-07-15 DIAGNOSIS — E11.9 TYPE 2 DIABETES MELLITUS WITHOUT COMPLICATION, WITHOUT LONG-TERM CURRENT USE OF INSULIN (HCC): Primary | ICD-10-CM

## 2022-07-15 DIAGNOSIS — E78.5 HYPERLIPIDEMIA ASSOCIATED WITH TYPE 2 DIABETES MELLITUS (HCC): ICD-10-CM

## 2022-07-15 DIAGNOSIS — I48.0 PAROXYSMAL ATRIAL FIBRILLATION (HCC): ICD-10-CM

## 2022-07-15 DIAGNOSIS — E11.69 HYPERLIPIDEMIA ASSOCIATED WITH TYPE 2 DIABETES MELLITUS (HCC): ICD-10-CM

## 2022-07-15 DIAGNOSIS — E78.1 HYPERTRIGLYCERIDEMIA: ICD-10-CM

## 2022-07-15 PROBLEM — Z01.818 PRE-OP EXAMINATION: Status: RESOLVED | Noted: 2022-02-24 | Resolved: 2022-07-15

## 2022-07-15 PROCEDURE — 99214 OFFICE O/P EST MOD 30 MIN: CPT | Performed by: FAMILY MEDICINE

## 2022-07-15 RX ORDER — ATORVASTATIN CALCIUM 20 MG/1
20 TABLET, FILM COATED ORAL DAILY
Qty: 30 TABLET | Refills: 5 | Status: SHIPPED | OUTPATIENT
Start: 2022-07-15

## 2022-07-15 NOTE — PATIENT INSTRUCTIONS
Patient will intensify her diet  Does not wish to add more medications at this point  Is concerned about side effects of metformin  Will increase atorvastatin from 10-20 mg  Repeat labs as close to 3 months as possible with office visit after  10% - bad control"> 10% - bad control,Hemoglobin A1c (HbA1c) greater than 10% indicating poor diabetic control,Haemoglobin A1c greater than 10% indicating poor diabetic control">   Diabetes Mellitus Type 2 in Adults, Ambulatory Care   GENERAL INFORMATION:   Diabetes mellitus type 2  is a disease that affects how your body uses glucose (sugar)  Insulin helps move sugar out of the blood so it can be used for energy  Normally, when the blood sugar level increases, the pancreas makes more insulin  Type 2 diabetes develops because either the body cannot make enough insulin, or it cannot use the insulin correctly  After many years, your pancreas may stop making insulin  Common symptoms include the following:   More hunger or thirst than usual     Frequent urination     Weight loss without trying     Blurred vision  Seek immediate care for the following symptoms:   Severe abdominal pain, or pain that spreads to your back  You may also be vomiting  Trouble staying awake or focusing    Shaking or sweating    Blurred or double vision    Breath has a fruity, sweet smell    Breathing is deep and labored, or rapid and shallow    Heartbeat is fast and weak  Treatment for diabetes mellitus type 2  includes keeping your blood sugar at a normal level  You must eat the right foods, and exercise regularly  You may also need medicine if you cannot control your blood sugar level with nutrition and exercise  Manage diabetes mellitus type 2:   Check your blood sugar level  You will be taught how to check a small drop of blood in a glucose monitor  Ask your healthcare provider when and how often to check during the day   Ask your healthcare provider what your blood sugar levels should be when you check them  Keep track of carbohydrates (sugar and starchy foods)  Your blood sugar level can get too high if you eat too many carbohydrates  Your dietitian will help you plan meals and snacks that have the right amount of carbohydrates  Eat low-fat foods  Some examples are skinless chicken and low-fat milk  Eat less sodium (salt)  Some examples of high-sodium foods to limit are soy sauce, potato chips, and soup  Do not add salt to food you cook  Limit your use of table salt  Eat high-fiber foods  Foods that are a good source of fiber include vegetables, whole grain bread, and beans  Limit alcohol  Alcohol affects your blood sugar level and can make it harder to manage your diabetes  Women should limit alcohol to 1 drink a day  Men should limit alcohol to 2 drinks a day  A drink of alcohol is 12 ounces of beer, 5 ounces of wine, or 1½ ounces of liquor  Get regular exercise  Exercise can help keep your blood sugar level steady, decrease your risk of heart disease, and help you lose weight  Exercise for at least 30 minutes, 5 days a week  Include muscle strengthening activities 2 days each week  Work with your healthcare provider to create an exercise plan  Check your feet each day  for injuries or open sores  Ask your healthcare provider for activities you can do if you have an open sore  Quit smoking  If you smoke, it is never too late to quit  Smoking can worsen the problems that may occur with diabetes  Ask your healthcare provider for information about how to stop smoking if you are having trouble quitting  Ask about your weight:  Ask healthcare providers if you need to lose weight, and how much to lose  Ask them to help you with a weight loss program  Even a 10 to 15 pound weight loss can help you manage your blood sugar level  Carry medical alert identification  Wear medical alert jewelry or carry a card that says you have diabetes   Ask your healthcare provider where to get these items  Ask about vaccines  Diabetes puts you at risk of serious illness if you get the flu, pneumonia, or hepatitis  Ask your healthcare provider if you should get a flu, pneumonia, or hepatitis B vaccine, and when to get the vaccine  Follow up with your healthcare provider as directed:  Write down your questions so you remember to ask them during your visits  CARE AGREEMENT:   You have the right to help plan your care  Learn about your health condition and how it may be treated  Discuss treatment options with your caregivers to decide what care you want to receive  You always have the right to refuse treatment  The above information is an  only  It is not intended as medical advice for individual conditions or treatments  Talk to your doctor, nurse or pharmacist before following any medical regimen to see if it is safe and effective for you  © 2014 [address] is for End User's use only and may not be sold, redistributed or otherwise used for commercial purposes  All illustrations and images included in CareNotes® are the copyrighted property of A D A M , Inc  or [employer]

## 2022-07-15 NOTE — PROGRESS NOTES
[id number]        NAME: Sharyn Clements is a 37 y o  female  : 1980    MRN: [id number]  DATE: July 15, 2022  TIME: 5:14 PM    Assessment and Plan   Type 2 diabetes mellitus without complication, without long-term current use of insulin ([address] ) [E11 9]  1  Type 2 diabetes mellitus without complication, without long-term current use of insulin (HCC)  Microalbumin / creatinine urine ratio    Hemoglobin A1C    Comprehensive metabolic panel    Lipid Panel with Direct LDL reflex    Hemoglobin A1C    Comprehensive metabolic panel    Lipid Panel with Direct LDL reflex    sitaGLIPtin (Januvia) 100 mg tablet    atorvastatin (LIPITOR) 20 mg tablet   2  Hyperlipidemia associated with type 2 diabetes mellitus (HCC)  Hemoglobin A1C    Comprehensive metabolic panel    Lipid Panel with Direct LDL reflex    Hemoglobin A1C    Comprehensive metabolic panel    Lipid Panel with Direct LDL reflex   3  Paroxysmal atrial fibrillation (HCC)     4  Hypertriglyceridemia         Paroxysmal atrial fibrillation ([address] )  Patient continues on flecainide  Also still takes metoprolol low dose  Followed by cardiology  Patient Instructions     Patient Instructions     Patient will intensify her diet  Does not wish to add more medications at this point  Is concerned about side effects of metformin  Will increase atorvastatin from 10-20 mg  Repeat labs as close to 3 months as possible with office visit after  10% - bad control"> 10% - bad control,Hemoglobin A1c (HbA1c) greater than 10% indicating poor diabetic control,Haemoglobin A1c greater than 10% indicating poor diabetic control">   Diabetes Mellitus Type 2 in Adults, Ambulatory Care   GENERAL INFORMATION:   Diabetes mellitus type 2  is a disease that affects how your body uses glucose (sugar)  Insulin helps move sugar out of the blood so it can be used for energy  Normally, when the blood sugar level increases, the pancreas makes more insulin   Type 2 diabetes develops because either the body cannot make enough insulin, or it cannot use the insulin correctly  After many years, your pancreas may stop making insulin  Common symptoms include the following:   · More hunger or thirst than usual     · Frequent urination     · Weight loss without trying     · Blurred vision  Seek immediate care for the following symptoms:   · Severe abdominal pain, or pain that spreads to your back  You may also be vomiting  · Trouble staying awake or focusing    · Shaking or sweating    · Blurred or double vision    · Breath has a fruity, sweet smell    · Breathing is deep and labored, or rapid and shallow    · Heartbeat is fast and weak  Treatment for diabetes mellitus type 2  includes keeping your blood sugar at a normal level  You must eat the right foods, and exercise regularly  You may also need medicine if you cannot control your blood sugar level with nutrition and exercise  Manage diabetes mellitus type 2:   · Check your blood sugar level  You will be taught how to check a small drop of blood in a glucose monitor  Ask your healthcare provider when and how often to check during the day  Ask your healthcare provider what your blood sugar levels should be when you check them  · Keep track of carbohydrates (sugar and starchy foods)  Your blood sugar level can get too high if you eat too many carbohydrates  Your dietitian will help you plan meals and snacks that have the right amount of carbohydrates  · Eat low-fat foods  Some examples are skinless chicken and low-fat milk  · Eat less sodium (salt)  Some examples of high-sodium foods to limit are soy sauce, potato chips, and soup  Do not add salt to food you cook  Limit your use of table salt  · Eat high-fiber foods  Foods that are a good source of fiber include vegetables, whole grain bread, and beans  · Limit alcohol    Alcohol affects your blood sugar level and can make it harder to manage your diabetes  Women should limit alcohol to 1 drink a day  Men should limit alcohol to 2 drinks a day  A drink of alcohol is 12 ounces of beer, 5 ounces of wine, or 1½ ounces of liquor  · Get regular exercise  Exercise can help keep your blood sugar level steady, decrease your risk of heart disease, and help you lose weight  Exercise for at least 30 minutes, 5 days a week  Include muscle strengthening activities 2 days each week  Work with your healthcare provider to create an exercise plan  · Check your feet each day  for injuries or open sores  Ask your healthcare provider for activities you can do if you have an open sore  · Quit smoking  If you smoke, it is never too late to quit  Smoking can worsen the problems that may occur with diabetes  Ask your healthcare provider for information about how to stop smoking if you are having trouble quitting  · Ask about your weight:  Ask healthcare providers if you need to lose weight, and how much to lose  Ask them to help you with a weight loss program  Even a 10 to 15 pound weight loss can help you manage your blood sugar level  · Carry medical alert identification  Wear medical alert jewelry or carry a card that says you have diabetes  Ask your healthcare provider where to get these items  · Ask about vaccines  Diabetes puts you at risk of serious illness if you get the flu, pneumonia, or hepatitis  Ask your healthcare provider if you should get a flu, pneumonia, or hepatitis B vaccine, and when to get the vaccine  Follow up with your healthcare provider as directed:  Write down your questions so you remember to ask them during your visits  CARE AGREEMENT:   You have the right to help plan your care  Learn about your health condition and how it may be treated  Discuss treatment options with your caregivers to decide what care you want to receive  You always have the right to refuse treatment  The above information is an  only   It is not intended as medical advice for individual conditions or treatments  Talk to your doctor, nurse or pharmacist before following any medical regimen to see if it is safe and effective for you  © 2014 [address] is for End User's use only and may not be sold, redistributed or otherwise used for commercial purposes  All illustrations and images included in CareNotes® are the copyrighted property of A D A M [employer] Inc  or [employer]  Chief Complaint     Chief Complaint   Patient presents with    Physical Exam         History of Present Illness       Patient comes in today to follow-up on her diabetes  Recent labs were reviewed  A1c markedly elevated  Patient admits she is been terrible with her diet  At this point due to other medical issues she refuses to take any new medication  Hyperlipidemia triglycerides are markedly elevated  Patient will agree to increase her dose of atorvastatin  Review of Systems   Review of Systems   Constitutional: Negative for activity change, appetite change, diaphoresis and fatigue  Respiratory: Negative for cough, chest tightness, shortness of breath and wheezing  Cardiovascular: Negative for chest pain, palpitations and leg swelling  Fast or slow heart rate   Gastrointestinal: Negative for abdominal pain, blood in stool, constipation, diarrhea, nausea and vomiting  Genitourinary: Negative for difficulty urinating, dysuria and frequency  Musculoskeletal: Negative for arthralgias, gait problem, joint swelling and myalgias  Neurological: Negative for dizziness, light-headedness and headaches  Psychiatric/Behavioral: Negative for agitation, confusion, dysphoric mood and sleep disturbance  The patient is not nervous/anxious            Current Medications       Current Outpatient Medications:     aspirin 81 MG tablet, Take 81 mg by mouth daily, Disp: , Rfl:     atorvastatin (LIPITOR) 20 mg tablet, Take 1 tablet (20 mg total) by mouth daily, Disp: 30 tablet, Rfl: 5    Blood Glucose Monitoring Suppl (ONE TOUCH ULTRA 2) w/Device KIT, USE TO TEST BLOOD SUGAR, Disp: 1 each, Rfl: 0    flecainide (TAMBOCOR) 100 mg tablet, , Disp: , Rfl:     glucose blood (OneTouch Ultra) test strip, Test once daily, Disp: 100 each, Rfl: 3    Lancets (OneTouch Delica Plus CYBYOR43Z) MISC, TEST 1-2 TIMES A DAY, Disp: 100 each, Rfl: 3    metoprolol succinate (TOPROL-XL) 25 mg 24 hr tablet, Take 25 mg by mouth daily, Disp: , Rfl:     sitaGLIPtin (Januvia) 100 mg tablet, Take 1 tablet (100 mg total) by mouth daily, Disp: 30 tablet, Rfl: 5    Current Allergies     Allergies as of 07/15/2022    (No Known Allergies)            The following portions of the patient's history were reviewed and updated as appropriate: allergies, current medications, past family history, past medical history, past social history, past surgical history and problem list      Past Medical History:   Diagnosis Date    Adnexal cyst     left    Elevated LFTs 6/1/2020    History of atrial fibrillation     ablation done, takes metoprolol    Irregular periods     Varicella        Past Surgical History:   Procedure Laterality Date    CARDIAC ELECTROPHYSIOLOGY STUDY AND ABLATION      CATARACT EXTRACTION      EYE SURGERY      OTHER SURGICAL HISTORY      catheter ablation atrial fibrillation    OVARIAN CYST SURGERY Right 2/2/2018    Procedure: LAPAROSCOPIC PARAOVARIAN  CYSTECTOMY;  Surgeon: Zara Hernandez MD;  [facility] OR;  Service: Gynecology    SKIN BIOPSY         Family History   Problem Relation Age of Onset    Heart disease Father     Hypertension Maternal Grandmother     Heart disease Family     Cancer Family     Thyroid disease Family     Substance Abuse Neg Hx     Mental illness Neg Hx          Medications have been verified          Objective   /82   Pulse 83   Resp 16   Ht 5' 2" (1 575 m)   Wt 82 6 kg (182 lb)   LMP 07/10/2022 (Exact Date)   SpO2 96%   BMI 33 29 kg/m²        Physical Exam     Physical Exam  Constitutional:       General: She is not in acute distress  Appearance: Normal appearance  She is not ill-appearing  HENT:      Head: Normocephalic and atraumatic  Nose: Nose normal    Eyes:      Extraocular Movements: Extraocular movements intact  Pupils: Pupils are equal, round, and reactive to light  Cardiovascular:      Rate and Rhythm: Normal rate and regular rhythm  Pulses: no weak pulses          Dorsalis pedis pulses are 2+ on the right side and 2+ on the left side  Posterior tibial pulses are 2+ on the right side and 2+ on the left side  Heart sounds: Normal heart sounds  Pulmonary:      Effort: Pulmonary effort is normal       Breath sounds: Normal breath sounds  Musculoskeletal:      Right lower leg: No edema  Left lower leg: No edema  Feet:      Right foot:      Skin integrity: No ulcer, skin breakdown, erythema, warmth, callus or dry skin  Left foot:      Skin integrity: No ulcer, skin breakdown, erythema, warmth, callus or dry skin  Neurological:      Mental Status: She is alert  Patient's shoes and socks removed  Right Foot/Ankle   Right Foot Inspection  Skin Exam: skin normal and skin intact  No dry skin, no warmth, no callus, no erythema, no maceration, no abnormal color, no pre-ulcer, no ulcer and no callus  Toe Exam: ROM and strength within normal limits  Sensory   Vibration: intact  Proprioception: intact  Monofilament testing: intact    Vascular  Capillary refills: < 3 seconds  The right DP pulse is 2+  The right PT pulse is 2+  Left Foot/Ankle  Left Foot Inspection  Skin Exam: skin normal and skin intact  No dry skin, no warmth, no erythema, no maceration, normal color, no pre-ulcer, no ulcer and no callus  Toe Exam: ROM and strength within normal limits       Sensory   Vibration: intact  Proprioception: intact  Monofilament testing: intact    Vascular  Capillary refills: < 3 seconds  The left DP pulse is 2+  The left PT pulse is 2+       Assign Risk Category  No deformity present  No loss of protective sensation  No weak pulses  Risk: 0

## 2022-07-16 LAB
ALBUMIN/CREAT UR: 15 MG/G CREAT (ref 0–29)
CREAT UR-MCNC: 64.5 MG/DL
MICROALBUMIN UR-MCNC: 9.5 UG/ML

## 2022-07-16 PROCEDURE — 3061F NEG MICROALBUMINURIA REV: CPT | Performed by: FAMILY MEDICINE

## 2022-08-22 LAB
LEFT EYE DIABETIC RETINOPATHY: NORMAL
RIGHT EYE DIABETIC RETINOPATHY: NORMAL

## 2022-08-22 PROCEDURE — 2023F DILAT RTA XM W/O RTNOPTHY: CPT | Performed by: FAMILY MEDICINE

## 2022-08-26 ENCOUNTER — OFFICE VISIT (OUTPATIENT)
Dept: FAMILY MEDICINE CLINIC | Facility: CLINIC | Age: 42
End: 2022-08-26
Payer: COMMERCIAL

## 2022-08-26 VITALS
HEART RATE: 70 BPM | RESPIRATION RATE: 16 BRPM | DIASTOLIC BLOOD PRESSURE: 74 MMHG | OXYGEN SATURATION: 92 % | BODY MASS INDEX: 33.13 KG/M2 | WEIGHT: 180 LBS | SYSTOLIC BLOOD PRESSURE: 107 MMHG | HEIGHT: 62 IN

## 2022-08-26 DIAGNOSIS — Z12.31 ENCOUNTER FOR SCREENING MAMMOGRAM FOR BREAST CANCER: ICD-10-CM

## 2022-08-26 DIAGNOSIS — Z00.00 ANNUAL PHYSICAL EXAM: Primary | ICD-10-CM

## 2022-08-26 DIAGNOSIS — E11.9 TYPE 2 DIABETES MELLITUS WITHOUT COMPLICATION, WITHOUT LONG-TERM CURRENT USE OF INSULIN (HCC): ICD-10-CM

## 2022-08-26 PROCEDURE — 99396 PREV VISIT EST AGE 40-64: CPT | Performed by: FAMILY MEDICINE

## 2022-08-26 NOTE — PATIENT INSTRUCTIONS
Order for mammogram provided  Get labs for diabetes in October with office visit after labs are done  Continue work on diet, exercise, weight loss  Wellness Visit for Adults   AMBULATORY CARE:   A wellness visit  is when you see your healthcare provider to get screened for health problems  Your healthcare provider will also give you advice on how to stay healthy  Write down your questions so you remember to ask them  Ask your healthcare provider how often you should have a wellness visit  What happens at a wellness visit:  Your healthcare provider will ask about your health, and your family history of health problems  This includes high blood pressure, heart disease, and cancer  He or she will ask if you have symptoms that concern you, if you smoke, and about your mood  You may also be asked about your intake of medicines, supplements, food, and alcohol  Any of the following may be done: Your weight  will be checked  Your height may also be checked so your body mass index (BMI) can be calculated  Your BMI shows if you are at a healthy weight  Your blood pressure  and heart rate will be checked  Your temperature may also be checked  Blood and urine tests  may be done  Blood tests may be done to check your cholesterol levels  Abnormal cholesterol levels increase your risk for heart disease and stroke  You may also need a blood or urine test to check for diabetes if you are at increased risk  Urine tests may be done to look for signs of an infection or kidney disease  A physical exam  includes checking your heartbeat and lungs with a stethoscope  Your healthcare provider may also check your skin to look for sun damage  Screening tests  may be recommended  A screening test is done to check for diseases that may not cause symptoms  The screening tests you may need depend on your age, gender, family history, and lifestyle habits   For example, colorectal screening may be recommended if you are 48years old Pt calling back  States she has an eyelash that is growing inward toward her eye  States she was referred to surgeon  Her eye doctor told her 9 times out of 10 people have sleep apnea  Reports that she wakes up a lot   States her fitbit tells her she 15-20 times a night  States she is always tired  States she was tested about 10 years ago and was told had borderline narcolepsy (states was at [facility])  Does snore at night  Explained will discuss with Dr. Perdomo when back in tomorrow and call her back  Ok to leave a detailed message when call her back    Dr. Perdomo- pt requesting referral for sleep study due to recommendation from her eye doctor and reports of fatigue, frequent waking at night and snores   or older  Screening tests you need if you are a woman:   A Pap smear  is used to screen for cervical cancer  Pap smears are usually done every 3 to 5 years depending on your age  You may need them more often if you have had abnormal Pap smear test results in the past  Ask your healthcare provider how often you should have a Pap smear  A mammogram  is an x-ray of your breasts to screen for breast cancer  Experts recommend mammograms every 2 years starting at age 48 years  You may need a mammogram at age 52 years or younger if you have an increased risk for breast cancer  Talk to your healthcare provider about when you should start having mammograms and how often you need them  Vaccines you may need:   Get an influenza vaccine  every year  The influenza vaccine protects you from the flu  Several types of viruses cause the flu  The viruses change over time, so new vaccines are made each year  Get a tetanus-diphtheria (Td) booster vaccine  every 10 years  This vaccine protects you against tetanus and diphtheria  Tetanus is a severe infection that may cause painful muscle spasms and lockjaw  Diphtheria is a severe bacterial infection that causes a thick covering in the back of your mouth and throat  Get a human papillomavirus (HPV) vaccine  if you are female and aged 23 to 32 or male 23 to 24 and never received it  This vaccine protects you from HPV infection  HPV is the most common infection spread by sexual contact  HPV may also cause vaginal, penile, and anal cancers  Get a pneumococcal vaccine  if you are aged 72 years or older  The pneumococcal vaccine is an injection given to protect you from pneumococcal disease  Pneumococcal disease is an infection caused by pneumococcal bacteria  The infection may cause pneumonia, meningitis, or an ear infection  Get a shingles vaccine  if you are 60 or older, even if you have had shingles before   The shingles vaccine is an injection to protect you from the varicella-zoster virus  This is the same virus that causes chickenpox  Shingles is a painful rash that develops in people who had chickenpox or have been exposed to the virus  How to eat healthy:  My Plate is a model for planning healthy meals  It shows the types and amounts of foods that should go on your plate  Fruits and vegetables make up about half of your plate, and grains and protein make up the other half  A serving of dairy is included on the side of your plate  The amount of calories and serving sizes you need depends on your age, gender, weight, and height  Examples of healthy foods are listed below:  Eat a variety of vegetables  such as dark green, red, and orange vegetables  You can also include canned vegetables low in sodium (salt) and frozen vegetables without added butter or sauces  Eat a variety of fresh fruits , canned fruit in 100% juice, frozen fruit, and dried fruit  Include whole grains  At least half of the grains you eat should be whole grains  Examples include whole-wheat bread, wheat pasta, brown rice, and whole-grain cereals such as oatmeal     Eat a variety of protein foods such as seafood (fish and shellfish), lean meat, and poultry without skin (turkey and chicken)  Examples of lean meats include pork leg, shoulder, or tenderloin, and beef round, sirloin, tenderloin, and extra lean ground beef  Other protein foods include eggs and egg substitutes, beans, peas, soy products, nuts, and seeds  Choose low-fat dairy products such as skim or 1% milk or low-fat yogurt, cheese, and cottage cheese  Limit unhealthy fats  such as butter, hard margarine, and shortening  Exercise:  Exercise at least 30 minutes per day on most days of the week  Some examples of exercise include walking, biking, dancing, and swimming  You can also fit in more physical activity by taking the stairs instead of the elevator or parking farther away from stores   Include muscle strengthening activities 2 days each week  Regular exercise provides many health benefits  It helps you manage your weight, and decreases your risk for type 2 diabetes, heart disease, stroke, and high blood pressure  Exercise can also help improve your mood  Ask your healthcare provider about the best exercise plan for you  General health and safety guidelines:   Do not smoke  Nicotine and other chemicals in cigarettes and cigars can cause lung damage  Ask your healthcare provider for information if you currently smoke and need help to quit  E-cigarettes or smokeless tobacco still contain nicotine  Talk to your healthcare provider before you use these products  Limit alcohol  A drink of alcohol is 12 ounces of beer, 5 ounces of wine, or 1½ ounces of liquor  Lose weight, if needed  Being overweight increases your risk of certain health conditions  These include heart disease, high blood pressure, type 2 diabetes, and certain types of cancer  Protect your skin  Do not sunbathe or use tanning beds  Use sunscreen with a SPF 15 or higher  Apply sunscreen at least 15 minutes before you go outside  Reapply sunscreen every 2 hours  Wear protective clothing, hats, and sunglasses when you are outside  Drive safely  Always wear your seatbelt  Make sure everyone in your car wears a seatbelt  A seatbelt can save your life if you are in an accident  Do not use your cell phone when you are driving  This could distract you and cause an accident  Pull over if you need to make a call or send a text message  Practice safe sex  Use latex condoms if are sexually active and have more than one partner  Your healthcare provider may recommend screening tests for sexually transmitted infections (STIs)  Wear helmets, lifejackets, and protective gear  Always wear a helmet when you ride a bike or motorcycle, go skiing, or play sports that could cause a head injury  Wear protective equipment when you play sports   Wear a jadiel when you are on a boat or doing water sports  © Copyright [employer] 2022 Information is for End User's use only and may not be sold, redistributed or otherwise used for commercial purposes  All illustrations and images included in CareNotes® are the copyrighted property of A D A M , Inc  or Brett Malone  The above information is an  only  It is not intended as medical advice for individual conditions or treatments  Talk to your doctor, nurse or pharmacist before following any medical regimen to see if it is safe and effective for you

## 2022-08-26 NOTE — PROGRESS NOTES
[address]    NAME: Fawn Marinelli  AGE: 40 y o  SEX: female  : 1980     DATE: 2022     Assessment and Plan:     Problem List Items Addressed This Visit        Endocrine    Type 2 diabetes mellitus without complication, without long-term current use of insulin ([facility] [address] )      Other Visit Diagnoses     Annual physical exam    -  Primary    Encounter for screening mammogram for breast cancer        Relevant Orders    Mammo screening bilateral w 3d & cad          Immunizations and preventive care screenings were discussed with patient today  Appropriate education was printed on patient's after visit summary  Counseling:  Alcohol/drug use: discussed moderation in alcohol intake, the recommendations for healthy alcohol use, and avoidance of illicit drug use  Dental Health: discussed importance of regular tooth brushing, flossing, and dental visits  · Exercise: the importance of regular exercise/physical activity was discussed  Recommend exercise 3-5 times per week for at least 30 minutes  BMI Counseling: Body mass index is 32 92 kg/m²  The BMI is above normal  Nutrition recommendations include decreasing portion sizes and moderation in carbohydrate intake  Exercise recommendations include moderate physical activity 150 minutes/week  No pharmacotherapy was ordered  Rationale for BMI follow-up plan is due to patient being overweight or obese  Return in about 3 months (around 2022)  Chief Complaint:     Chief Complaint   Patient presents with    Physical Exam     Seen 7/15/22 for DM check up- Has labs ordered for October-  follow up for DM visit after oct labs        History of Present Illness:     Adult Annual Physical   Patient here for a comprehensive physical exam  The patient reports see list     Diet and Physical Activity  · Diet/Nutrition: well balanced diet, limited junk food and consuming 3-5 servings of fruits/vegetables daily  · Exercise: 3-4 times a week on average  Depression Screening  PHQ-2/9 Depression Screening         General Health  · Sleep: sleeps well and gets 7-8 hours of sleep on average  · Hearing: normal - bilateral   · Vision: due to see Eye doctor  · Dental: regular dental visits and brushes teeth once daily  /GYN Health  · Patient is: premenopausal  · Last menstrual period: -  · Contraceptive method: abstinence  Review of Systems:     Review of Systems   Constitutional: Negative for activity change, appetite change, diaphoresis and fatigue  Respiratory: Negative for cough, chest tightness, shortness of breath and wheezing  Cardiovascular: Negative for chest pain, palpitations and leg swelling  Fast or slow heart rate   Gastrointestinal: Negative for abdominal pain, blood in stool, constipation, diarrhea, nausea and vomiting  Genitourinary: Negative for difficulty urinating, dysuria and frequency  Musculoskeletal: Negative for arthralgias, gait problem, joint swelling and myalgias  Neurological: Negative for dizziness, weakness, light-headedness, numbness and headaches  Psychiatric/Behavioral: Negative for agitation, confusion, dysphoric mood and sleep disturbance  The patient is not nervous/anxious         Past Medical History:     Past Medical History:   Diagnosis Date    Adnexal cyst     left    Elevated LFTs 6/1/2020    History of atrial fibrillation     ablation done, takes metoprolol    Irregular periods     Varicella       Past Surgical History:     Past Surgical History:   Procedure Laterality Date    CARDIAC ELECTROPHYSIOLOGY STUDY AND ABLATION      CATARACT EXTRACTION      EYE SURGERY      OTHER SURGICAL HISTORY      catheter ablation atrial fibrillation    OVARIAN CYST SURGERY Right 2/2/2018    Procedure: LAPAROSCOPIC PARAOVARIAN  CYSTECTOMY;  Surgeon: Kaitlyn Warren MD;  Location: [facility] OR;  Service: Gynecology    SKIN BIOPSY        Social History:     Social History     Socioeconomic History    Marital status: /Civil Union     Spouse name: Elliott    Number of children: 0    Years of education: None    Highest education level: None   Occupational History    Occupation:    Tobacco Use    Smoking status: Former Smoker     Packs/day: 1 50     Years: 14 00     Pack years: 21 00     Types: Cigarettes     Start date: 1996     Quit date: 01/2012     Years since quitting: 10 6    Smokeless tobacco: Never Used   Vaping Use    Vaping Use: Never used   Substance and Sexual Activity    Alcohol use: Yes     Comment: rare    Drug use: No    Sexual activity: Yes     Partners: Male     Birth control/protection: None   Other Topics Concern    None   Social History Narrative    Always uses seatbelt    Caffeine use     Social Determinants of Health     Financial Resource Strain: Not on file   Food Insecurity: Not on file   Transportation Needs: Not on file   Physical Activity: Not on file   Stress: Not on file   Social Connections: Not on file   Intimate Partner Violence: Not on file   Housing Stability: Not on file      Family History:     Family History   Problem Relation Age of Onset    Heart disease Father     Hypertension Maternal Grandmother     Heart disease Family     Cancer Family     Thyroid disease Family     Substance Abuse Neg Hx     Mental illness Neg Hx       Current Medications:     Current Outpatient Medications   Medication Sig Dispense Refill    aspirin 81 MG tablet Take 81 mg by mouth daily      atorvastatin (LIPITOR) 20 mg tablet Take 1 tablet (20 mg total) by mouth daily 30 tablet 5    Blood Glucose Monitoring Suppl (ONE TOUCH ULTRA 2) w/Device KIT USE TO TEST BLOOD SUGAR 1 each 0    flecainide (TAMBOCOR) 100 mg tablet       glucose blood (OneTouch Ultra) test strip Test once daily 100 each 3    Lancets (OneTouch Delica Plus RADHKM34G) MISC TEST 1-2 TIMES A  each 3    metoprolol succinate (TOPROL-XL) 25 mg 24 hr tablet Take 25 mg by mouth daily      sitaGLIPtin (Januvia) 100 mg tablet Take 1 tablet (100 mg total) by mouth daily 30 tablet 5     No current facility-administered medications for this visit  Allergies:     No Known Allergies   Physical Exam:     /74   Pulse 70   Resp 16   Ht 5' 2" (1 575 m)   Wt 81 6 kg (180 lb)   LMP 08/10/2022 (Exact Date)   SpO2 92%   BMI 32 92 kg/m²     Physical Exam  Vitals and nursing note reviewed  Constitutional:       General: She is not in acute distress  Appearance: She is well-developed  HENT:      Head: Normocephalic and atraumatic  Right Ear: External ear normal       Left Ear: External ear normal       Nose: Nose normal    Eyes:      General:         Right eye: No discharge  Left eye: No discharge  Extraocular Movements: Extraocular movements intact  Pupils: Pupils are equal, round, and reactive to light  Neck:      Thyroid: No thyromegaly  Cardiovascular:      Rate and Rhythm: Normal rate and regular rhythm  Heart sounds: Normal heart sounds  No murmur heard  Pulmonary:      Effort: Pulmonary effort is normal  No respiratory distress  Breath sounds: Normal breath sounds  No wheezing or rales  Abdominal:      General: Bowel sounds are normal       Palpations: Abdomen is soft  There is no mass  Tenderness: There is no abdominal tenderness  Musculoskeletal:         General: Normal range of motion  Cervical back: Normal range of motion and neck supple  Right lower leg: No edema  Left lower leg: No edema  Lymphadenopathy:      Cervical: No cervical adenopathy  Neurological:      Mental Status: She is alert and oriented to person, place, and time     Psychiatric:         Mood and Affect: Mood normal          Behavior: Behavior normal           Neto Nascimento MD  [id number]

## 2022-08-31 ENCOUNTER — TELEPHONE (OUTPATIENT)
Dept: DERMATOLOGY | Facility: CLINIC | Age: 42
End: 2022-08-31

## 2022-08-31 NOTE — TELEPHONE ENCOUNTER
Returned call to schedule est pt apt (), N/A, lm to return call for either  or Seth apt    Last ovs 3/10/21

## 2022-09-02 ENCOUNTER — TELEPHONE (OUTPATIENT)
Dept: DERMATOLOGY | Facility: CLINIC | Age: 42
End: 2022-09-02

## 2022-09-28 ENCOUNTER — OFFICE VISIT (OUTPATIENT)
Dept: DERMATOLOGY | Facility: CLINIC | Age: 42
End: 2022-09-28
Payer: COMMERCIAL

## 2022-09-28 VITALS — BODY MASS INDEX: 33.09 KG/M2 | HEIGHT: 62 IN | WEIGHT: 179.8 LBS | TEMPERATURE: 97.8 F

## 2022-09-28 DIAGNOSIS — D48.5 NEOPLASM OF UNCERTAIN BEHAVIOR OF SKIN: Primary | ICD-10-CM

## 2022-09-28 DIAGNOSIS — D17.22 LIPOMA OF LEFT UPPER EXTREMITY: ICD-10-CM

## 2022-09-28 PROCEDURE — 88342 IMHCHEM/IMCYTCHM 1ST ANTB: CPT | Performed by: STUDENT IN AN ORGANIZED HEALTH CARE EDUCATION/TRAINING PROGRAM

## 2022-09-28 PROCEDURE — 88341 IMHCHEM/IMCYTCHM EA ADD ANTB: CPT | Performed by: STUDENT IN AN ORGANIZED HEALTH CARE EDUCATION/TRAINING PROGRAM

## 2022-09-28 PROCEDURE — 88305 TISSUE EXAM BY PATHOLOGIST: CPT | Performed by: STUDENT IN AN ORGANIZED HEALTH CARE EDUCATION/TRAINING PROGRAM

## 2022-09-28 PROCEDURE — 11102 TANGNTL BX SKIN SINGLE LES: CPT | Performed by: DERMATOLOGY

## 2022-09-28 PROCEDURE — 99214 OFFICE O/P EST MOD 30 MIN: CPT | Performed by: DERMATOLOGY

## 2022-09-28 NOTE — PATIENT INSTRUCTIONS
INFORMED CONSENT DISCUSSION AND POST-OPERATIVE INSTRUCTIONS FOR PATIENT    I   RATIONALE FOR PROCEDURE  I understand that a skin biopsy allows the Dermatologist to test a lesion or rash under the microscope to obtain a diagnosis  It usually involves numbing the area with numbing medication and removing a small piece of skin; sometimes the area will be closed with sutures  In this specific procedure, sutures are not usually needed  If any sutures are placed, then they are usually need to be removed in 2 weeks or less  I understand that my Dermatologist recommends that a skin "shave" biopsy be performed today  A local anesthetic, similar to the kind that a dentist uses when filling a cavity, will be injected with a very small needle into the skin area to be sampled  The injected skin and tissue underneath "will go to sleep and become numb so no pain should be felt afterwards  An instrument shaped like a tiny "razor blade" (shave biopsy instrument) will be used to cut a small piece of tissue and skin from the area so that a sample of tissue can be taken and examined more closely under the microscope  A slight amount of bleeding will occur, but it will be stopped with direct pressure and a pressure bandage and any other appropriate methods  I understands that a scar will form where the wound was created  Surgical ointment will be applied to help protect the wound  Sutures are not usually needed      II   RISKS AND POTENTIAL COMPLICATIONS   I understand the risks and potential complications of a skin biopsy include but are not limited to the following:  Bleeding  Infection  Pain  Scar/keloid  Skin discoloration  Incomplete Removal  Recurrence  Nerve Damage/Numbness/Loss of Function  Allergic Reaction to Anesthesia  Biopsies are diagnostic procedures and based on findings additional treatment or evaluation may be required  Loss or destruction of specimen resulting in no additional findings    My Dermatologist has explained to me the nature of the condition, the nature of the procedure, and the benefits to be reasonably expected compared with alternative approaches  My Dermatologist has discussed the likelihood of major risks or complications of this procedure including the specific risks listed above, such as bleeding, infection, and scarring/keloid  I understand that a scar is expected after this procedure  I understand that my physician cannot predict if the scar will form a "keloid," which extends beyond the borders of the wound that is created  A keloid is a thick, painful, and bumpy scar  A keloid can be difficult to treat, as it does not always respond well to therapy, which includes injecting cortisone directly into the keloid every few weeks  While this usually reduces the pain and size of the scar, it does not eliminate it  I understand that photographs may be taken before and after the procedure  These will be maintained as part of the medical providers confidential records and may not be made available to me  I further authorize the medical provider to use the photographs for teaching purposes or to illustrate scientific papers, books, or lectures if in his/her judgment, medical research, education, or science may benefit from its use  I have had an opportunity to fully inquire about the risks and benefits of this procedure and its alternatives  I have been given ample time and opportunity to ask questions and to seek a second opinion if I wished to do so  I acknowledge that there have specifically been no guarantees as to the cosmetic results from the procedure  I am aware that with any procedure there is always the possibility of an unexpected complication  III  POST-PROCEDURAL CARE (WHAT YOU WILL NEED TO DO "AFTER THE BIOPSY" TO OPTIMIZE HEALING)    Keep the area clean and dry  Try NOT to remove the bandage or get it wet for the first 24 hours      Gently clean the area and apply surgical ointment (such as Vaseline petrolatum ointment, which is available "over the counter" and not a prescription) to the biopsy site for up to 2 weeks straight  This acts to protect the wound from the outside world  Sutures are not usually placed in this procedure  If any sutures were placed, return for suture removal as instructed (generally 1 week for the face, 2 weeks for the body)  Take Acetaminophen (Tylenol) for discomfort, if no contraindications  Ibuprofen or aspirin could make bleeding worse  Call our office immediately for signs of infection: fever, chills, increased redness, warmth, tenderness, discomfort/pain, or pus or foul smell coming from the wound  WHAT TO DO IF THERE IS ANY BLEEDING? If a small amount of bleeding is noticed, place a clean cloth over the area and apply firm pressure for ten minutes  Check the wound after 10 minutes of direct pressure  If bleeding persists, try one more time for an additional 10 minutes of direct pressure on the area  If the bleeding becomes heavier or does not stop after the second attempt, or if you have any other questions about this procedure, then please call your [facility] Dermatologist by calling [phone] (SKIN)  I hereby acknowledge that I have reviewed and verified the site with my Dermatologist and have requested and authorized my Dermatologist to proceed with the procedure

## 2022-09-28 NOTE — PROGRESS NOTES
[facility] Follow Up Note    Patient Name: Eyal Ramirez  Encounter Date: 09/28/22    Today's Chief Concerns:  Concern #1:  Spot of concern       Current Medications:    Current Outpatient Medications:     aspirin 81 MG tablet, Take 81 mg by mouth daily, Disp: , Rfl:     atorvastatin (LIPITOR) 20 mg tablet, Take 1 tablet (20 mg total) by mouth daily, Disp: 30 tablet, Rfl: 5    Blood Glucose Monitoring Suppl (ONE TOUCH ULTRA 2) w/Device KIT, USE TO TEST BLOOD SUGAR, Disp: 1 each, Rfl: 0    flecainide (TAMBOCOR) 100 mg tablet, , Disp: , Rfl:     glucose blood (OneTouch Ultra) test strip, Test once daily, Disp: 100 each, Rfl: 3    Lancets (OneTouch Delica Plus [id number]) MISC, TEST 1-2 TIMES A DAY, Disp: 100 each, Rfl: 3    metoprolol succinate (TOPROL-XL) 25 mg 24 hr tablet, Take 25 mg by mouth daily, Disp: , Rfl:     sitaGLIPtin (Januvia) 100 mg tablet, Take 1 tablet (100 mg total) by mouth daily, Disp: 30 tablet, Rfl: 5    CONSTITUTIONAL:   Vitals:    09/28/22 1413   Temp: 97 8 °F (36 6 °C)   TempSrc: Temporal   Weight: 81 6 kg (179 lb 12 8 oz)   Height: 5' 2" (1 575 m)       Specific Alerts:    Have you been seen by a [facility] Dermatologist in the last 3 years? No    Are you pregnant or planning to become pregnant? No    Are you currently or planning to be nursing or breast feeding? No    No Known Allergies    May we call your Preferred Phone number to discuss your specific medical information? YES    May we leave a detailed message that includes your specific medical information? YES    Have you traveled outside of the [facility] in the past 3 months? No    Do you currently have a pacemaker or defibrillator? No    Do you have any artificial heart valves, joints, plates, screws, rods, stents, pins, etc? No    Do you require any medications prior to a surgical procedure?  No    Are you taking any medications that cause you to bleed more easily ("blood thinners") No    Have you ever experienced a rapid heartbeat with epinephrine? No    Review of Systems:  Have you recently had or currently have any of the following? Fever or chills: No  Night Sweats: No  Headaches: No  Weight Gain: No  Weight Loss: No  Blurry Vision: No  Nausea: No  Vomiting: No  Diarrhea: No  Blood in Stool: No  Abdominal Pain: No  Itchy Skin: No  Painful Joints: No  Swollen Joints: No  Muscle Pain: No  Irregular Mole: No  Sun Burn: No  Dry Skin: No  Skin Color Changes: No  Scar or Keloid: No  Cold Sores/Fever Blisters: No  Bacterial Infections/MRSA: No  Anxiety: No  Depression: No  Suicidal or Homicidal Thoughts: No      PSYCH: Normal mood and affect  EYES: Normal conjunctiva  ENT: Normal lips and oral mucosa  CARDIOVASCULAR: No edema  RESPIRATORY: Normal respirations  HEME/LYMPH/IMMUNO:  No regional lymphadenopathy except as noted below in ASSESSMENT AND PLAN BY DIAGNOSIS    FULL ORGAN SYSTEM SKIN EXAM (SKIN)  Hair, Scalp, Ears, Face Normal except as noted below in Assessment   Neck, Cervical Chain Nodes Normal except as noted below in Assessment   Right Arm/Hand/Fingers Normal except as noted below in Assessment   Left Arm/Hand/Fingers Normal except as noted below in Assessment   Chest/Breasts/Axillae Viewed areas Normal except as noted below in Assessment   Abdomen, Umbilicus Normal except as noted below in Assessment   Back/Spine Normal except as noted below in Assessment   Groin/Genitalia/Buttocks Viewed areas Normal except as noted below in Assessment   Right Leg, Foot, Toes Normal except as noted below in Assessment   Left Leg, Foot, Toes Normal except as noted below in Assessment       NEOPLASM OF UNCERTAIN BEHAVIOR OF SKIN    Physical Exam:  (Anatomic Location); (Size and Morphological Description); (Differential Diagnosis):  Left interauricular ear; 0 4 cm loosent yellow papule; DDx sebaceous lesion versus BCC  Pertinent Positives:  Pertinent Negatives:     Additional History of Present Condition: Patient states the spot has been there for the past 6 months  Asymptomatic  Assessment and Plan:  I have discussed with the patient that a sample of skin via a "skin biopsy would be potentially helpful to further make a specific diagnosis under the microscope  Based on a thorough discussion of this condition and the management approach to it (including a comprehensive discussion of the known risks, side effects and potential benefits of treatment), the patient (family) agrees to implement the following specific plan:    Procedure:  Skin Biopsy  After a thorough discussion of treatment options and risk/benefits/alternatives (including but not limited to local pain, scarring, dyspigmentation, blistering, possible superinfection, and inability to confirm a diagnosis via histopathology), verbal and written consent were obtained and portion of the rash was biopsied for tissue sample  See below for consent that was obtained from patient and subsequent Procedure Note  PROCEDURE TANGENTIAL (SHAVE) BIOPSY NOTE:    Performing Physician: Dr Quintanilla  Anatomic Location; Clinical Description with size (cm); Pre-Op Diagnosis:      Left interauricular ear; 0 4 cm loosent yellow papule  Post-op diagnosis: Same     Local anesthesia: 2% Lidocaine  HCL     Topical anesthesia: None    Hemostasis: Aluminum chloride  and Other      After obtaining informed consent  at which time there was a discussion about the purpose of biopsy  and low risks of infection and bleeding  The area was prepped and draped in the usual fashion  Anesthesia was obtained with 1% lidocaine with epinephrine  A shave biopsy to an appropriate sampling depth was obtained by Shave (Dermablade or 15 blade) The resulting wound was covered with surgical ointment and bandaged appropriately  The patient tolerated the procedure well without complications and was without signs of functional compromise        Specimen has been sent for review by Dermatopathology  Standard post-procedure care has been explained and has been included in written form within the patient's copy of Informed Consent  INFORMED CONSENT DISCUSSION AND POST-OPERATIVE INSTRUCTIONS FOR PATIENT    I   RATIONALE FOR PROCEDURE  I understand that a skin biopsy allows the Dermatologist to test a lesion or rash under the microscope to obtain a diagnosis  It usually involves numbing the area with numbing medication and removing a small piece of skin; sometimes the area will be closed with sutures  In this specific procedure, sutures are not usually needed  If any sutures are placed, then they are usually need to be removed in 2 weeks or less  I understand that my Dermatologist recommends that a skin "shave" biopsy be performed today  A local anesthetic, similar to the kind that a dentist uses when filling a cavity, will be injected with a very small needle into the skin area to be sampled  The injected skin and tissue underneath "will go to sleep and become numb so no pain should be felt afterwards  An instrument shaped like a tiny "razor blade" (shave biopsy instrument) will be used to cut a small piece of tissue and skin from the area so that a sample of tissue can be taken and examined more closely under the microscope  A slight amount of bleeding will occur, but it will be stopped with direct pressure and a pressure bandage and any other appropriate methods  I understands that a scar will form where the wound was created  Surgical ointment will be applied to help protect the wound  Sutures are not usually needed      II   RISKS AND POTENTIAL COMPLICATIONS   I understand the risks and potential complications of a skin biopsy include but are not limited to the following:  Bleeding  Infection  Pain  Scar/keloid  Skin discoloration  Incomplete Removal  Recurrence  Nerve Damage/Numbness/Loss of Function  Allergic Reaction to Anesthesia  Biopsies are diagnostic procedures and based on findings additional treatment or evaluation may be required  Loss or destruction of specimen resulting in no additional findings    My Dermatologist has explained to me the nature of the condition, the nature of the procedure, and the benefits to be reasonably expected compared with alternative approaches  My Dermatologist has discussed the likelihood of major risks or complications of this procedure including the specific risks listed above, such as bleeding, infection, and scarring/keloid  I understand that a scar is expected after this procedure  I understand that my physician cannot predict if the scar will form a "keloid," which extends beyond the borders of the wound that is created  A keloid is a thick, painful, and bumpy scar  A keloid can be difficult to treat, as it does not always respond well to therapy, which includes injecting cortisone directly into the keloid every few weeks  While this usually reduces the pain and size of the scar, it does not eliminate it  I understand that photographs may be taken before and after the procedure  These will be maintained as part of the medical providers confidential records and may not be made available to me  I further authorize the medical provider to use the photographs for teaching purposes or to illustrate scientific papers, books, or lectures if in his/her judgment, medical research, education, or science may benefit from its use  I have had an opportunity to fully inquire about the risks and benefits of this procedure and its alternatives  I have been given ample time and opportunity to ask questions and to seek a second opinion if I wished to do so  I acknowledge that there have specifically been no guarantees as to the cosmetic results from the procedure  I am aware that with any procedure there is always the possibility of an unexpected complication  III   POST-PROCEDURAL CARE (WHAT YOU WILL NEED TO DO "AFTER THE BIOPSY" TO OPTIMIZE HEALING)    Keep the area clean and dry  Try NOT to remove the bandage or get it wet for the first 24 hours  Gently clean the area and apply surgical ointment (such as Vaseline petrolatum ointment, which is available "over the counter" and not a prescription) to the biopsy site for up to 2 weeks straight  This acts to protect the wound from the outside world  Sutures are not usually placed in this procedure  If any sutures were placed, return for suture removal as instructed (generally 1 week for the face, 2 weeks for the body)  Take Acetaminophen (Tylenol) for discomfort, if no contraindications  Ibuprofen or aspirin could make bleeding worse  Call our office immediately for signs of infection: fever, chills, increased redness, warmth, tenderness, discomfort/pain, or pus or foul smell coming from the wound  WHAT TO DO IF THERE IS ANY BLEEDING? If a small amount of bleeding is noticed, place a clean cloth over the area and apply firm pressure for ten minutes  Check the wound after 10 minutes of direct pressure  If bleeding persists, try one more time for an additional 10 minutes of direct pressure on the area  If the bleeding becomes heavier or does not stop after the second attempt, or if you have any other questions about this procedure, then please call your [facility]'s Dermatologist by calling [phone] (SKIN)  I hereby acknowledge that I have reviewed and verified the site with my Dermatologist and have requested and authorized my Dermatologist to proceed with the procedure  LIPOMA    Physical Exam:  Anatomic Location Affected:  Left Tricep   Morphological Description:  Subcutaneous tender nodule 2 cm   Pertinent Positives:  Pertinent Negatives: Additional History of Present Condition:  Patient states the spot has been there for the past 8 months  Patient states the spot is tender       Assessment and Plan:  Based on a thorough discussion of this condition and the management approach to it (including a comprehensive discussion of the known risks, side effects and potential benefits of treatment), the patient (family) agrees to implement the following specific plan:  Reassured benign   Scheduled an excision   Monitor for any changes     Lipoma  A lipoma is a non-cancerous tumor that is made up of fat cells  It slowly grows under the skin in the subcutaneous tissue  A person may have a single lipoma or may have many lipomas  They are very common  Lipomas can occur in people of all ages, however, they tend to develop in adulthood and are most noticeable during middle age  They affect both sexes equally, although solitary lipomas are more common in women whilst multiple lipomas occur more frequently in men  The cause of lipomas is unknown  It is possible there may be genetic involvement as many patients with lipomas come from a family with a history of these tumors  Sometimes an injury such as a blunt blow to part of the body may trigger growth of a lipoma  People are often unaware of lipomas until they have grown large enough to become visible and palpable  This growth occurs slowly over several years  Some features of lipomas include:  A dome-shaped or egg-shaped lump about 2-10 cm in diameter (some may grow even larger)   It feels soft and smooth and is easily moved under the skin with the fingers   Some have a rubbery or doughy consistency   They are most common on the shoulders, neck, trunk and arms, but they can occur anywhere on the body where fat tissue is present  Most lipomas are symptomless, but some are painful on applying pressure  Lipomas that are tender or painful are usually angiolipomas  This means the lipoma has an increased number of small blood vessels  Painful lipomas are also a feature of adiposis dolorosa or Dercum disease  Diagnosis of lipoma is usually made clinically by finding a soft lump under the skin   However, if there is any doubt, a deep skin biopsy can be performed which will show typical histopathological features of lipoma and its variants  Most lipomas require no treatment  Most lipomas eventually stop growing and remain indefinitely without causing any problems   Occasionally, lipomas that interfere with the movement of adjacent muscles may require surgical removal  Several methods are available:  Simple surgical excision   Squeeze technique (a small incision is made over the lipoma and the fatty tissue is squeezed through the hole)   Liposuction    Scribe Attestation    I,:  Shani Morris am acting as a scribe while in the presence of the attending physician :       I,:  Celia Samano MD personally performed the services described in this documentation    as scribed in my presence :

## 2022-09-30 ENCOUNTER — OFFICE VISIT (OUTPATIENT)
Dept: FAMILY MEDICINE CLINIC | Facility: CLINIC | Age: 42
End: 2022-09-30
Payer: COMMERCIAL

## 2022-09-30 VITALS
OXYGEN SATURATION: 93 % | HEIGHT: 62 IN | WEIGHT: 181 LBS | SYSTOLIC BLOOD PRESSURE: 127 MMHG | DIASTOLIC BLOOD PRESSURE: 86 MMHG | HEART RATE: 84 BPM | BODY MASS INDEX: 33.31 KG/M2

## 2022-09-30 DIAGNOSIS — J02.9 PHARYNGITIS, UNSPECIFIED ETIOLOGY: Primary | ICD-10-CM

## 2022-09-30 PROCEDURE — 99213 OFFICE O/P EST LOW 20 MIN: CPT | Performed by: NURSE PRACTITIONER

## 2022-09-30 PROCEDURE — 3725F SCREEN DEPRESSION PERFORMED: CPT | Performed by: NURSE PRACTITIONER

## 2022-09-30 NOTE — PATIENT INSTRUCTIONS
Discussed viral vs bacterial infection  Continue OTC cough/cold medications as directed  Gargle with warn salt water 2-3 times a day  Call or return for problems/concerns

## 2022-09-30 NOTE — PROGRESS NOTES
[address]  [facility]        NAME: Nora Grayson is a 40 y o  female  : 1980    MRN: [id number]  DATE: 2022  TIME: 3:15 PM    Assessment and Plan   Pharyngitis, unspecified etiology [J02 9]  1  Pharyngitis, unspecified etiology           Patient Instructions     Patient Instructions   Discussed viral vs bacterial infection  Continue OTC cough/cold medications as directed  Gargle with warn salt water 2-3 times a day  Call or return for problems/concerns          Chief Complaint     Chief Complaint   Patient presents with    Sore Throat     Since last night, body aches  History of Present Illness       Sore throat started last night felt some pain in left ear, body aches  Denies fever  Used Nyquil helped her sleep  Did not do COVID test  Vaccinated  Review of Systems   Review of Systems   Constitutional: Negative  Negative for fever  HENT: Positive for ear pain (right ear), sinus pressure and sore throat  Negative for congestion  Eyes: Negative  Respiratory: Negative for cough and shortness of breath  Cardiovascular: Negative for chest pain, palpitations and leg swelling  Gastrointestinal: Negative for nausea  Musculoskeletal: Negative  Skin: Negative  Negative for color change and rash  Allergic/Immunologic: Negative  Neurological: Negative  Negative for headaches  Hematological: Negative  Psychiatric/Behavioral: Negative  Negative for dysphoric mood           Current Medications       Current Outpatient Medications:     aspirin 81 MG tablet, Take 81 mg by mouth daily, Disp: , Rfl:     atorvastatin (LIPITOR) 20 mg tablet, Take 1 tablet (20 mg total) by mouth daily, Disp: 30 tablet, Rfl: 5    Blood Glucose Monitoring Suppl (ONE TOUCH ULTRA 2) w/Device KIT, USE TO TEST BLOOD SUGAR, Disp: 1 each, Rfl: 0    flecainide (TAMBOCOR) 100 mg tablet, , Disp: , Rfl:     glucose blood (OneTouch Ultra) test strip, Test once daily, Disp: 100 each, Rfl: 3    Lancets (OneTouch Delica Plus [id number]) MISC, TEST 1-2 TIMES A DAY, Disp: 100 each, Rfl: 3    metoprolol succinate (TOPROL-XL) 25 mg 24 hr tablet, Take 25 mg by mouth daily, Disp: , Rfl:     sitaGLIPtin (Januvia) 100 mg tablet, Take 1 tablet (100 mg total) by mouth daily, Disp: 30 tablet, Rfl: 5    Current Allergies     Allergies as of 09/30/2022    (No Known Allergies)            The following portions of the patient's history were reviewed and updated as appropriate: allergies, current medications, past family history, past medical history, past social history, past surgical history and problem list      Past Medical History:   Diagnosis Date    Adnexal cyst     left    Elevated LFTs 6/1/2020    History of atrial fibrillation     ablation done, takes metoprolol    Irregular periods     Varicella        Past Surgical History:   Procedure Laterality Date    CARDIAC ELECTROPHYSIOLOGY STUDY AND ABLATION      CATARACT EXTRACTION      EYE SURGERY      OTHER SURGICAL HISTORY      catheter ablation atrial fibrillation    OVARIAN CYST SURGERY Right 2/2/2018    Procedure: LAPAROSCOPIC PARAOVARIAN  CYSTECTOMY;  Surgeon: Kaitlyn Warren MD;  Location: [facility];  Service: Gynecology    SKIN BIOPSY         Family History   Problem Relation Age of Onset    Heart disease Father     Hypertension Maternal Grandmother     Heart disease Family     Cancer Family     Thyroid disease Family     Substance Abuse Neg Hx     Mental illness Neg Hx          Medications have been verified  Objective   /86 (BP Location: Left arm, Patient Position: Sitting, Cuff Size: Adult)   Pulse 84   Ht 5' 2" (1 575 m)   Wt 82 1 kg (181 lb)   LMP 09/10/2022 (Approximate)   SpO2 93%   BMI 33 11 kg/m²        Physical Exam     Physical Exam  Vitals and nursing note reviewed  Constitutional:       Appearance: She is obese  HENT:      Head: Normocephalic and atraumatic        Right Ear: No swelling or tenderness  Tympanic membrane is not erythematous  Left Ear: No swelling or tenderness  Ears:      Comments: Fluid in B/L TM     Nose: Congestion present  Mouth/Throat:      Mouth: Mucous membranes are moist  No oral lesions  Pharynx: Oropharynx is clear  Uvula midline  Posterior oropharyngeal erythema present  No oropharyngeal exudate or uvula swelling  Tonsils: No tonsillar exudate or tonsillar abscesses  Eyes:      Conjunctiva/sclera: Conjunctivae normal    Neck:      Thyroid: No thyromegaly  Cardiovascular:      Rate and Rhythm: Normal rate and regular rhythm  Heart sounds: Normal heart sounds  No murmur heard  No friction rub  No gallop  Pulmonary:      Effort: Pulmonary effort is normal  No respiratory distress  Breath sounds: No stridor  No wheezing, rhonchi or rales  Chest:      Chest wall: No tenderness  Abdominal:      General: Bowel sounds are normal       Palpations: Abdomen is soft  Musculoskeletal:      Cervical back: Normal range of motion  Lymphadenopathy:      Cervical: No cervical adenopathy  Skin:     General: Skin is warm and dry  Capillary Refill: Capillary refill takes less than 2 seconds  Coloration: Skin is not pale  Findings: No erythema or rash  Neurological:      General: No focal deficit present  Mental Status: She is alert and oriented to person, place, and time     Psychiatric:         Mood and Affect: Mood normal          Behavior: Behavior normal        PHQ-2/9 Depression Screening    Little interest or pleasure in doing things: 0 - not at all  Feeling down, depressed, or hopeless: 0 - not at all  PHQ-2 Score: 0  PHQ-2 Interpretation: Negative depression screen

## 2022-10-11 DIAGNOSIS — C44.219 BASAL CELL CARCINOMA (BCC) OF SKIN OF LEFT EAR: Primary | ICD-10-CM

## 2022-10-11 NOTE — RESULT ENCOUNTER NOTE
DERMATOPATHOLOGY RESULT NOTE    Results reviewed by ordering physician  Called patient to personally discuss results  Discussed results with patient         Instructions for Clinical Derm Team:   (remember to route Result Note to appropriate staff):    Call patient and schedule for MOHS    Result & Plan by Specimen:    Specimen A: malignant  Plan: [facility]

## 2022-10-17 LAB
ALBUMIN SERPL-MCNC: 4.2 G/DL (ref 3.8–4.8)
ALBUMIN/GLOB SERPL: 2 {RATIO} (ref 1.2–2.2)
ALP SERPL-CCNC: 103 IU/L (ref 44–121)
ALT SERPL-CCNC: 56 IU/L (ref 0–32)
AST SERPL-CCNC: 25 IU/L (ref 0–40)
BILIRUB SERPL-MCNC: 1.8 MG/DL (ref 0–1.2)
BUN SERPL-MCNC: 9 MG/DL (ref 6–24)
BUN/CREAT SERPL: 16 (ref 9–23)
CALCIUM SERPL-MCNC: 9.4 MG/DL (ref 8.7–10.2)
CHLORIDE SERPL-SCNC: 99 MMOL/L (ref 96–106)
CHOLEST SERPL-MCNC: 164 MG/DL (ref 100–199)
CO2 SERPL-SCNC: 26 MMOL/L (ref 20–29)
CREAT SERPL-MCNC: 0.57 MG/DL (ref 0.57–1)
EGFR: 117 ML/MIN/1.73
EST. AVERAGE GLUCOSE BLD GHB EST-MCNC: 260 MG/DL
GLOBULIN SER-MCNC: 2.1 G/DL (ref 1.5–4.5)
GLUCOSE SERPL-MCNC: 284 MG/DL (ref 70–99)
HBA1C MFR BLD: 10.7 % (ref 4.8–5.6)
HDLC SERPL-MCNC: 31 MG/DL
LDLC SERPL CALC-MCNC: 41 MG/DL (ref 0–99)
LDLC/HDLC SERPL: 1.3 RATIO (ref 0–3.2)
POTASSIUM SERPL-SCNC: 4.2 MMOL/L (ref 3.5–5.2)
PROT SERPL-MCNC: 6.3 G/DL (ref 6–8.5)
SL AMB VLDL CHOLESTEROL CALC: 92 MG/DL (ref 5–40)
SODIUM SERPL-SCNC: 139 MMOL/L (ref 134–144)
TRIGL SERPL-MCNC: 653 MG/DL (ref 0–149)

## 2022-10-20 NOTE — RESULT ENCOUNTER NOTE
Call patient with lab result-sugars are still very high  Should schedule appointment within the next 2 weeks

## 2022-10-21 ENCOUNTER — TELEPHONE (OUTPATIENT)
Dept: FAMILY MEDICINE CLINIC | Facility: CLINIC | Age: 42
End: 2022-10-21

## 2022-10-21 NOTE — TELEPHONE ENCOUNTER
----- Message from Aquilino Nj MD sent at 10/20/2022 12:41 PM EDT -----  Call patient with lab result-sugars are still very high  Should schedule appointment within the next 2 weeks          LVM FOR PT TO CALL BACK FOR LAB RESULTS

## 2022-10-27 ENCOUNTER — PROCEDURE VISIT (OUTPATIENT)
Dept: DERMATOLOGY | Facility: CLINIC | Age: 42
End: 2022-10-27

## 2022-10-27 VITALS — WEIGHT: 176 LBS | BODY MASS INDEX: 32.19 KG/M2

## 2022-10-27 DIAGNOSIS — D17.22 LIPOMA OF LEFT UPPER EXTREMITY: Primary | ICD-10-CM

## 2022-10-27 NOTE — PATIENT INSTRUCTIONS
PROCEDURE:  EXCISION WITH INTERMEDIATE LAYERED CLOSURE     Attending: Caro Martinez  Assistant: Dianna Medel      POSTOP DISCUSSION DISCUSSION AND INSTRUCTIONS FOR PATIENT      Rationale for Procedure  A skin excision allows the dermatologist to remove a lesion  The procedure involves a local numbing medication and removing the entire lesion  Typically, the lesion is being removed because it is atypical, traumatized, or for significant appearance reasons  The area will be open like a brush burn and allowed to heal    There will be no sutures  Tissue is sent to pathologist who will reconfirm diagnosis and verify completeness of lesion removal     Description of Procedure  We would like to perform a skin excision today  A local anesthetic, similar to the kind that a dentist uses when filling a cavity, will be injected with a very small needle into the skin area to be sampled  The injected skin and tissue underneath should “go to sleep” and become numbed so that no further pain should be felt  A scalpel will be used to cut around the lesion and tissue will be submitted to pathology for examination  Depending on the diagnosis the lesion will be excised with a certain amount of normal skin to help assure completeness of lesion removal   The physician will discuss in advance the anticipated size and extent of removal    Bleeding will occur, but it will stopped with direct pressure, sutures, and electrocautery  Surgical “Vaseline-type” ointment will also applied after the procedure to help create a barrier between the wound and the outside world  Risks and Potential Complications  The advantage of a skin excision is that it allows us to remove a problem lesion quickly    Although this usually permits the lesion to heal as soon as possible with the least scarring, there are some risks and potential complications that include but are not limited to the following:  Some bleeding is normal at time of procedure and some bleeding on gauze is normal  the first few days after surgery  Profuse bleeding and bleeding with swelling and pain should be reported as detailed  below  Infection is uncommon in skin surgery  Infection should be reported and is indicated by pain, redness, and discharge of purulent material   Some dull to at time sharp pain could occur initially the day after surgery  Persistent pain or increasing pain days after surgery is not expected and should be reported  Every effort is made to minimize scar, but location, size, and genetics do play a role in scar appearance  A surgical wound does not achieve its optimal appearance until 6 months  There are several treatments available if scarring would be problematic including scar creams, silicone pad, laser and scar revision  Skin discoloration can occur especially in people of color  Its important to avoid sun on wound in first 6 months after surgery  Treatment is available if pigment is problematic  Incomplete removal of the lesion or recurrence of lesion can occur and this would then require further treatment and more surgery  Nerve Damage/Numbness/Loss of Function is very rare, but is most likely to occur if lesion is large or if it is in a high risk location  Allergic Reaction to lidocaine is rare  More commonly,  epinephrine is used  with the lidocaine  Occasionally, epinephrine (aka adrenalin) may cause a brief  feeling of anxiety or jitteriness  The person at the microscope  (pathologist) may provide additional information that was unexpected  This unexpected finding could provoke the need for additional treatment or evaluation  What You Will Need to Do After the Procedure  Keep the area clean and dry the first day  Try NOT to remove the bandage for the first day  Gently clean the area with soap and water and apply Vaseline ointment (this is over the counter and not a prescription) to the excision  site for up to 2 weeks      Apply a clean appropriately sized bandage to area  Gauze and paper tape are recommended for sensitive skin  Return for suture removal as instructed (generally 1 week for the face, 2 weeks for the body)  Take Acetaminophen (Tylenol) for discomfort, if no contraindications  Do NOT take Ibuprofen or aspirin unless specifically told to do so by your Dermatologist because these medications can make bleeding worse  Call our office immediately for signs of infection: fever, chills, increased redness, warmth, tenderness, discomfort/pain, or pus or foul smell coming from the wound  If bleeding is noticed, place a clean cloth over the area and apply firm pressure for thirty minutes  Check the wound ONLY after 30 minutes of direct pressure; do not cheat and sneak a peak, as that does not count  If bleeding persists after 30 minutes of legitimate direct pressure, then try one more round of direct pressure for an additional 10 minutes to the area  Should the bleeding become heavier or not stop after the second attempt, call [facility] directly at [phone] (SKIN) or, if after hours, go to your local Emergency Room/Emergency Department

## 2022-10-27 NOTE — PROGRESS NOTES
PROCEDURE:  EXCISION WITH INTERMEDIATE LAYERED CLOSURE     Attending: Ben Baptiste  Assistant: Aimee Jimenez    Pre-Op Diagnosis: Lipoma  Post-Op Diagnosis: Same         Lesion Anatomic Location: Left   Pre-op size: 2 0 cm x 2 2 cm  Size of defect:  2 0 cm x 2 2 cm  Final repaired wound length:  2 cm    Written and verbal, witnessed informed consent was obtained  I discussed that excision is a method of removing lesions both benign and malignant lesions  A portion of normal skin is often taken to ensure completeness of removal   I reviewed that procedure will include numbing the area,  cutting around and under defect, undermining tissue, and closing the wound with sutures both inside and out  These sutures are usually removed in 7 to 14 days  Risks (bleeding, pain, infection, scarring, recurrence) and benefits discussed  It was discussed with patient that every effort is made to minimize scar, but scarring is influenced also by extrinsic factor such as location, age and genetics  Time Out: performed:  no  Correct patient: no  LOCAL ANESTHESIA: 1% xylocaine with epi     DESCRIPTION OF PROCEDURE: The patient was brought back into the procedure room, anesthetized locally, prepped and draped in the usual fashion  Using a #15 blade with a scalpel, the lesion was incised and then dissected out  Minimal bleeding  The wound was closed with subcutaneous sutures as follows:    Deep suture:3-0 Vicryl      Epidermal edge closure was accomplished with superficial sutures as follows:    Superficial suture: 4-0 Ethilon  Superficial suture type: horitzontal interrupted    Estimated blood loss less than 3ml  The patient tolerated the procedure well without any complications  The wound was cleaned with sterile saline, dried off, surgical vaseline ointment was applied, and the wound was covered  A pressure dressing was applied for stabilization and light pressure   The patient was given detailed oral and written instructions on postoperative care as detailed in consent  The patient left in good medical condition  POSTOP DISCUSSION DISCUSSION AND INSTRUCTIONS FOR PATIENT      Rationale for Procedure  A skin excision allows the dermatologist to remove a lesion  The procedure involves a local numbing medication and removing the entire lesion  Typically, the lesion is being removed because it is atypical, traumatized, or for significant appearance reasons  The area will be open like a brush burn and allowed to heal    There will be no sutures  Tissue is sent to pathologist who will reconfirm diagnosis and verify completeness of lesion removal     Description of Procedure  We would like to perform a skin excision today  A local anesthetic, similar to the kind that a dentist uses when filling a cavity, will be injected with a very small needle into the skin area to be sampled  The injected skin and tissue underneath should “go to sleep” and become numbed so that no further pain should be felt  A scalpel will be used to cut around the lesion and tissue will be submitted to pathology for examination  Depending on the diagnosis the lesion will be excised with a certain amount of normal skin to help assure completeness of lesion removal   The physician will discuss in advance the anticipated size and extent of removal    Bleeding will occur, but it will stopped with direct pressure, sutures, and electrocautery  Surgical “Vaseline-type” ointment will also applied after the procedure to help create a barrier between the wound and the outside world  Risks and Potential Complications  The advantage of a skin excision is that it allows us to remove a problem lesion quickly    Although this usually permits the lesion to heal as soon as possible with the least scarring, there are some risks and potential complications that include but are not limited to the following:  - Some bleeding is normal at time of procedure and some bleeding on gauze is normal  the first few days after surgery  Profuse bleeding and bleeding with swelling and pain should be reported as detailed  below  - Infection is uncommon in skin surgery  Infection should be reported and is indicated by pain, redness, and discharge of purulent material   - Some dull to at time sharp pain could occur initially the day after surgery  Persistent pain or increasing pain days after surgery is not expected and should be reported  - Every effort is made to minimize scar, but location, size, and genetics do play a role in scar appearance  A surgical wound does not achieve its optimal appearance until 6 months  There are several treatments available if scarring would be problematic including scar creams, silicone pad, laser and scar revision   - Skin discoloration can occur especially in people of color  Its important to avoid sun on wound in first 6 months after surgery  Treatment is available if pigment is problematic   - Incomplete removal of the lesion or recurrence of lesion can occur and this would then require further treatment and more surgery   - Nerve Damage/Numbness/Loss of Function is very rare, but is most likely to occur if lesion is large or if it is in a high risk location  - Allergic Reaction to lidocaine is rare  More commonly,  epinephrine is used  with the lidocaine  Occasionally, epinephrine (aka adrenalin) may cause a brief  feeling of anxiety or jitteriness  - The person at the microscope  (pathologist) may provide additional information that was unexpected  This unexpected finding could provoke the need for additional treatment or evaluation  What You Will Need to Do After the Procedure  1  Keep the area clean and dry the first day  Try NOT to remove the bandage for the first day  2  Gently clean the area with soap and water and apply Vaseline ointment (this is over the counter and not a prescription) to the excision  site for up to 2 weeks  3  Apply a clean appropriately sized bandage to area  Gauze and paper tape are recommended for sensitive skin  4  Return for suture removal as instructed (generally 1 week for the face, 2 weeks for the body)  5  Take Acetaminophen (Tylenol) for discomfort, if no contraindications  Do NOT take Ibuprofen or aspirin unless specifically told to do so by your Dermatologist because these medications can make bleeding worse  6  Call our office immediately for signs of infection: fever, chills, increased redness, warmth, tenderness, discomfort/pain, or pus or foul smell coming from the wound  If bleeding is noticed, place a clean cloth over the area and apply firm pressure for thirty minutes  Check the wound ONLY after 30 minutes of direct pressure; do not cheat and sneak a peak, as that does not count  If bleeding persists after 30 minutes of legitimate direct pressure, then try one more round of direct pressure for an additional 10 minutes to the area  Should the bleeding become heavier or not stop after the second attempt, call [facility] directly at [phone] (SKIN) or, if after hours, go to your local Emergency Room/Emergency Department        Scribe Attestation    I,:  Mary Wilson MA am acting as a scribe while in the presence of the attending physician :       I,:  Daniel Garcias MD personally performed the services described in this documentation    as scribed in my presence :

## 2022-10-28 ENCOUNTER — TELEPHONE (OUTPATIENT)
Dept: DERMATOLOGY | Facility: CLINIC | Age: 42
End: 2022-10-28

## 2022-10-28 DIAGNOSIS — C44.219 BASAL CELL CARCINOMA (BCC) OF SKIN OF LEFT EAR: Primary | ICD-10-CM

## 2022-10-28 NOTE — TELEPHONE ENCOUNTER
Pre- operative Mohs Telephone Scheduling Note    Do you have a pacemaker or defibrillator? no    Do you take antibiotics before skin or dental procedures? no  If yes, will likely require pre-operative antibiotics  Ask  the patient why they take the antibiotics (usually because of joint replacement)  Do you have a history of a joint replacements within the past 2 years? no   If yes, will likely require pre-operative antibiotics  Ask if orthopaedic surgeon has prescribed pre-operative antibiotics to take before procedures/dental work? Do you take any OTC medications that thin your blood (Aspirin, Aleve, Ibuprofen) or supplements that thin your blood (fish oil, garlic, vitamin E, Ginko Biloba)? yes: ASA 81 mg    Do you take any prescribed medications that thin your blood (Coumadin, Plavix, Xarelto, Eliquis or another prescribed blood thinner)? no    Do you have an allergy to lidocaine or epinephrine? no    Do you have an allergy to shellfish? no    Do you smoke? no      If yes,  patient to try and stop 2 days before surgery and 7 days after the surgery  Minimizing smoking as much as possible during this time will improve healing and the cosmetic result after surgery  Date scheduled: 11/22/22 at 10:30 Dr Fab[facility]    Coordination of Care with other provider (Oculoplastics, Plastics, ENT) required? no   IF YES, PLEASE FORWARD TO APPROPRIATE PERSONNEL TO HELP COORDINATE  Are there remaining tumors to be scheduled? no    Was Prior Authorization obtained?  No

## 2022-11-04 ENCOUNTER — OFFICE VISIT (OUTPATIENT)
Dept: FAMILY MEDICINE CLINIC | Facility: CLINIC | Age: 42
End: 2022-11-04

## 2022-11-04 VITALS
SYSTOLIC BLOOD PRESSURE: 132 MMHG | DIASTOLIC BLOOD PRESSURE: 74 MMHG | OXYGEN SATURATION: 98 % | WEIGHT: 175.6 LBS | HEIGHT: 62 IN | BODY MASS INDEX: 32.31 KG/M2 | HEART RATE: 76 BPM | RESPIRATION RATE: 16 BRPM

## 2022-11-04 DIAGNOSIS — E78.5 HYPERLIPIDEMIA ASSOCIATED WITH TYPE 2 DIABETES MELLITUS (HCC): ICD-10-CM

## 2022-11-04 DIAGNOSIS — I48.0 PAROXYSMAL ATRIAL FIBRILLATION (HCC): ICD-10-CM

## 2022-11-04 DIAGNOSIS — E11.9 TYPE 2 DIABETES MELLITUS WITHOUT COMPLICATION, WITHOUT LONG-TERM CURRENT USE OF INSULIN (HCC): Primary | ICD-10-CM

## 2022-11-04 DIAGNOSIS — Z23 NEED FOR INFLUENZA VACCINATION: ICD-10-CM

## 2022-11-04 DIAGNOSIS — E78.1 HYPERTRIGLYCERIDEMIA: ICD-10-CM

## 2022-11-04 DIAGNOSIS — E11.69 HYPERLIPIDEMIA ASSOCIATED WITH TYPE 2 DIABETES MELLITUS (HCC): ICD-10-CM

## 2022-11-04 RX ORDER — GLIPIZIDE 5 MG/1
5 TABLET, FILM COATED, EXTENDED RELEASE ORAL DAILY
Qty: 30 TABLET | Refills: 5 | Status: SHIPPED | OUTPATIENT
Start: 2022-11-04

## 2022-11-04 NOTE — PATIENT INSTRUCTIONS
Add glipizide 5 mg once daily  Continue with diet andGOLO supplement  Follow-up with dermatologist as advised  Repeat labs in 3 months  Patient would consider endocrinology consultation, however with her reluctance to take certain medications I am not sure what they would have to offer besides diabetic teaching which she also does not wish to pursue at this time  Type 2 Diabetes Management for Adults   AMBULATORY CARE:   Type 2 diabetes  is a disease that affects how your body uses glucose (sugar)  Either your body cannot make enough insulin, or it cannot use the insulin correctly  It is important to keep diabetes controlled to prevent damage to your heart, blood vessels, and other organs  Have someone call your local emergency number (911 in the [address] [facility]) if:   You cannot be woken  You have signs of diabetic ketoacidosis:     confusion, fatigue    vomiting    rapid heartbeat    fruity smelling breath    extreme thirst    dry mouth and skin    You have any of the following signs of a heart attack:      Squeezing, pressure, or pain in your chest    You may  also have any of the following:     Discomfort or pain in your back, neck, jaw, stomach, or arm    Shortness of breath    Nausea or vomiting    Lightheadedness or a sudden cold sweat    You have any of the following signs of a stroke:      Numbness or drooping on one side of your face     Weakness in an arm or leg    Confusion or difficulty speaking    Dizziness, a severe headache, or vision loss    Call your doctor or diabetes care team if:   You have a sore or wound that will not heal     You have a change in the amount you urinate  Your blood sugar levels are higher than your target goals  You often have lower blood sugar levels than your target goals  Your skin is red, dry, warm, or swollen  You have trouble coping with diabetes, or you feel anxious or depressed  You have questions or concerns about your condition or care      What you need to know about high blood sugar levels:  High blood sugar levels may not cause any symptoms  You may feel more thirsty or urinate more often than usual  Over time, high blood sugar levels can damage your nerves, blood vessels, tissues, and organs  The following can increase your blood sugar levels:  Large meals or large amounts of carbohydrates at one time    Less physical activity    Stress    Illness    A lower dose of medicine or insulin, or a late dose    What you need to know about low blood sugar levels: You can prevent symptoms such as shakiness, dizziness, irritability, or confusion by preventing your blood sugar levels from going too low  Treat a low blood sugar level right away  Drink 4 ounces of juice or have 1 tube of glucose gel  Check your blood sugar level again 10 to 15 minutes later  When the level goes back to normal, eat a meal or snack to prevent another decrease  Keep glucose gel, raisins, or hard candy with you at all times to treat a low blood sugar level  Your blood sugar level can get too low if you take diabetes medicine or insulin and do not eat enough food  If you use insulin, check your blood sugar level before you exercise  If your blood sugar level is below 100 mg/dL, eat 4 crackers or 2 ounces of raisins, or drink 4 ounces of juice  Check your level every 30 minutes if you exercise more than 1 hour  You may need a snack during or after exercise  What you can do to manage your blood sugar levels:   Check your blood sugar levels as directed and as needed  Several items are available to use to check your levels  You may need to check by testing a drop of blood in a glucose monitor  You may instead be given a continuous glucose monitoring (CGM) device  The device is worn at all times  The CGM checks your blood sugar level every 5 minutes  It sends results to an electronic device such as a smart phone   A CGM can be used with or without an insulin pump  Talk with your provider to find out which method is best for you  The goal for blood sugar levels before meals  is between 80 and 130 mg/dL and 2 hours after eating  is lower than 180 mg/dL  Make healthy food choices  Work with a dietitian to develop a meal plan that works for you and your schedule  A dietitian can help you learn how to eat the right amount of carbohydrates during your meals and snacks  Carbohydrates can raise your blood sugar level if you eat too many at one time  Examples of foods that contain carbohydrates are breads, cereals, rice, pasta, and sweets  Get regular physical activity  Physical activity can help you get to your target blood sugar level goal and manage your weight  Get at least 150 minutes of moderate to vigorous aerobic physical activity each week  Do not miss more than 2 days in a row  Do not sit longer than 30 minutes at a time  Your healthcare provider can help you create an activity plan  The plan can include the best activities for you and can help you build your strength and endurance  Maintain a healthy weight  Ask your healthcare provider what a healthy weight is for you  Ask him or her to help you create a safe weight loss plan if you are overweight  Take your diabetes medicine or insulin as directed  You may need diabetes medicine, insulin, or both to help control your blood sugar levels  Your healthcare provider will teach you how and when to take your diabetes medicine or insulin  You will also be taught about side effects oral diabetes medicine can cause  Insulin may be injected, or given through a pump or pen  You and your care team will discuss which method is best for you  An insulin pump  is an implanted device that gives your insulin 24 hours a day  An insulin pump prevents the need for multiple insulin injections in a day  An insulin pen  is a device prefilled with the right amount of insulin  You and your family members will be taught how to draw up and give insulin  if this is the best method for you  Your education team will also teach you how to dispose of needles and syringes  You will learn how much insulin you need  and when to give it  You will be taught when not to give insulin  You will also be taught what to do if your blood sugar level drops too low  This may happen if you take insulin and do not eat the right amount of carbohydrates  Other things you can do to manage type 2 diabetes:   Wear medical alert identification  Wear medical alert jewelry or carry a card that says you have diabetes  Ask your provider where to get these items  Do not smoke  Nicotine and other chemicals in cigarettes and cigars can cause lung and blood vessel damage  It also makes it more difficult to manage your diabetes  Ask your provider for information if you currently smoke and need help to quit  Do not use e-cigarettes or smokeless tobacco in place of cigarettes or to help you quit  They still contain nicotine  Check your feet each day for cuts, scratches, calluses, or other wounds  Look for redness and swelling, and feel for warmth  Wear shoes that fit well  Check your shoes for rocks or other objects that can hurt your feet  Do not walk barefoot or wear shoes without socks  Wear cotton socks to help keep your feet dry  Ask about vaccines you may need  You have a higher risk for serious illness if you get the flu, pneumonia, COVID-19, or hepatitis  Ask your provider if you should get vaccines to prevent these or other diseases, and when to get the vaccines  Talk to your care team if you become stressed about diabetes care  Sometimes being able to fit diabetes care into your life can cause increased stress  The stress can cause you not to take care of yourself properly  Your care team can help by offering tips about self-care   Your care team may suggest you talk to a mental health provider  The provider can listen and offer help with self-care issues  Follow up with your doctor or diabetes care team as directed: You may need to have blood tests done before your follow-up visit  The test results will show if changes need to be made in your treatment or self-care  Write down your questions so you remember to ask them during your visits  Talk to your provider if you cannot afford your medicine  © Copyright [employer] 2022 Information is for End User's use only and may not be sold, redistributed or otherwise used for commercial purposes  All illustrations and images included in CareNotes® are the copyrighted property of A [employer] Inc  or [facility]   The above information is an  only  It is not intended as medical advice for individual conditions or treatments  Talk to your doctor, nurse or pharmacist before following any medical regimen to see if it is safe and effective for you

## 2022-11-04 NOTE — PROGRESS NOTES
[id number]         NAME: Balwinder Brown is a 40 y o  female  : 1980    MRN: [id number]  DATE: 2022  TIME: 10:42 AM    Assessment and Plan   Type 2 diabetes mellitus without complication, without long-term current use of insulin ([facility] ) [E11 9]  1  Type 2 diabetes mellitus without complication, without long-term current use of insulin (HCC)  glipiZIDE (GLUCOTROL XL) 5 mg 24 hr tablet    Comprehensive metabolic panel    Lipid Panel with Direct LDL reflex    Hemoglobin A1C    Comprehensive metabolic panel    Lipid Panel with Direct LDL reflex    Hemoglobin A1C   2  Hyperlipidemia associated with type 2 diabetes mellitus (HCC)  Comprehensive metabolic panel    Lipid Panel with Direct LDL reflex    Comprehensive metabolic panel    Lipid Panel with Direct LDL reflex   3  Paroxysmal atrial fibrillation (HCC)     4  Hypertriglyceridemia  Lipid Panel with Direct LDL reflex    Lipid Panel with Direct LDL reflex   5  Need for influenza vaccination  influenza vaccine, quadrivalent, 0 5 mL, preservative-free, for adult and pediatric patients 6 mos+ (AFLURIA, FLUARIX, FLULAVAL, FLUZONE)       No problem-specific Assessment & Plan notes found for this encounter  Patient Instructions     Patient Instructions         Add glipizide 5 mg once daily  Continue with diet andGOLO supplement  Follow-up with dermatologist as advised  Repeat labs in 3 months  Patient would consider endocrinology consultation, however with her reluctance to take certain medications I am not sure what they would have to offer besides diabetic teaching which she also does not wish to pursue at this time  Type 2 Diabetes Management for Adults   AMBULATORY CARE:   Type 2 diabetes  is a disease that affects how your body uses glucose (sugar)  Either your body cannot make enough insulin, or it cannot use the insulin correctly   It is important to keep diabetes controlled to prevent damage to your heart, blood vessels, and other organs  Have someone call your local emergency number (911 in the [address]) if:   1  You cannot be woken  2  You have signs of diabetic ketoacidosis:     ? confusion, fatigue    ? vomiting    ? rapid heartbeat    ? fruity smelling breath    ? extreme thirst    ? dry mouth and skin    3  You have any of the following signs of a heart attack:      ? Squeezing, pressure, or pain in your chest    ? You may  also have any of the following:     ? Discomfort or pain in your back, neck, jaw, stomach, or arm    ? Shortness of breath    ? Nausea or vomiting    ? Lightheadedness or a sudden cold sweat    4  You have any of the following signs of a stroke:      ? Numbness or drooping on one side of your face     ? Weakness in an arm or leg    ? Confusion or difficulty speaking    ? Dizziness, a severe headache, or vision loss    Call your doctor or diabetes care team if:   · You have a sore or wound that will not heal     · You have a change in the amount you urinate  · Your blood sugar levels are higher than your target goals  · You often have lower blood sugar levels than your target goals  · Your skin is red, dry, warm, or swollen  · You have trouble coping with diabetes, or you feel anxious or depressed  · You have questions or concerns about your condition or care  What you need to know about high blood sugar levels:  High blood sugar levels may not cause any symptoms  You may feel more thirsty or urinate more often than usual  Over time, high blood sugar levels can damage your nerves, blood vessels, tissues, and organs  The following can increase your blood sugar levels:  · Large meals or large amounts of carbohydrates at one time    · Less physical activity    · Stress    · Illness    · A lower dose of medicine or insulin, or a late dose    What you need to know about low blood sugar levels:   You can prevent symptoms such as shakiness, dizziness, irritability, or confusion by preventing your blood sugar levels from going too low  1  Treat a low blood sugar level right away  ? Drink 4 ounces of juice or have 1 tube of glucose gel  ? Check your blood sugar level again 10 to 15 minutes later  ? When the level goes back to normal, eat a meal or snack to prevent another decrease  2  Keep glucose gel, raisins, or hard candy with you at all times to treat a low blood sugar level  3  Your blood sugar level can get too low if you take diabetes medicine or insulin and do not eat enough food  4  If you use insulin, check your blood sugar level before you exercise  ? If your blood sugar level is below 100 mg/dL, eat 4 crackers or 2 ounces of raisins, or drink 4 ounces of juice  ? Check your level every 30 minutes if you exercise more than 1 hour  ? You may need a snack during or after exercise  What you can do to manage your blood sugar levels:   1  Check your blood sugar levels as directed and as needed  Several items are available to use to check your levels  You may need to check by testing a drop of blood in a glucose monitor  You may instead be given a continuous glucose monitoring (CGM) device  The device is worn at all times  The CGM checks your blood sugar level every 5 minutes  It sends results to an electronic device such as a smart phone  A CGM can be used with or without an insulin pump  Talk with your provider to find out which method is best for you  The goal for blood sugar levels before meals  is between 80 and 130 mg/dL and 2 hours after eating  is lower than 180 mg/dL  2  Make healthy food choices  Work with a dietitian to develop a meal plan that works for you and your schedule  A dietitian can help you learn how to eat the right amount of carbohydrates during your meals and snacks  Carbohydrates can raise your blood sugar level if you eat too many at one time   Examples of foods that contain carbohydrates are breads, cereals, rice, pasta, and sweets  3  Get regular physical activity  Physical activity can help you get to your target blood sugar level goal and manage your weight  Get at least 150 minutes of moderate to vigorous aerobic physical activity each week  Do not miss more than 2 days in a row  Do not sit longer than 30 minutes at a time  Your healthcare provider can help you create an activity plan  The plan can include the best activities for you and can help you build your strength and endurance  4  Maintain a healthy weight  Ask your healthcare provider what a healthy weight is for you  Ask him or her to help you create a safe weight loss plan if you are overweight  5  Take your diabetes medicine or insulin as directed  You may need diabetes medicine, insulin, or both to help control your blood sugar levels  Your healthcare provider will teach you how and when to take your diabetes medicine or insulin  You will also be taught about side effects oral diabetes medicine can cause  Insulin may be injected, or given through a pump or pen  You and your care team will discuss which method is best for you  ? An insulin pump  is an implanted device that gives your insulin 24 hours a day  An insulin pump prevents the need for multiple insulin injections in a day  ? An insulin pen  is a device prefilled with the right amount of insulin  ? You and your family members will be taught how to draw up and give insulin  if this is the best method for you  Your education team will also teach you how to dispose of needles and syringes  ? You will learn how much insulin you need  and when to give it  You will be taught when not to give insulin  You will also be taught what to do if your blood sugar level drops too low  This may happen if you take insulin and do not eat the right amount of carbohydrates  Other things you can do to manage type 2 diabetes:   · Wear medical alert identification    Wear medical alert jewelry or carry a card that says you have diabetes  Ask your provider where to get these items  · Do not smoke  Nicotine and other chemicals in cigarettes and cigars can cause lung and blood vessel damage  It also makes it more difficult to manage your diabetes  Ask your provider for information if you currently smoke and need help to quit  Do not use e-cigarettes or smokeless tobacco in place of cigarettes or to help you quit  They still contain nicotine  · Check your feet each day for cuts, scratches, calluses, or other wounds  Look for redness and swelling, and feel for warmth  Wear shoes that fit well  Check your shoes for rocks or other objects that can hurt your feet  Do not walk barefoot or wear shoes without socks  Wear cotton socks to help keep your feet dry  · Ask about vaccines you may need  You have a higher risk for serious illness if you get the flu, pneumonia, COVID-19, or hepatitis  Ask your provider if you should get vaccines to prevent these or other diseases, and when to get the vaccines  · Talk to your care team if you become stressed about diabetes care  Sometimes being able to fit diabetes care into your life can cause increased stress  The stress can cause you not to take care of yourself properly  Your care team can help by offering tips about self-care  Your care team may suggest you talk to a mental health provider  The provider can listen and offer help with self-care issues  Follow up with your doctor or diabetes care team as directed: You may need to have blood tests done before your follow-up visit  The test results will show if changes need to be made in your treatment or self-care  Write down your questions so you remember to ask them during your visits  Talk to your provider if you cannot afford your medicine    © Copyright [employer] 2022 Information is for End User's use only and may not be sold, redistributed or otherwise used for commercial purposes  All illustrations and images included in CareNotes® are the copyrighted property of A D A M , Inc  or [facility] [employer]   The above information is an  only  It is not intended as medical advice for individual conditions or treatments  Talk to your doctor, nurse or pharmacist before following any medical regimen to see if it is safe and effective for you  Chief Complaint     Chief Complaint   Patient presents with   • Follow-up     Review labs         History of Present Illness       Patient comes in to follow-up on recent laboratory work  Labs are reviewed with patient  Medications reconciled  1) diabetes-A1c remains elevated  Patient is taking only Januvia  She does not like potential side effects of metformin so she did not take it  Other options were reviewed  She does not want injectable medications  2) hyperlipidemia-total cholesterol and LDL are in good range  Triglycerides are markedly elevated  3) atrial fibrillation-this has been stable  Review of Systems   Review of Systems   Constitutional: Negative for activity change, appetite change, diaphoresis and fatigue  HENT: Negative for congestion, sinus pressure and sore throat  Respiratory: Negative for cough, chest tightness, shortness of breath and wheezing  Cardiovascular: Negative for chest pain, palpitations and leg swelling  Fast or slow heart rate   Gastrointestinal: Negative for abdominal pain, blood in stool, constipation, diarrhea, nausea and vomiting  Genitourinary: Negative for difficulty urinating, dysuria, frequency and hematuria  Musculoskeletal: Negative for arthralgias, gait problem, joint swelling and myalgias  Neurological: Negative for dizziness, light-headedness and headaches  Psychiatric/Behavioral: Negative for agitation, confusion, dysphoric mood and sleep disturbance  The patient is not nervous/anxious            Current Medications       Current Outpatient Medications:   •  glipiZIDE (GLUCOTROL XL) 5 mg 24 hr tablet, Take 1 tablet (5 mg total) by mouth daily, Disp: 30 tablet, Rfl: 5  •  aspirin 81 MG tablet, Take 81 mg by mouth daily, Disp: , Rfl:   •  atorvastatin (LIPITOR) 20 mg tablet, Take 1 tablet (20 mg total) by mouth daily, Disp: 30 tablet, Rfl: 5  •  Blood Glucose Monitoring Suppl (ONE TOUCH ULTRA 2) w/Device KIT, USE TO TEST BLOOD SUGAR, Disp: 1 each, Rfl: 0  •  flecainide (TAMBOCOR) 100 mg tablet, , Disp: , Rfl:   •  glucose blood (OneTouch Ultra) test strip, Test once daily, Disp: 100 each, Rfl: 3  •  Lancets (OneTouch Delica Plus FGWPAV23I) MISC, TEST 1-2 TIMES A DAY, Disp: 100 each, Rfl: 3  •  metoprolol succinate (TOPROL-XL) 25 mg 24 hr tablet, Take 25 mg by mouth daily, Disp: , Rfl:   •  sitaGLIPtin (Januvia) 100 mg tablet, Take 1 tablet (100 mg total) by mouth daily, Disp: 30 tablet, Rfl: 5    Current Allergies     Allergies as of 11/04/2022   • (No Known Allergies)            The following portions of the patient's history were reviewed and updated as appropriate: allergies, current medications, past family history, past medical history, past social history, past surgical history and problem list      Past Medical History:   Diagnosis Date   • Adnexal cyst     left   • Elevated LFTs 6/1/2020   • History of atrial fibrillation     ablation done, takes metoprolol   • Irregular periods    • Varicella        Past Surgical History:   Procedure Laterality Date   • CARDIAC ELECTROPHYSIOLOGY STUDY AND ABLATION     • CATARACT EXTRACTION     • EYE SURGERY     • OTHER SURGICAL HISTORY      catheter ablation atrial fibrillation   • OVARIAN CYST SURGERY Right 2/2/2018    Procedure: LAPAROSCOPIC PARAOVARIAN  CYSTECTOMY;  Surgeon: Prnicess Tate MD;  Location:  MAIN OR;  Service: Gynecology   • SKIN BIOPSY         Family History   Problem Relation Age of Onset   • Heart disease Father    • Hypertension Maternal Grandmother    • Heart disease Family    • Cancer Family    • Thyroid disease Family    • Substance Abuse Neg Hx    • Mental illness Neg Hx          Medications have been verified  Objective   /74   Pulse 76   Resp 16   Ht 5' 2" (1 575 m)   Wt 79 7 kg (175 lb 9 6 oz)   LMP 11/01/2022   SpO2 98%   BMI 32 12 kg/m²        Physical Exam     Physical Exam  Vitals reviewed  Constitutional:       General: She is not in acute distress  Appearance: She is well-developed  She is not diaphoretic  HENT:      Head: Normocephalic and atraumatic  Right Ear: Tympanic membrane, ear canal and external ear normal       Left Ear: Tympanic membrane, ear canal and external ear normal       Nose: Nose normal  No mucosal edema or rhinorrhea  Right Sinus: No maxillary sinus tenderness  Left Sinus: No maxillary sinus tenderness  Mouth/Throat:      Mouth: Mucous membranes are not pale and not dry  Dentition: Normal dentition  Pharynx: Uvula midline  No oropharyngeal exudate  Eyes:      General:         Right eye: No discharge  Left eye: No discharge  Extraocular Movements: Extraocular movements intact  Conjunctiva/sclera: Conjunctivae normal       Pupils: Pupils are equal, round, and reactive to light  Neck:      Thyroid: No thyromegaly  Cardiovascular:      Rate and Rhythm: Normal rate and regular rhythm  Heart sounds: Normal heart sounds  No murmur heard  Pulmonary:      Effort: Pulmonary effort is normal  No respiratory distress  Breath sounds: Normal breath sounds  No wheezing or rales  Musculoskeletal:      Cervical back: Normal range of motion and neck supple  Right lower leg: No edema  Left lower leg: No edema  Lymphadenopathy:      Cervical: No cervical adenopathy  Neurological:      Mental Status: She is alert and oriented to person, place, and time  Cranial Nerves: No cranial nerve deficit     Psychiatric:         Mood and Affect: Mood normal          Behavior: Behavior normal

## 2022-11-04 NOTE — TELEPHONE ENCOUNTER
TAX [place]#[id number]  ? Acey Section (PTG#[id number])    MOHS Procedure:    ? 16445 head, neck , hands, feet, genitalia (include 46564 for each additional stage)     Repair CPT code: 68995-54074, 51256-17598    ? Diagnosis/ICD code: [facility] J41 012      Primary Insurance: Garry Patino  Phone#: [phone]    Member ID#: [id number]    ? Need Authorization: No    Representative: Ganesh Chapa

## 2022-11-11 ENCOUNTER — OFFICE VISIT (OUTPATIENT)
Dept: DERMATOLOGY | Facility: CLINIC | Age: 42
End: 2022-11-11

## 2022-11-11 DIAGNOSIS — Z48.02 ENCOUNTER FOR REMOVAL OF SUTURES: Primary | ICD-10-CM

## 2022-11-11 NOTE — PROGRESS NOTES
Suture removal    Date/Time: 11/11/2022 1:36 PM  Performed by: Hazel Marie  Authorized by: Monalisa Hook MD   Universal Protocol:  Consent: Verbal consent obtained  Written consent not obtained  Risks and benefits: risks, benefits and alternatives were discussed  Consent given by: patient  Time out: Immediately prior to procedure a "time out" was called to verify the correct patient, procedure, equipment, support staff and site/side marked as required  Timeout called at: 11/11/2022 1:46 PM   Patient understanding: patient states understanding of the procedure being performed  Patient consent: the patient's understanding of the procedure matches consent given  Procedure consent: procedure consent matches procedure scheduled  Relevant documents: relevant documents present and verified  Test results: test results available and properly labeled  Site marked: the operative site was marked  Radiology Images displayed and confirmed  If images not available, report reviewed: imaging studies not available  Patient identity confirmed: verbally with patient        Patient location:  Clinic  Location:     Laterality:  Left    Location:  Upper extremity    Upper extremity location:  Arm (Tricep)  Procedure details: Tools used:  Suture removal kit    Wound appearance:  No sign(s) of infection, good wound healing, clean and moist    Number of sutures removed:  2  Post-procedure details:     Post-procedure assessment: Vaseline  Patient tolerance of procedure:   Tolerated well, no immediate complications

## 2022-11-14 ENCOUNTER — TELEPHONE (OUTPATIENT)
Dept: DERMATOLOGY | Facility: CLINIC | Age: 42
End: 2022-11-14

## 2022-11-14 NOTE — TELEPHONE ENCOUNTER
----- Message from William Arreola MD sent at 11/10/2022  8:00 AM EST -----  CLINICAL DERM TEAM: please let patient know that excision showed lipoma (fat growth), which is what we thought  Thank you

## 2022-11-14 NOTE — TELEPHONE ENCOUNTER
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

## 2022-11-22 ENCOUNTER — PROCEDURE VISIT (OUTPATIENT)
Dept: DERMATOLOGY | Facility: CLINIC | Age: 42
End: 2022-11-22

## 2022-11-22 VITALS
SYSTOLIC BLOOD PRESSURE: 138 MMHG | OXYGEN SATURATION: 93 % | HEIGHT: 62 IN | HEART RATE: 54 BPM | WEIGHT: 175.2 LBS | DIASTOLIC BLOOD PRESSURE: 70 MMHG | TEMPERATURE: 97.7 F | BODY MASS INDEX: 32.24 KG/M2

## 2022-11-22 DIAGNOSIS — C44.41 BASAL CELL CARCINOMA (BCC) OF LEFT SIDE OF NECK: ICD-10-CM

## 2022-11-22 NOTE — PATIENT INSTRUCTIONS
Mohs Microscopic Surgery After Care    WOUND CARE AFTER SURGERY:    Do NOT to remove the pressure bandage for 48 hours  Keep the area clean and dry while this bandage is on  After removing the bandage for the first time, gently clean the area with soap and water  If the bandage is difficult to remove, getting the bandage wet in the shower will sometimes help soften the adhesive and allow it to be removed more easily  You will now need to cleanse this area daily in the shower with gentle soap  There is no need to scrub the area  There is no need for further wound care for 2 weeks  You will notice over this time that the glue will start to flake off  Do not apply and Vaseline or Aquaphor to the area as this will dissolve your skin glue  If you would like to keep the area covered, non stick dressing and paper tape (or Hypafix) are recommended for sensitive skin but a bandaid is fine if it covers the area well  4  All your stitches will dissolve over the next two weeks  2 weeks, you can go ahead and use some Vaseline or Aquaphor to help dissolve any remaining glue  RESTRICTIONS:     For two DAYS:   - You will need to take it very easy as this time is highest risk for bleeding  Being a "couch potato" during these two days is generally recommended  - For surgeries on the face/neck/scalp: Avoid leaning down to pick things up off the floor as this brings blood up to your head  Instead, squat down to pick things up  For two WEEKS:   - No heavy lifting (anything greater than 10 pounds)   - You can start to do slow, gentle activities such as slow walking but nothing to increase your heart rate and blood pressure too much (such as cardiovascular exercise)  It is important to take it easy as there is still a risk for bleeding and a high risk popping of stitches open during this time  MANAGING YOUR PAIN AFTER SURGERY     You can expect to have some pain after surgery   This is normal  The pain is typically worse the first two days after surgery, and quickly begins to get better  The best strategy for controlling your pain after surgery is around the clock pain control  You can take over the counter Acetaminophen (Tylenol) for discomfort, if no contraindications  If you are taking this at the maximum dose, you can alternate this with Motrin (ibuprofen or Advil) as well  Alternating these medications with each other allows you to maximize your pain control  In addition to Tylenol and Motrin, you can use heating pads or ice packs on your incisions to help reduce your pain  How will I alternate your regular strength over-the-counter pain medication? You will take a dose of pain medication every three hours  Start by taking 650 mg of Tylenol (2 pills of 325 mg)   3 hours later take 600 mg of Motrin (3 pills of 200 mg)   3 hours after taking the Motrin take 650 mg of Tylenol   3 hours after that take 600 mg of Motrin  See example - if your first dose of Tylenol is at 12:00 PM     12:00 PM  Tylenol 650 mg (2 pills of 325 mg)    3:00 PM  Motrin 600 mg (3 pills of 200 mg)    6:00 PM  Tylenol 650 mg (2 pills of 325 mg)    9:00 PM  Motrin 600 mg (3 pills of 200 mg)    Continue alternating every 3 hours      Important:   Do not take more than 4000mg of Tylenol or 3200mg of Motrin in a 24-hour period  What if I still have pain? If you have pain that is not controlled with the over-the-counter pain medications (Tylenol and Motrin or Advil), don't hesitate to call our staff using the number provided  We will help make sure you are managing your pain in the best way possible, and if necessary, we can provide a prescription for additional pain medication  CALL OUR OFFICE IMMEDIATELY FOR ANY SIGNS OF INFECTION:    This includes fever, chills, increased redness, warmth, tenderness, severe discomfort/pain, or pus or foul smell coming from the wound   If you are experiencing any of the above, please call [facility] directly at [phone] (SKIN)    IF BLEEDING IS NOTICED:    Place a clean cloth over the area and apply firm pressure for thirty minutes  Check the wound ONLY after 30 minutes of direct pressure; do not cheat and sneak a peak, as that does not count  If bleeding persists after 30 minutes of legitimate direct pressure, then try one more round of direct pressure to the area  Should the bleeding become heavier or not stop after the second attempt, call [facility] directly at [phone] (SKIN)  Your call will get routed to the dermatology surgeon on call even after hours

## 2022-11-22 NOTE — PROGRESS NOTES
MOHS Procedure Note    Patient: Beni Segovia  : 1980  MRN: [id number]  Date: 2022    History of Present Illness: The patient is a 40 y o  female who presents with complaints of Basal cell carcinoma nodular type of the left "interauricular Ear" (infra-auricular neck)      Past Medical History:   Diagnosis Date   • Adnexal cyst     left   • Basal cell carcinoma 2022    left interauricular ear   • Elevated LFTs 2020   • History of atrial fibrillation     ablation done, takes metoprolol   • Irregular periods    • Varicella        Past Surgical History:   Procedure Laterality Date   • CARDIAC ELECTROPHYSIOLOGY STUDY AND ABLATION     • CATARACT EXTRACTION     • EYE SURGERY     • MOHS SURGERY Left 2022    BCC left interauricular ear-Dr Workman   • OTHER SURGICAL HISTORY      catheter ablation atrial fibrillation   • OVARIAN CYST SURGERY Right 2018    Procedure: LAPAROSCOPIC PARAOVARIAN  CYSTECTOMY;  Surgeon: Davy Patient, MD;  Location: [facility] OR;  Service: Gynecology   • SKIN BIOPSY           Current Outpatient Medications:   •  atorvastatin (LIPITOR) 20 mg tablet, Take 1 tablet (20 mg total) by mouth daily, Disp: 30 tablet, Rfl: 5  •  flecainide (TAMBOCOR) 100 mg tablet, , Disp: , Rfl:   •  glipiZIDE (GLUCOTROL XL) 5 mg 24 hr tablet, Take 1 tablet (5 mg total) by mouth daily, Disp: 30 tablet, Rfl: 5  •  metoprolol succinate (TOPROL-XL) 25 mg 24 hr tablet, Take 25 mg by mouth daily, Disp: , Rfl:   •  sitaGLIPtin (Januvia) 100 mg tablet, Take 1 tablet (100 mg total) by mouth daily, Disp: 30 tablet, Rfl: 5  •  aspirin 81 MG tablet, Take 81 mg by mouth daily (Patient not taking: Reported on 2022), Disp: , Rfl:   •  Blood Glucose Monitoring Suppl (ONE TOUCH ULTRA 2) w/Device KIT, USE TO TEST BLOOD SUGAR, Disp: 1 each, Rfl: 0  •  glucose blood (OneTouch Ultra) test strip, Test once daily, Disp: 100 each, Rfl: 3  •  Lancets (OneTouch Delica Plus [id number]) MISC, TEST 1-2 TIMES A DAY, Disp: 100 each, Rfl: 3    No Known Allergies    Physical Exam:   Vitals:    11/22/22 1024   BP: 138/70   Pulse: (!) 54   Temp: 97 7 °F (36 5 °C)   SpO2: 93%     General: Awake, Alert, Oriented x 3, Mood and affect appropriate  Respiratory: Respirations even and unlabored  Cardiovascular: Peripheral pulses intact; no edema  Musculoskeletal Exam: n/a  Skin: 0 7 cm x 0 5 cm pink scar on the left infraauricular neck    Assessment: Biopsy confirmed basal cell carcinoma of the left interauricular neck  Plan: Mohs    MOHS Procedure Timeout    Flowsheet Row Most Recent Value   Timeout: 1120   Patient Identity Verified: Yes   Correct Site Verified: Yes   Correct Procedure Verified: Yes          MOHS Diagnosis/Indication/Location/ID    Flowsheet Row Most Recent Value   Pathology Type Basal cell carcinoma   Anatomic Site --  [left interauricular ear]   Indications for MOHS tumor location   MOHS ID [id number]          MOHS Site/Accession/Pre-Post    Flowsheet Row Most Recent Value   Original Site Identified (as submitted by referring clinician) Photo   Biopsy Accession/Specimen # (as submitted by referring clincian) [id number]   Pre-MOHS Size Length (cm) 0 7   Pre-MOHS Size Width (cm) 0 5   Post-MOHS Size-Length (cm) 1   Post MOHS Size-Width (cm) 0 7   Repair Type Intermediate layered closure   Suture Type Monocryl plus, Vicryl   Monocryl Plus Suture Size 5   Vicryl Suture Size 4   Final repair length (cm): 3   Anesthetic Used 1% Lidocaine with epinephrine  [3cc with bicarb]          MOHS Tumor Stage 1 Information    Flowsheet Row Most Recent Value   Tissue Sections (blocks) 2   Microscopic Exam Section 1: No tumor identified in section  Microscopic Exam Section 2: No tumor identified in section  Tumor Clear After Stage I? Yes                  Patient identified, procedure verified, site identified and verified  Time out completed   Surgical removal of the lesion discussed with the patient (risks and benefits, including possibility of scarring, infection, recurrence or potential for further treatment)  I have specifically identified the site with the patient  I have discussed the fact that the patient will have a scar after the procedure regardless of granulation or repair with sutures  I have discussed that the repair options can range from granulation in some cases to linear or curvilinear closures to larger flaps or grafts  There are sometimes flaps or grafts used that require multiples stages of surgery and will not be completed today, rather be completed over a series of appointments  I have discussed that occasionally due to location, size or depth of the lesion I may recommend consultation with and transfer of care for further removal or the reconstruction to another provider such as ophthalmology surgery, plastic surgery, ENT surgery, or surgical oncology  There are cases in which other testing such as imaging with MRI or CT scan or testing of lymph nodes is recommended because of the nature/depth/location of tumor seen during the removal  There is a risk of injury to nerves causing temporary or permanent numbness or the inability to move muscles full such as the inability to lift eyebrows  Questions answered and verbal and written consent was obtained  The tumor qualifies for Mohs based on AUC criteria  With the patient in the supine position and under adequate local anesthesia with 1% lidocaine with epinephrine 1:100,000, the defect was scrubbed with Hibiclens  Sterile drapes were placed from the sterile tray  Because of the location of the surgical defect, an intermediate closure was judged to give the best possible cosmetic and functional result  The edges of the defect were carefully debrided removing any dead or coagulated tissue  Hemostasis was obtained by pinpoint electrocoagulation    Careful planning of removal of redundant tissue at either end of the defect was drawn out so that the suture lines would fall in the optimal orientation with regard to the relaxed skin tension lines  These were then removed with a #15 blade scalpel  The wound was then approximated by a deep layer of buried vertical mattress sutures and the cutaneous margins were approximated and closed by superficial sutures as noted above  Estimated blood loss was less than 5 mL  The patient tolerated the procedure well  The wound was dressed with petrolatum, a non-stick pad, and a compression dressing  Kevin Ghosh MD served as the surgeon and pathologist during the procedure  Postoperative care: Wound care discussed at length  I urged the patient to call us if any problems or question should arise  Complications: none  Post-op medications: none  Patient condition after procedure: stable  Discharge plans: Plan for follow up as needed      Scribe Attestation    I,:  Gabriele Altamirano am acting as a scribe while in the presence of the attending physician :       I,:  Kevin Ghosh MD personally performed the services described in this documentation    as scribed in my presence :

## 2022-12-01 ENCOUNTER — HOSPITAL ENCOUNTER (OUTPATIENT)
Dept: MAMMOGRAPHY | Facility: IMAGING CENTER | Age: 42
Discharge: HOME | End: 2022-12-01

## 2022-12-01 VITALS — BODY MASS INDEX: 32.2 KG/M2 | HEIGHT: 62 IN | WEIGHT: 175 LBS

## 2022-12-01 DIAGNOSIS — Z12.31 ENCOUNTER FOR SCREENING MAMMOGRAM FOR BREAST CANCER: ICD-10-CM

## 2022-12-06 ENCOUNTER — TELEPHONE (OUTPATIENT)
Dept: FAMILY MEDICINE CLINIC | Facility: CLINIC | Age: 42
End: 2022-12-06

## 2022-12-06 NOTE — TELEPHONE ENCOUNTER
RESULTS -    I left a VM advising pt to call the office for recent test results and an important message from her doctor  Eduardo message also sent  Please attempt to contact the pt until we are certain that she is scheduled for her follow-up testing  See PM's note

## 2022-12-06 NOTE — TELEPHONE ENCOUNTER
----- Message from Michael Kemp MD sent at 12/5/2022  8:49 AM EST -----  Call with result- be sure Michelle Puckett is handled- needs diagnostic mammogram and ultrasound of the right breast per report

## 2022-12-07 NOTE — TELEPHONE ENCOUNTER
Patient is aware that she needs a diagnostic mammogram and ultrasound of the right breast     Patient advised to call [facility] Scheduling to schedule both tests   [phone]  Patient confirmed understanding

## 2022-12-15 ENCOUNTER — OFFICE VISIT (OUTPATIENT)
Dept: GYNECOLOGY | Facility: CLINIC | Age: 42
End: 2022-12-15

## 2022-12-15 VITALS
WEIGHT: 174.4 LBS | HEIGHT: 62 IN | BODY MASS INDEX: 32.09 KG/M2 | SYSTOLIC BLOOD PRESSURE: 112 MMHG | DIASTOLIC BLOOD PRESSURE: 70 MMHG

## 2022-12-15 DIAGNOSIS — Z86.79 HISTORY OF ATRIAL FIBRILLATION: ICD-10-CM

## 2022-12-15 DIAGNOSIS — Z86.19 HISTORY OF HPV INFECTION: ICD-10-CM

## 2022-12-15 DIAGNOSIS — E13.9 DIABETES 1.5, MANAGED AS TYPE 2 (HCC): ICD-10-CM

## 2022-12-15 DIAGNOSIS — N97.0 FEMALE INFERTILITY ASSOCIATED WITH ANOVULATION: ICD-10-CM

## 2022-12-15 DIAGNOSIS — Z12.4 SCREENING FOR CERVICAL CANCER: ICD-10-CM

## 2022-12-15 DIAGNOSIS — R92.8 ABNORMALITY OF RIGHT BREAST ON SCREENING MAMMOGRAM: ICD-10-CM

## 2022-12-15 DIAGNOSIS — Z01.419 WOMEN'S ANNUAL ROUTINE GYNECOLOGICAL EXAMINATION: ICD-10-CM

## 2022-12-15 NOTE — PROGRESS NOTES
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  Abnormal screening mammogram focal asymmetry of the right breast        Plan   Follow-up diagnostic mammogram and breast ultrasound ordered  Patient requests semen analysis to be ordered for her   TSH added to screening labs   All questions answered  Await pap smear results  Subjective here for annual exam     Elver Jiang is a 40 y o  female who presents for annual exam   Patient questioning her ability to achieve pregnancy  She has multiple medical illnesses including atrial fibrillation, diabetes, and is also advanced maternal age  I did discuss these issues with her  She requested her  to have some semen analysis  I did tell her if she would by chance get pregnant it would be extremely high risk because of all her previously mentioned medical illnesses  periods are irregular, lasting 5 days  Dysmenorrhea:none  Cyclic symptoms include pelvic pain  No intermenstrual bleeding, spotting, or discharge  The patient reports that there is not domestic violence in her life  Current contraception: none  History of abnormal Pap smear: yes -self-reported history of high risk HPV  Family history of uterine or ovarian cancer: no  Regular self breast exam: yes  History of abnormal mammogram: yes -abnormal screening mammogram right breast asymmetry  Family history of breast cancer: no  History of abnormal lipids: yes - on Lipitor  Menstrual History:  OB History        0    Para   0    Term   0       0    AB   0    Living   0       SAB   0    IAB   0    Ectopic   0    Multiple   0    Live Births   0                Menarche age:13  Patient's last menstrual period was 2022 (exact date)  Review of Systems  Pertinent items are noted in HPI        Objective No acute distress   /70 (BP Location: Left arm, Patient Position: Sitting, Cuff Size: Standard)   Ht 5' 2" (1 575 m)   Wt 79 1 kg (174 lb 6 4 oz)   LMP 12/05/2022 (Exact Date)   BMI 31 90 kg/m²     General:   alert and oriented, in no acute distress, alert, appears stated age and cooperative   Heart: regular rate and rhythm, S1, S2 normal, no murmur, click, rub or gallop   Lungs: clear to auscultation bilaterally   Abdomen: soft, non-tender, without masses or organomegaly   Vulva: normal, Bartholin's, Urethra, [place]'s normal, female escutcheon   Vagina: normal mucosa, normal discharge, no palpable nodules   Cervix: anteverted, no bleeding following Pap, no cervical motion tenderness, no lesions and nulliparous appearance   Uterus: normal size, anteverted, mobile, non-tender, normal shape and consistency   Adnexa: normal adnexa and no mass, fullness, tenderness   Bilateral breast exam in the sitting and supine position with chaperone present, no visible or palpable breast lesions identified  No breast masses noted  No supraclavicular or axillary lymphadenopathy noted  No nipple discharge  Reviewed self-breast exam techniques     Rectal exam,  deferred

## 2022-12-24 LAB
CYTOLOGIST CVX/VAG CYTO: NORMAL
DX ICD CODE: NORMAL
HPV GENOTYPE REFLEX: NORMAL
HPV I/H RISK 4 DNA CVX QL PROBE+SIG AMP: NEGATIVE
OTHER STN SPEC: NORMAL
PATH REPORT.FINAL DX SPEC: NORMAL
SL AMB NOTE:: NORMAL
SL AMB SPECIMEN ADEQUACY: NORMAL
SL AMB TEST METHODOLOGY: NORMAL

## 2023-01-10 ENCOUNTER — HOSPITAL ENCOUNTER (OUTPATIENT)
Dept: ULTRASOUND IMAGING | Facility: CLINIC | Age: 43
Discharge: HOME | End: 2023-01-10

## 2023-01-10 ENCOUNTER — HOSPITAL ENCOUNTER (OUTPATIENT)
Dept: MAMMOGRAPHY | Facility: CLINIC | Age: 43
Discharge: HOME | End: 2023-01-10

## 2023-01-10 VITALS — WEIGHT: 174.16 LBS | BODY MASS INDEX: 32.05 KG/M2 | HEIGHT: 62 IN

## 2023-01-10 DIAGNOSIS — R92.8 ABNORMAL MAMMOGRAM: ICD-10-CM

## 2023-01-10 NOTE — PROGRESS NOTES
Met with patient and Dr    regarding recommendation for;    __x___ RIGHT ______LEFT      __x___Ultrasound guided  ______Stereotactic breast biopsy  __X___Verbalized understanding        Blood thinners:  No: _____ Yes: __x____ What:      ASA 81 mg           Biopsy teaching sheet given:  Yes: ___X___ No: ________    Pt given contact information and adv to call with any questions/needs

## 2023-02-02 ENCOUNTER — HOSPITAL ENCOUNTER (OUTPATIENT)
Dept: MAMMOGRAPHY | Facility: CLINIC | Age: 43
Discharge: HOME | End: 2023-02-02

## 2023-02-02 ENCOUNTER — HOSPITAL ENCOUNTER (OUTPATIENT)
Dept: ULTRASOUND IMAGING | Facility: CLINIC | Age: 43
Discharge: HOME | End: 2023-02-02

## 2023-02-02 VITALS
HEIGHT: 62 IN | SYSTOLIC BLOOD PRESSURE: 105 MMHG | HEART RATE: 71 BPM | BODY MASS INDEX: 32.02 KG/M2 | DIASTOLIC BLOOD PRESSURE: 70 MMHG | WEIGHT: 174 LBS

## 2023-02-02 DIAGNOSIS — R92.8 ABNORMAL MAMMOGRAM: ICD-10-CM

## 2023-02-02 RX ORDER — LIDOCAINE HYDROCHLORIDE 10 MG/ML
5 INJECTION, SOLUTION EPIDURAL; INFILTRATION; INTRACAUDAL; PERINEURAL ONCE
Status: COMPLETED | OUTPATIENT
Start: 2023-02-02 | End: 2023-02-02

## 2023-02-02 RX ADMIN — LIDOCAINE HYDROCHLORIDE 5 ML: 10 INJECTION, SOLUTION EPIDURAL; INFILTRATION; INTRACAUDAL; PERINEURAL at 08:32

## 2023-02-02 NOTE — PROGRESS NOTES
Patient arrived via:    _x____ambulatory    _____wheelchair    _____stretcher      Domestic violence screen    __x____negative______positive    Breast Implants:    _______yes ___x_____no

## 2023-02-02 NOTE — PROGRESS NOTES
Ice pack given:    __X___yes _____no    Discharge instructions signed by patient:    _____yes ___X__no    Discharge instructions given to patient:    __X___yes _____no    Discharged via:    __X___ambulatory    _____wheelchair    _____stretcher    Stable on discharge:    _X____yes ____no    Site was checked and there were no signs of bleeding, infection, or redness  Patient disclosed that she took off the entire week of work for this procedure  I explained to the patient we could only give her an excuse for the next day not for a week  Patient then declined any excuse note after I mentioned our policy

## 2023-02-02 NOTE — PROGRESS NOTES
Procedure type:    __x___ultrasound guided _____stereotactic    Breast:    _____Left __x___Right    Location: 10 oclock 2 cmfn     Needle: 14g [place]    # of passes: 3     Clip: Ultraclip Heart    Performed by: Dr Mcneal Ip    Pressure held for 5 minutes by: Norma Joe    Steri Strips:    __x___yes _____no    Band aid:    __x___yes_____no    Tape and guaze:    _____yes __x___no    Tolerated procedure:    __x___yes _____no

## 2023-02-02 NOTE — DISCHARGE INSTR - OTHER ORDERS
POST LARGE CORE BREAST BIOPSY PATIENT INFORMATION      Place an ice pack inside your bra over the top of the dressing every hour for 20 minutes (20 minutes on, 60 minutes off)  Do this until bedtime  Do not shower or bathe until the following morning  You may bathe your breast carefully with the steri-strips in place  Be careful    Not to loosen them  The steri-strips will fall off in 3-5 days  You may have mild discomfort, and you may have some bruising where the   Needle entered the skin  This should clear within 5-7 days  If you need medicine for discomfort, take acetaminophen products such as   Tylenol  You may also take Advil or Motrin products  Do not participate in strenuous activities such as-tennis, aerobics, skiing,  Weight lifting, etc  for 24 hours  Refrain from swimming/soaking for 72 hours  Wearing a bra for sleeping may be more comfortable for the first 24-48 hours  Watch for continued bleeding, pain or fever over 101  If any of these symptoms occur, please contact our    breast nurse navigator at the location where your biopsy was performed  During normal business hours (7:30 am-4:00 pm) please call the nurse navigator at the site where your   procedure was performed:    [facility] [address]:  [phone] or [phone] [address] [facility]:     [phone] or [address] [facility]:    [phone] or [phone]  [address] [facility]:   [phone]  [facility]:   [address] [place] [address]:     [phone]              After 4 PM - please call your physician or go to the nearest Emergency Department location  9          The final results of your biopsy are usually available within one week

## 2023-02-06 ENCOUNTER — TELEPHONE (OUTPATIENT)
Dept: MAMMOGRAPHY | Facility: CLINIC | Age: 43
End: 2023-02-06

## 2023-02-06 ENCOUNTER — TELEPHONE (OUTPATIENT)
Dept: HEMATOLOGY ONCOLOGY | Facility: CLINIC | Age: 43
End: 2023-02-06

## 2023-02-06 NOTE — TELEPHONE ENCOUNTER
[facility] Ref[facility]    Diagnosis: radial scar    Is this diagnosis cancer (Y/N): no    Biopsy Date: 2/2/2023    Does the patient have another biopsy pending: no  If so, when:    Preferred provider:Dr Britt Montverde    Preferred location: [place]    Any requests for dates/times:     Any additional information:     Please advise when appointment is made yes

## 2023-02-06 NOTE — TELEPHONE ENCOUNTER
I just wanted to inform you that the above patient was given her breast biopsy results today  The patient was referred to a breast surgeon, Dr Martine Dorado, [facility] notified and will reach out with appointment  If you have any questions or if I can be of any further assistance please let me know    Thank you,  Tanika Mazariegos, [facility]  Breast Nurse Navigator

## 2023-02-06 NOTE — TELEPHONE ENCOUNTER
Made attempt to schedule a new patient appointment with and Surgical oncology, a voicemail was left      (RBC)  [facility] Ref[facility]     Diagnosis: radial scar     Is this diagnosis cancer (Y/N): no     Biopsy Date: 2/2/2023     Does the patient have another biopsy pending: no  If so, when:     Preferred provider:Dr Aparicio Manual     Preferred location: [place]     Any requests for dates/times:      Any additional information:      Please advise when appointment is made yes

## 2023-02-08 ENCOUNTER — TELEPHONE (OUTPATIENT)
Dept: SURGICAL ONCOLOGY | Facility: CLINIC | Age: 43
End: 2023-02-08

## 2023-02-08 DIAGNOSIS — E11.9 TYPE 2 DIABETES MELLITUS WITHOUT COMPLICATION, WITHOUT LONG-TERM CURRENT USE OF INSULIN (HCC): ICD-10-CM

## 2023-02-08 RX ORDER — SITAGLIPTIN 100 MG/1
TABLET, FILM COATED ORAL
Qty: 30 TABLET | Refills: 5 | Status: SHIPPED | OUTPATIENT
Start: 2023-02-08

## 2023-02-08 RX ORDER — ATORVASTATIN CALCIUM 20 MG/1
TABLET, FILM COATED ORAL
Qty: 30 TABLET | Refills: 5 | Status: SHIPPED | OUTPATIENT
Start: 2023-02-08

## 2023-02-23 ENCOUNTER — TELEPHONE (OUTPATIENT)
Dept: SURGICAL ONCOLOGY | Facility: CLINIC | Age: 43
End: 2023-02-23

## 2023-02-23 NOTE — TELEPHONE ENCOUNTER
No return call from KWADWO Smyth today and offered her a sooner appt with Dr Ekaterina Clayton due to dx of radial scar   She was agreeable to appt on 03/21/23 at 10:30 AM

## 2023-03-02 ENCOUNTER — OFFICE VISIT (OUTPATIENT)
Dept: FAMILY MEDICINE CLINIC | Facility: CLINIC | Age: 43
End: 2023-03-02

## 2023-03-02 VITALS
DIASTOLIC BLOOD PRESSURE: 76 MMHG | WEIGHT: 176 LBS | SYSTOLIC BLOOD PRESSURE: 136 MMHG | BODY MASS INDEX: 32.39 KG/M2 | OXYGEN SATURATION: 98 % | RESPIRATION RATE: 16 BRPM | HEART RATE: 76 BPM | HEIGHT: 62 IN

## 2023-03-02 DIAGNOSIS — Z01.818 PRE-OP EXAMINATION: Primary | ICD-10-CM

## 2023-03-02 DIAGNOSIS — H02.402 PTOSIS OF EYELID, LEFT: ICD-10-CM

## 2023-03-02 DIAGNOSIS — E11.69 HYPERLIPIDEMIA ASSOCIATED WITH TYPE 2 DIABETES MELLITUS (HCC): ICD-10-CM

## 2023-03-02 DIAGNOSIS — E11.9 TYPE 2 DIABETES MELLITUS WITHOUT COMPLICATION, WITHOUT LONG-TERM CURRENT USE OF INSULIN (HCC): ICD-10-CM

## 2023-03-02 DIAGNOSIS — E78.5 HYPERLIPIDEMIA ASSOCIATED WITH TYPE 2 DIABETES MELLITUS (HCC): ICD-10-CM

## 2023-03-02 DIAGNOSIS — I48.0 PAROXYSMAL ATRIAL FIBRILLATION (HCC): ICD-10-CM

## 2023-03-02 NOTE — PROGRESS NOTES
[id number]         NAME: Pancho Harley is a 40 y o  female  : 1980    MRN: [id number]  DATE: 2023  TIME: 4:56 PM    Assessment and Plan   Pre-op examination [L80 009]  8  Pre-op examination        2  Ptosis of eyelid, left        3  Paroxysmal atrial fibrillation (HCC)  POCT ECG      4  Type 2 diabetes mellitus without complication, without long-term current use of insulin ([facility] [place] [address] )        5  Hyperlipidemia associated with type 2 diabetes mellitus ([facility] [place] [address] )            No problem-specific Assessment & Plan notes found for this encounter  Patient Instructions     Patient Instructions   Patient medically stable  EKG is normal   We will wait for any preadmission testing to be reviewed  If labs look okay patient will be cleared medically to have proposed eye surgery  Patient will hold her aspirin 1 week prior to planned procedure  Chief Complaint     Chief Complaint   Patient presents with   • Pre-op Exam     Surgery 3/27/23 [facility] outpatient center- L lazy eye repair         History of Present Illness       Patient here for preoperative evaluation for planned eye procedure  Operating surgeon Dr Elroy Medina  Date of procedure tentatively 2023 at the [facility] outpatient center  EKG in the office today is normal   Patient having some blood work done in 3 days  These will be reviewed prior to sending out formal parents  Review of Systems   Review of Systems   Constitutional: Negative for activity change, appetite change, diaphoresis and fatigue  HENT: Negative for congestion, sinus pressure and sore throat  Eyes: Positive for visual disturbance  Respiratory: Negative for cough, chest tightness, shortness of breath and wheezing  Cardiovascular: Negative for chest pain, palpitations and leg swelling  Fast or slow heart rate   Gastrointestinal: Negative for abdominal pain, blood in stool, constipation, diarrhea, nausea and vomiting  Genitourinary: Negative for difficulty urinating, dysuria, frequency and hematuria  Musculoskeletal: Negative for arthralgias, gait problem, joint swelling and myalgias  Neurological: Negative for dizziness, light-headedness and headaches  Psychiatric/Behavioral: Negative for agitation, confusion, dysphoric mood and sleep disturbance  The patient is not nervous/anxious            Current Medications       Current Outpatient Medications:   •  aspirin 81 MG tablet, Take 81 mg by mouth daily, Disp: , Rfl:   •  atorvastatin (LIPITOR) 20 mg tablet, TAKE 1 TABLET BY MOUTH EVERY DAY, Disp: 30 tablet, Rfl: 5  •  Blood Glucose Monitoring Suppl (ONE TOUCH ULTRA 2) w/Device KIT, USE TO TEST BLOOD SUGAR, Disp: 1 each, Rfl: 0  •  flecainide (TAMBOCOR) 100 mg tablet, , Disp: , Rfl:   •  glipiZIDE (GLUCOTROL XL) 5 mg 24 hr tablet, Take 1 tablet (5 mg total) by mouth daily, Disp: 30 tablet, Rfl: 5  •  glucose blood (OneTouch Ultra) test strip, Test once daily, Disp: 100 each, Rfl: 3  •  Januvia 100 MG tablet, TAKE 1 TABLET BY MOUTH EVERY DAY, Disp: 30 tablet, Rfl: 5  •  Lancets (OneTouch Delica Plus [id number]) MISC, TEST 1-2 TIMES A DAY, Disp: 100 each, Rfl: 3  •  metoprolol succinate (TOPROL-XL) 25 mg 24 hr tablet, Take 25 mg by mouth daily, Disp: , Rfl:     Current Allergies     Allergies as of 03/02/2023   • (No Known Allergies)            The following portions of the patient's history were reviewed and updated as appropriate: allergies, current medications, past family history, past medical history, past social history, past surgical history and problem list      Past Medical History:   Diagnosis Date   • Adnexal cyst     left   • Basal cell carcinoma 09/28/2022    left interauricular ear   • Elevated LFTs 06/01/2020   • History of atrial fibrillation     ablation done, takes metoprolol   • Irregular periods    • Melanoma ([facility] [address] ) 2022   • Varicella        Past Surgical History:   Procedure Laterality Date   • CARDIAC ELECTROPHYSIOLOGY STUDY AND ABLATION     • CATARACT EXTRACTION     • EYE SURGERY     • MOHS SURGERY Left 11/22/2022    BCC left interauricular ear-Dr Workman   • OTHER SURGICAL HISTORY      catheter ablation atrial fibrillation   • OVARIAN CYST SURGERY Right 02/02/2018    Procedure: LAPAROSCOPIC PARAOVARIAN  CYSTECTOMY;  Surgeon: Dee Dee Turner MD;  Location: [facility];  Service: Gynecology   • SKIN BIOPSY     • US GUIDED BREAST BIOPSY RIGHT COMPLETE Right 2/2/2023       Family History   Problem Relation Age of Onset   • No Known Problems Mother    • Heart disease Father    • Hypertension Maternal Grandmother    • Bone cancer Maternal Grandmother 80   • No Known Problems Maternal Grandfather    • No Known Problems Paternal Grandmother    • No Known Problems Paternal Grandfather    • Heart disease Family    • Cancer Family    • Thyroid disease Family    • Ovarian cancer Paternal Aunt 46   • Substance Abuse Neg Hx    • Mental illness Neg Hx          Medications have been verified  Objective   /76   Pulse 76   Resp 16   Ht 5' 2" (1 575 m)   Wt 79 8 kg (176 lb)   SpO2 98%   BMI 32 19 kg/m²        Physical Exam     Physical Exam  Constitutional:       General: She is not in acute distress  Appearance: She is well-developed  She is obese  She is not ill-appearing  HENT:      Head: Normocephalic and atraumatic  Right Ear: External ear normal       Left Ear: External ear normal       Nose: Nose normal       Mouth/Throat:      Mouth: Mucous membranes are moist    Eyes:      General:         Right eye: No discharge  Left eye: No discharge  Conjunctiva/sclera: Conjunctivae normal       Pupils: Pupils are equal, round, and reactive to light  Neck:      Thyroid: No thyromegaly  Cardiovascular:      Rate and Rhythm: Normal rate and regular rhythm  Heart sounds: Normal heart sounds  No murmur heard  Pulmonary:      Effort: Pulmonary effort is normal  No respiratory distress  Breath sounds: Normal breath sounds  No wheezing  Chest:      Chest wall: No tenderness  Abdominal:      General: Bowel sounds are normal       Palpations: Abdomen is soft  There is no mass  Tenderness: There is no abdominal tenderness  There is no rebound  Musculoskeletal:      Cervical back: Normal range of motion and neck supple  Right lower leg: No edema  Left lower leg: No edema  Lymphadenopathy:      Cervical: No cervical adenopathy  Skin:     General: Skin is warm and dry  Findings: No erythema  Neurological:      Mental Status: She is alert  Cranial Nerves: No cranial nerve deficit  Deep Tendon Reflexes: Reflexes are normal and symmetric     Psychiatric:         Behavior: Behavior normal          Judgment: Judgment normal

## 2023-03-02 NOTE — PATIENT INSTRUCTIONS
Patient medically stable  EKG is normal   We will wait for any preadmission testing to be reviewed  If labs look okay patient will be cleared medically to have proposed eye surgery  Patient will hold her aspirin 1 week prior to planned procedure

## 2023-03-07 ENCOUNTER — TELEPHONE (OUTPATIENT)
Dept: FAMILY MEDICINE CLINIC | Facility: CLINIC | Age: 43
End: 2023-03-07

## 2023-03-07 LAB
ALBUMIN SERPL-MCNC: 4.2 G/DL (ref 3.8–4.8)
ALBUMIN/GLOB SERPL: 2.3 {RATIO} (ref 1.2–2.2)
ALP SERPL-CCNC: 104 IU/L (ref 44–121)
ALT SERPL-CCNC: 51 IU/L (ref 0–32)
AST SERPL-CCNC: 26 IU/L (ref 0–40)
BILIRUB SERPL-MCNC: 1.1 MG/DL (ref 0–1.2)
BUN SERPL-MCNC: 14 MG/DL (ref 6–24)
BUN/CREAT SERPL: 30 (ref 9–23)
CALCIUM SERPL-MCNC: 9.3 MG/DL (ref 8.7–10.2)
CHLORIDE SERPL-SCNC: 103 MMOL/L (ref 96–106)
CHOLEST SERPL-MCNC: 124 MG/DL (ref 100–199)
CO2 SERPL-SCNC: 27 MMOL/L (ref 20–29)
CREAT SERPL-MCNC: 0.47 MG/DL (ref 0.57–1)
EGFR: 123 ML/MIN/1.73
EST. AVERAGE GLUCOSE BLD GHB EST-MCNC: 203 MG/DL
GLOBULIN SER-MCNC: 1.8 G/DL (ref 1.5–4.5)
GLUCOSE SERPL-MCNC: 245 MG/DL (ref 70–99)
HBA1C MFR BLD: 8.7 % (ref 4.8–5.6)
HDLC SERPL-MCNC: 34 MG/DL
LDLC SERPL CALC-MCNC: 46 MG/DL (ref 0–99)
LDLC/HDLC SERPL: 1.4 RATIO (ref 0–3.2)
POTASSIUM SERPL-SCNC: 4.5 MMOL/L (ref 3.5–5.2)
PROT SERPL-MCNC: 6 G/DL (ref 6–8.5)
SL AMB VLDL CHOLESTEROL CALC: 44 MG/DL (ref 5–40)
SODIUM SERPL-SCNC: 142 MMOL/L (ref 134–144)
TRIGL SERPL-MCNC: 287 MG/DL (ref 0–149)
TSH SERPL DL<=0.005 MIU/L-ACNC: 2.33 UIU/ML (ref 0.45–4.5)

## 2023-03-07 NOTE — TELEPHONE ENCOUNTER
Patient aware of results  She made an appt VV for Thursday morning     ----- Message from Yobany Mack MD sent at 3/7/2023  7:53 AM EST -----  Call patient with lab result-A1c is better but sugar still very high  We should probably adjust her medications  Probably the best plan would be basal insulin at this point  She was just in the office, I can do a video visit if she would prefer to discuss the options

## 2023-03-09 ENCOUNTER — TELEMEDICINE (OUTPATIENT)
Dept: FAMILY MEDICINE CLINIC | Facility: CLINIC | Age: 43
End: 2023-03-09

## 2023-03-09 DIAGNOSIS — E11.9 TYPE 2 DIABETES MELLITUS WITHOUT COMPLICATION, WITHOUT LONG-TERM CURRENT USE OF INSULIN (HCC): Primary | ICD-10-CM

## 2023-03-09 NOTE — PATIENT INSTRUCTIONS
Medication options were discussed  We will go with Jardiance 10 mg daily and continue Januvia 100 mg daily  Monitor sugars at home  Fasting sugars not coming down the next 3 to 4 weeks, patient will call and we will increase Jardiance to 25 mg  Side effects were discussed with patient

## 2023-03-09 NOTE — PROGRESS NOTES
Virtual Regular Visit    Verification of patient location:    Patient is located in the following state in which I hold an active license [place]      Assessment/Plan:    Problem List Items Addressed This Visit    None           Reason for visit is No chief complaint on file  Encounter provider Stefania Escobar MD    Provider located at [address] [place] [id number]      Recent Visits  Date Type Provider Dept   03/07/23 Telephone [address]   03/02/23 Office Visit Stefania Escobar MD [facility] recent visits within past 7 days and meeting all other requirements  Future Appointments  No visits were found meeting these conditions  Showing future appointments within next 150 days and meeting all other requirements       The patient was identified by name and date of birth  Juan Carlos Cole was informed that this is a telemedicine visit and that the visit is being conducted through the [address] [employer] platform  She agrees to proceed     My office door was closed  No one else was in the room  She acknowledged consent and understanding of privacy and security of the video platform  The patient has agreed to participate and understands they can discontinue the visit at any time  Patient is aware this is a billable service  Subjective  Juan Carlos Cole is a 40 y o  female -   Patient to follow-up on recent labs  A1c at 8 7  This is down but still elevated  Patient only taking Januvia 100 mg once daily  Is reluctant to take metformin due to potential side effects  She never filled the glipizide as ordered  Options were discussed  Patient willing to try Jardiance 10 mg daily         Past Medical History:   Diagnosis Date   • Adnexal cyst     left   • Basal cell carcinoma 09/28/2022    left interauricular ear   • Elevated LFTs 06/01/2020   • History of atrial fibrillation     ablation done, takes metoprolol   • Irregular periods    • Melanoma ([address] ) 2022   • Varicella        Past Surgical History:   Procedure Laterality Date   • CARDIAC ELECTROPHYSIOLOGY STUDY AND ABLATION     • CATARACT EXTRACTION     • EYE SURGERY     • MOHS SURGERY Left 11/22/2022    BCC left interauricular ear-Dr Workman   • OTHER SURGICAL HISTORY      catheter ablation atrial fibrillation   • OVARIAN CYST SURGERY Right 02/02/2018    Procedure: LAPAROSCOPIC PARAOVARIAN  CYSTECTOMY;  Surgeon: Raleigh Moreland MD;  Location:  MAIN OR;  Service: Gynecology   • SKIN BIOPSY     • US GUIDED BREAST BIOPSY RIGHT COMPLETE Right 2/2/2023       Current Outpatient Medications   Medication Sig Dispense Refill   • aspirin 81 MG tablet Take 81 mg by mouth daily     • atorvastatin (LIPITOR) 20 mg tablet TAKE 1 TABLET BY MOUTH EVERY DAY 30 tablet 5   • Blood Glucose Monitoring Suppl (ONE TOUCH ULTRA 2) w/Device KIT USE TO TEST BLOOD SUGAR 1 each 0   • flecainide (TAMBOCOR) 100 mg tablet      • glucose blood (OneTouch Ultra) test strip Test once daily 100 each 3   • Januvia 100 MG tablet TAKE 1 TABLET BY MOUTH EVERY DAY 30 tablet 5   • Lancets (OneTouch Delica Plus [id number]) MISC TEST 1-2 TIMES A  each 3   • metoprolol succinate (TOPROL-XL) 25 mg 24 hr tablet Take 25 mg by mouth daily       No current facility-administered medications for this visit  No Known Allergies    Review of Systems   Constitutional: Negative for appetite change  Respiratory: Negative for cough  Endocrine: Positive for polyuria  Video Exam    There were no vitals filed for this visit  Physical Exam  Constitutional:       Appearance: Normal appearance  HENT:      Head: Normocephalic and atraumatic  Pulmonary:      Effort: Pulmonary effort is normal  No respiratory distress  Neurological:      Mental Status: She is alert  Psychiatric:         Behavior: Behavior normal         Patient Instructions   Medication options were discussed    We will go with Jardiance 10 mg daily and continue Januvia 100 mg daily  Monitor sugars at home  Fasting sugars not coming down the next 3 to 4 weeks, patient will call and we will increase Jardiance to 25 mg  Side effects were discussed with patient          I spent 15 minutes directly with the patient during this visit

## 2023-03-21 ENCOUNTER — TELEPHONE (OUTPATIENT)
Dept: SURGICAL ONCOLOGY | Facility: CLINIC | Age: 43
End: 2023-03-21

## 2023-03-21 NOTE — TELEPHONE ENCOUNTER
Hardy Amina had come in for  her new patient appt late  Discussed her case with Dr Ekaterina Clayton  Pt has a high HgA1c which she has been started on new medication for  As per Dr Aicha Marvin, pt's appt was re-scheduled for July in order for her to improve her HgA1c  Informed Hardy Amina and she has confirmed her appt for July 11, 2023

## 2023-03-21 NOTE — TELEPHONE ENCOUNTER
----- Message from Veto Eisenmenger, MD sent at 3/21/2023 11:58 AM EDT -----  July 11th please    ----- Message -----  From: Hardeep Gtz RN  Sent: 3/21/2023  11:26 AM EDT  To: Veto Eisenmenger, MD    Need to R/S new patient appt due to elevated HgA1c and late to appt  Options:  June 19, and July 3  there are 3 cancer spots and a 2:30 pre op spot  June 20 & 26 there are 3 cancer spots  First new benign appt is July 11 at 10:30 AM  What would you like to offer her?

## 2023-05-09 ENCOUNTER — OFFICE VISIT (OUTPATIENT)
Dept: FAMILY MEDICINE CLINIC | Facility: CLINIC | Age: 43
End: 2023-05-09

## 2023-05-09 VITALS
BODY MASS INDEX: 31.83 KG/M2 | TEMPERATURE: 96.4 F | SYSTOLIC BLOOD PRESSURE: 107 MMHG | DIASTOLIC BLOOD PRESSURE: 72 MMHG | HEART RATE: 56 BPM | OXYGEN SATURATION: 94 % | WEIGHT: 174 LBS

## 2023-05-09 DIAGNOSIS — L23.7 POISON IVY DERMATITIS: Primary | ICD-10-CM

## 2023-05-09 RX ORDER — PREDNISONE 20 MG/1
TABLET ORAL
Qty: 15 TABLET | Refills: 0 | Status: SHIPPED | OUTPATIENT
Start: 2023-05-09

## 2023-05-09 NOTE — PROGRESS NOTES
[facility]        NAME: Darian Rodarte is a 40 y o  female  : 1980    MRN: [id number]  DATE: May 9, 2023  TIME: 2:34 PM    Assessment and Plan   Poison ivy dermatitis [L23 7]  1  Poison ivy dermatitis  predniSONE 20 mg tablet            Patient Instructions     Patient Instructions   Prednisone as prescribed for poison ivy  Avoid scratching  Keep rash clean/dry, apply hydrocortisone/calamine lotion, cool compresses to relieve itching  Call with problems/concerns            Chief Complaint     Chief Complaint   Patient presents with   • Rash     Poison ivy         History of Present Illness       Poison ivy-was pulling weeds-rash started on arms has spread to chest/abdomen  Applying calamine lotion  Review of Systems   Review of Systems   Constitutional: Negative for activity change and fever  Respiratory: Negative for shortness of breath and wheezing  Cardiovascular: Negative for chest pain  Skin: Positive for color change and rash           Current Medications       Current Outpatient Medications:   •  aspirin 81 MG tablet, Take 81 mg by mouth daily, Disp: , Rfl:   •  atorvastatin (LIPITOR) 20 mg tablet, TAKE 1 TABLET BY MOUTH EVERY DAY, Disp: 30 tablet, Rfl: 5  •  Blood Glucose Monitoring Suppl (ONE TOUCH ULTRA 2) w/Device KIT, USE TO TEST BLOOD SUGAR, Disp: 1 each, Rfl: 0  •  Empagliflozin (Jardiance) 10 MG TABS tablet, Take 1 tablet (10 mg total) by mouth in the morning, Disp: 30 tablet, Rfl: 3  •  flecainide (TAMBOCOR) 100 mg tablet, , Disp: , Rfl:   •  glucose blood (OneTouch Ultra) test strip, Test once daily, Disp: 100 each, Rfl: 3  •  Januvia 100 MG tablet, TAKE 1 TABLET BY MOUTH EVERY DAY, Disp: 30 tablet, Rfl: 5  •  Lancets (OneTouch Delica Plus [id number]) MISC, TEST 1-2 TIMES A DAY, Disp: 100 each, Rfl: 3  •  metoprolol succinate (TOPROL-XL) 25 mg 24 hr tablet, Take 25 mg by mouth daily, Disp: , Rfl:   •  predniSONE 20 mg tablet, Take one tablet twice a day x 5 days, then take one tablet daily x 5 days, Disp: 15 tablet, Rfl: 0    Current Allergies     Allergies as of 05/09/2023   • (No Known Allergies)            The following portions of the patient's history were reviewed and updated as appropriate: allergies, current medications, past family history, past medical history, past social history, past surgical history and problem list      Past Medical History:   Diagnosis Date   • Adnexal cyst     left   • Basal cell carcinoma 09/28/2022    left interauricular ear   • Elevated LFTs 06/01/2020   • History of atrial fibrillation     ablation done, takes metoprolol   • Irregular periods    • Melanoma ([address] ) 2022   • Varicella        Past Surgical History:   Procedure Laterality Date   • CARDIAC ELECTROPHYSIOLOGY STUDY AND ABLATION     • CATARACT EXTRACTION     • EYE SURGERY     • MOHS SURGERY Left 11/22/2022    BCC left interauricular ear-Dr Workman   • OTHER SURGICAL HISTORY      catheter ablation atrial fibrillation   • OVARIAN CYST SURGERY Right 02/02/2018    Procedure: LAPAROSCOPIC PARAOVARIAN  CYSTECTOMY;  Surgeon: Hemalatha Allen MD;  Location: [facility] OR;  Service: Gynecology   • SKIN BIOPSY     • US GUIDED BREAST BIOPSY RIGHT COMPLETE Right 2/2/2023       Family History   Problem Relation Age of Onset   • No Known Problems Mother    • Heart disease Father    • Hypertension Maternal Grandmother    • Bone cancer Maternal Grandmother 80   • No Known Problems Maternal Grandfather    • No Known Problems Paternal Grandmother    • No Known Problems Paternal Grandfather    • Heart disease Family    • Cancer Family    • Thyroid disease Family    • Ovarian cancer Paternal Aunt 46   • Substance Abuse Neg Hx    • Mental illness Neg Hx          Medications have been verified          Objective   /72 (BP Location: Left arm, Patient Position: Sitting, Cuff Size: Standard)   Pulse 56   Temp (!) 96 4 °F (35 8 °C) (Tympanic)   Wt 78 9 kg (174 lb)   SpO2 94%   BMI 31 83 kg/m² Physical Exam     Physical Exam  Vitals and nursing note reviewed  Constitutional:       General: She is not in acute distress  Appearance: Normal appearance  She is not ill-appearing  Eyes:      Extraocular Movements: Extraocular movements intact  Cardiovascular:      Rate and Rhythm: Normal rate and regular rhythm  Heart sounds: Normal heart sounds  Pulmonary:      Effort: Pulmonary effort is normal  No respiratory distress  Breath sounds: Normal breath sounds  No wheezing  Musculoskeletal:      Cervical back: Normal range of motion  Skin:     Findings: Rash present  Comments: Poison ivy rash to b/l arms right, chest, under right breast, abdomen   Neurological:      Mental Status: She is alert and oriented to person, place, and time     Psychiatric:         Mood and Affect: Mood normal          Behavior: Behavior normal

## 2023-05-09 NOTE — PATIENT INSTRUCTIONS
Prednisone as prescribed for poison ivy  Avoid scratching  Keep rash clean/dry, apply hydrocortisone/calamine lotion, cool compresses to relieve itching  Call with problems/concerns

## 2023-06-08 ENCOUNTER — TELEPHONE (OUTPATIENT)
Dept: SURGICAL ONCOLOGY | Facility: CLINIC | Age: 43
End: 2023-06-08

## 2023-06-08 NOTE — TELEPHONE ENCOUNTER
----- Message from Tabatha Peñaloza MD sent at 6/8/2023 12:47 PM EDT -----  Another pt who can move up

## 2023-06-08 NOTE — TELEPHONE ENCOUNTER
Called and spoke with patient and rescheduled appointment to Monday at [address] [place] [address]  Patient then returned call and rescheduled to Tuesday at 8am due to work schedule  She was agreeable

## 2023-06-13 ENCOUNTER — CONSULT (OUTPATIENT)
Dept: SURGICAL ONCOLOGY | Facility: CLINIC | Age: 43
End: 2023-06-13
Payer: COMMERCIAL

## 2023-06-13 VITALS
SYSTOLIC BLOOD PRESSURE: 118 MMHG | BODY MASS INDEX: 32.35 KG/M2 | HEART RATE: 69 BPM | RESPIRATION RATE: 16 BRPM | WEIGHT: 175.8 LBS | DIASTOLIC BLOOD PRESSURE: 70 MMHG | HEIGHT: 62 IN | OXYGEN SATURATION: 93 % | TEMPERATURE: 97.2 F

## 2023-06-13 DIAGNOSIS — Z80.41 FAMILY HISTORY OF OVARIAN CANCER: ICD-10-CM

## 2023-06-13 DIAGNOSIS — N64.89 RADIAL SCAR OF RIGHT BREAST: Primary | ICD-10-CM

## 2023-06-13 DIAGNOSIS — E11.9 TYPE 2 DIABETES MELLITUS WITHOUT COMPLICATION, WITHOUT LONG-TERM CURRENT USE OF INSULIN (HCC): ICD-10-CM

## 2023-06-13 PROCEDURE — 99204 OFFICE O/P NEW MOD 45 MIN: CPT | Performed by: SURGERY

## 2023-06-13 RX ORDER — CEFAZOLIN SODIUM 1 G/50ML
1000 SOLUTION INTRAVENOUS
OUTPATIENT
Start: 2023-06-13

## 2023-06-13 NOTE — PROGRESS NOTES
Breast Consultation-Surgical Oncology     [address] [facility] [place] [address] [facility] [id number]    Name:  Pattie Ennis  YOB: 1980  MRN:  [id number]    Assessment/Plan   Diagnoses and all orders for this visit:    Radial scar of right breast  -     HEMOGLOBIN A1C W/ EAG ESTIMATION; Future  -     HEMOGLOBIN A1C W/ EAG ESTIMATION    Type 2 diabetes mellitus without complication, without long-term current use of insulin (HCC)  -     HEMOGLOBIN A1C W/ EAG ESTIMATION; Future  -     HEMOGLOBIN A1C W/ EAG ESTIMATION    Other orders  -     Reason for no Pharmacologic VTE Prophylaxis; Standing  -     Apply SCD or Foot pumps; Standing  -     ceFAZolin (ANCEF) IVPB (premix in dextrose) 1,000 mg 50 mL          HPI: Pattie Ennis is a 40y o  year old female who presents with abnormal imaging of the right breast which led to a biopsy revealing a radial scar  She denies any breast referable concerns  She denies any family history of breast cancer  She does report family history of ovarian cancer in a paternal aunt  I will eventually discuss genetic testing with her  Surgical treatment to date consisted of n/a  Oncology History:    Oncology History    No history exists         Pertinent reproductive history:  Age at menarche:    OB History        0    Para   0    Term   0       0    AB   0    Living   0       SAB   0    IAB   0    Ectopic   0    Multiple   0    Live Births   0           Obstetric Comments   Age at menarche 16               Problem List:   Patient Active Problem List   Diagnosis   • Female pelvic pain   • Type 2 diabetes mellitus without complication, without long-term current use of insulin (HCC)   • Hypertriglyceridemia   • Hyperlipidemia associated with type 2 diabetes mellitus (HCC)   • Paroxysmal atrial fibrillation (HCC)   • Radial scar of right breast     Past Medical History:   Diagnosis Date   • Adnexal cyst left   • Basal cell carcinoma 2022    left interauricular ear   • Elevated LFTs 2020   • History of atrial fibrillation     ablation done, takes metoprolol   • Irregular periods    • Melanoma ([address] )    • Varicella      Past Surgical History:   Procedure Laterality Date   • BREAST BIOPSY Right 2023   • CARDIAC ELECTROPHYSIOLOGY STUDY AND ABLATION     • CATARACT EXTRACTION     • EYE SURGERY     • MOHS SURGERY Left 2022    BCC left interauricular ear-Dr Workman   • OTHER SURGICAL HISTORY      catheter ablation atrial fibrillation   • OVARIAN CYST SURGERY Right 2018    Procedure: LAPAROSCOPIC PARAOVARIAN  CYSTECTOMY;  Surgeon: Shivani Pedro MD;  Location: [facility];  Service: Gynecology   • SKIN BIOPSY     • US GUIDED BREAST BIOPSY RIGHT COMPLETE Right 2023     Family History   Problem Relation Age of Onset   • No Known Problems Mother    • Heart disease Father    • Colon cancer Maternal Uncle 58   • Ovarian cancer Paternal Aunt 64   • Hypertension Maternal Grandmother    • Bone cancer Maternal Grandmother 80   • No Known Problems Maternal Grandfather    • No Known Problems Paternal Grandmother    • No Known Problems Paternal Grandfather    • Heart disease Family    • Cancer Family    • Thyroid disease Family    • Substance Abuse Neg Hx    • Mental illness Neg Hx      Social History     Socioeconomic History   • Marital status: /Civil Union     Spouse name: Elliott   • Number of children: 0   • Years of education: Not on file   • Highest education level: Not on file   Occupational History   • Occupation:    Tobacco Use   • Smoking status: Former     Packs/day: 1 50     Years: 14 00     Total pack years: 21 00     Types: Cigarettes     Start date: 57     Quit date: 2012     Years since quittin 4   • Smokeless tobacco: Never   Vaping Use   • Vaping Use: Never used   Substance and Sexual Activity   • Alcohol use: Yes     Comment: rare   • Drug use: No   • Sexual activity: Yes     Partners: Male     Birth control/protection: None   Other Topics Concern   • Not on file   Social History Narrative    Always uses seatbelt    Caffeine use     Social Determinants of Health     Financial Resource Strain: Not on file   Food Insecurity: Not on file   Transportation Needs: Not on file   Physical Activity: Not on file   Stress: Not on file   Social Connections: Not on file   Intimate Partner Violence: Not on file   Housing Stability: Not on file     Current Outpatient Medications   Medication Sig Dispense Refill   • atorvastatin (LIPITOR) 20 mg tablet TAKE 1 TABLET BY MOUTH EVERY DAY 30 tablet 5   • Blood Glucose Monitoring Suppl (ONE TOUCH ULTRA 2) w/Device KIT USE TO TEST BLOOD SUGAR 1 each 0   • Empagliflozin (Jardiance) 10 MG TABS tablet Take 1 tablet (10 mg total) by mouth in the morning 30 tablet 3   • flecainide (TAMBOCOR) 100 mg tablet      • glucose blood (OneTouch Ultra) test strip Test once daily 100 each 3   • Januvia 100 MG tablet TAKE 1 TABLET BY MOUTH EVERY DAY 30 tablet 5   • Lancets (OneTouch Delica Plus [id number]) MISC TEST 1-2 TIMES A  each 3   • metoprolol succinate (TOPROL-XL) 25 mg 24 hr tablet Take 25 mg by mouth daily     • aspirin 81 MG tablet Take 81 mg by mouth daily (Patient not taking: Reported on 6/13/2023)     • predniSONE 20 mg tablet Take one tablet twice a day x 5 days, then take one tablet daily x 5 days (Patient not taking: Reported on 6/13/2023) 15 tablet 0     No current facility-administered medications for this visit  No Known Allergies      The following portions of the patient's history were reviewed and updated as appropriate: allergies, current medications, past family history, past medical history, past social history, past surgical history and problem list     Review of Systems:  Review of Systems   Constitutional: Negative  Negative for appetite change and fever  Eyes: Negative      Respiratory: Negative for shortness of breath  Cardiovascular: Negative  Gastrointestinal: Negative  Endocrine: Negative  Genitourinary: Negative  Musculoskeletal: Negative  Negative for arthralgias and myalgias  Skin: Negative  Allergic/Immunologic: Negative  Neurological: Negative  Hematological: Negative  Negative for adenopathy  Does not bruise/bleed easily  Psychiatric/Behavioral: Negative  Physical Exam:  Physical Exam  Constitutional:       General: She is not in acute distress  Appearance: Normal appearance  She is well-developed  HENT:      Head: Normocephalic and atraumatic  Cardiovascular:      Heart sounds: Normal heart sounds  Pulmonary:      Breath sounds: Normal breath sounds  Chest:   Breasts:     Right: No inverted nipple, mass, nipple discharge, skin change or tenderness  Left: No inverted nipple, mass, nipple discharge, skin change or tenderness  Abdominal:      Palpations: Abdomen is soft  Musculoskeletal:      Right lower leg: No edema  Left lower leg: No edema  Lymphadenopathy:      Upper Body:      Right upper body: No supraclavicular, axillary or pectoral adenopathy  Left upper body: No supraclavicular, axillary or pectoral adenopathy  Neurological:      Mental Status: She is alert and oriented to person, place, and time     Psychiatric:         Mood and Affect: Mood normal          Laboratory:  2023 core biopsy right breast 10:00    Pathology revealed: Radial sclerosing lesion        Other studies: Last hemoglobin A1c from 3/6/2023 was 8 7    Imagin22 bilateral 3D screening mammogram was a BI-RADS 0 secondary to an outer asymmetry on the right side, left side was benign, density is a 2      1/10/2023 right 3D diagnostic mammogram and ultrasound again shows the focal asymmetry upper outer right breast with a sonographic correlate measuring roughly 8 mm    2023 postbiopsy mammogram is concordant, surgical consultation recommended, standard heart shaped clip in place        Discussion/Summary: 78-year-old female who had an abnormal mammogram of the right breast   This led to a biopsy revealing a radial scar  She was referred for surgical excision  I discussed these findings with her  She understands that there is up to 25% upgrading in the literature and I do concur with the recommendation for surgery  I reviewed this procedure with her and answered her question  We also discussed her diabetes management  She states that her medications were changed after the last A1c but she continues to have high sugar levels at home  She has not met with a diabetic educator or dietitian  She is scheduled to meet with her PCP this coming Monday  I will repeat the A1c as it has been 3 months  The goal for elective surgery is under 8 per guidelines  She did sign consent today  And we will give her a date in the near term but will need clearance for the diabetes

## 2023-06-14 NOTE — PROGRESS NOTES
Call placed to patient regarding recommendation for;    __X___ RIGHT ______LEFT      __X___SAVI  placement. Procedure explained to patient, additional questions answered at this time    __X___Verbalized understanding.       Blood thinners:  _____yes __X___no    Date stopped: ___N/A____    All teaching points discussed during call, pt with no questions at this time, pt adv to arrive at 1200 for 1230 insertion    Pt given name/# for any further questions/needs

## 2023-06-16 ENCOUNTER — APPOINTMENT (OUTPATIENT)
Dept: LAB | Facility: HOSPITAL | Age: 43
End: 2023-06-16
Payer: COMMERCIAL

## 2023-06-16 ENCOUNTER — HOSPITAL ENCOUNTER (OUTPATIENT)
Dept: RADIOLOGY | Facility: HOSPITAL | Age: 43
End: 2023-06-16
Payer: COMMERCIAL

## 2023-06-16 DIAGNOSIS — N64.89 RADIAL SCAR OF RIGHT BREAST: ICD-10-CM

## 2023-06-16 DIAGNOSIS — N64.89 RADIAL SCAR OF BREAST: ICD-10-CM

## 2023-06-16 LAB
ALBUMIN SERPL BCP-MCNC: 4.2 G/DL (ref 3.5–5)
ALP SERPL-CCNC: 86 U/L (ref 34–104)
ALT SERPL W P-5'-P-CCNC: 40 U/L (ref 7–52)
ANION GAP SERPL CALCULATED.3IONS-SCNC: 7 MMOL/L (ref 4–13)
AST SERPL W P-5'-P-CCNC: 25 U/L (ref 13–39)
BASOPHILS # BLD AUTO: 0.09 THOUSANDS/ÂΜL (ref 0–0.1)
BASOPHILS NFR BLD AUTO: 1 % (ref 0–1)
BILIRUB SERPL-MCNC: 1.32 MG/DL (ref 0.2–1)
BILIRUB UR QL STRIP: NEGATIVE
BUN SERPL-MCNC: 15 MG/DL (ref 5–25)
CALCIUM SERPL-MCNC: 9.2 MG/DL (ref 8.4–10.2)
CHLORIDE SERPL-SCNC: 108 MMOL/L (ref 96–108)
CLARITY UR: CLEAR
CO2 SERPL-SCNC: 28 MMOL/L (ref 21–32)
COLOR UR: YELLOW
CREAT SERPL-MCNC: 0.56 MG/DL (ref 0.6–1.3)
EOSINOPHIL # BLD AUTO: 0.32 THOUSAND/ÂΜL (ref 0–0.61)
EOSINOPHIL NFR BLD AUTO: 4 % (ref 0–6)
ERYTHROCYTE [DISTWIDTH] IN BLOOD BY AUTOMATED COUNT: 15.1 % (ref 11.6–15.1)
GFR SERPL CREATININE-BSD FRML MDRD: 116 ML/MIN/1.73SQ M
GLUCOSE P FAST SERPL-MCNC: 200 MG/DL (ref 65–99)
GLUCOSE UR STRIP-MCNC: ABNORMAL MG/DL
HCT VFR BLD AUTO: 50.6 % (ref 34.8–46.1)
HGB BLD-MCNC: 15.5 G/DL (ref 11.5–15.4)
HGB UR QL STRIP.AUTO: NEGATIVE
IMM GRANULOCYTES # BLD AUTO: 0.04 THOUSAND/UL (ref 0–0.2)
IMM GRANULOCYTES NFR BLD AUTO: 1 % (ref 0–2)
KETONES UR STRIP-MCNC: NEGATIVE MG/DL
LEUKOCYTE ESTERASE UR QL STRIP: NEGATIVE
LYMPHOCYTES # BLD AUTO: 2.19 THOUSANDS/ÂΜL (ref 0.6–4.47)
LYMPHOCYTES NFR BLD AUTO: 30 % (ref 14–44)
MCH RBC QN AUTO: 26.9 PG (ref 26.8–34.3)
MCHC RBC AUTO-ENTMCNC: 30.6 G/DL (ref 31.4–37.4)
MCV RBC AUTO: 88 FL (ref 82–98)
MONOCYTES # BLD AUTO: 0.4 THOUSAND/ÂΜL (ref 0.17–1.22)
MONOCYTES NFR BLD AUTO: 6 % (ref 4–12)
NEUTROPHILS # BLD AUTO: 4.16 THOUSANDS/ÂΜL (ref 1.85–7.62)
NEUTS SEG NFR BLD AUTO: 58 % (ref 43–75)
NITRITE UR QL STRIP: NEGATIVE
NRBC BLD AUTO-RTO: 0 /100 WBCS
PH UR STRIP.AUTO: 6 [PH]
PLATELET # BLD AUTO: 207 THOUSANDS/UL (ref 149–390)
PMV BLD AUTO: 9.4 FL (ref 8.9–12.7)
POTASSIUM SERPL-SCNC: 4.8 MMOL/L (ref 3.5–5.3)
PROT SERPL-MCNC: 6.8 G/DL (ref 6.4–8.4)
PROT UR STRIP-MCNC: NEGATIVE MG/DL
RBC # BLD AUTO: 5.76 MILLION/UL (ref 3.81–5.12)
SODIUM SERPL-SCNC: 143 MMOL/L (ref 135–147)
SP GR UR STRIP.AUTO: 1.02 (ref 1–1.03)
UROBILINOGEN UR STRIP-ACNC: <2 MG/DL
WBC # BLD AUTO: 7.2 THOUSAND/UL (ref 4.31–10.16)

## 2023-06-16 PROCEDURE — 85025 COMPLETE CBC W/AUTO DIFF WBC: CPT

## 2023-06-16 PROCEDURE — 71046 X-RAY EXAM CHEST 2 VIEWS: CPT

## 2023-06-16 PROCEDURE — 80053 COMPREHEN METABOLIC PANEL: CPT

## 2023-06-16 PROCEDURE — 83036 HEMOGLOBIN GLYCOSYLATED A1C: CPT

## 2023-06-16 PROCEDURE — 36415 COLL VENOUS BLD VENIPUNCTURE: CPT

## 2023-06-16 PROCEDURE — 93005 ELECTROCARDIOGRAM TRACING: CPT

## 2023-06-17 LAB
EST. AVERAGE GLUCOSE BLD GHB EST-MCNC: 189 MG/DL
HBA1C MFR BLD: 8.2 %

## 2023-06-19 ENCOUNTER — OFFICE VISIT (OUTPATIENT)
Dept: FAMILY MEDICINE CLINIC | Facility: CLINIC | Age: 43
End: 2023-06-19
Payer: COMMERCIAL

## 2023-06-19 VITALS
BODY MASS INDEX: 31.83 KG/M2 | HEIGHT: 62 IN | HEART RATE: 80 BPM | OXYGEN SATURATION: 98 % | DIASTOLIC BLOOD PRESSURE: 72 MMHG | WEIGHT: 173 LBS | TEMPERATURE: 98.2 F | SYSTOLIC BLOOD PRESSURE: 114 MMHG

## 2023-06-19 DIAGNOSIS — E78.5 HYPERLIPIDEMIA ASSOCIATED WITH TYPE 2 DIABETES MELLITUS (HCC): ICD-10-CM

## 2023-06-19 DIAGNOSIS — N64.89 RADIAL SCAR OF RIGHT BREAST: ICD-10-CM

## 2023-06-19 DIAGNOSIS — I48.0 PAROXYSMAL ATRIAL FIBRILLATION (HCC): ICD-10-CM

## 2023-06-19 DIAGNOSIS — E11.69 HYPERLIPIDEMIA ASSOCIATED WITH TYPE 2 DIABETES MELLITUS (HCC): ICD-10-CM

## 2023-06-19 DIAGNOSIS — E11.9 TYPE 2 DIABETES MELLITUS WITHOUT COMPLICATION, WITHOUT LONG-TERM CURRENT USE OF INSULIN (HCC): Primary | ICD-10-CM

## 2023-06-19 PROCEDURE — 99214 OFFICE O/P EST MOD 30 MIN: CPT | Performed by: FAMILY MEDICINE

## 2023-06-19 NOTE — PATIENT INSTRUCTIONS
Will start Ozempic and titrate up  Once you start the Ozempic stop the Januvia  Continue Jardiance  Check with surgeon as to whether physical day is sufficient for preoperative clearance  Also check with the surgeon if they would like another A1c in 4 to 5 weeks prior to your surgery to verify if improving with switch to Ozempic

## 2023-06-19 NOTE — PROGRESS NOTES
[address]        NAME: Mari Pride is a 40 y o  female  : 1980    MRN: [id number]  DATE: 2023  TIME: 4:02 PM    Assessment and Plan   Type 2 diabetes mellitus without complication, without long-term current use of insulin ([facility] ) [E11 9]  1  Type 2 diabetes mellitus without complication, without long-term current use of insulin (HCC)  semaglutide, 0 25 or 0 5 mg/dose, (Ozempic, 0 25 or 0 5 MG/DOSE,) 2 mg/3 mL injection pen      2  Hyperlipidemia associated with type 2 diabetes mellitus (HCC)        3  Paroxysmal atrial fibrillation (HCC)        4  Radial scar of right breast            No problem-specific Assessment & Plan notes found for this encounter  Patient Instructions     Patient Instructions   Will start Ozempic and titrate up  Once you start the Ozempic stop the Januvia  Continue Jardiance  Check with surgeon as to whether physical day is sufficient for preoperative clearance  Also check with the surgeon if they would like another A1c in 4 to 5 weeks prior to your surgery to verify if improving with switch to Ozempic  Chief Complaint     Chief Complaint   Patient presents with   • Follow-up     Medication management           History of Present Illness       Patient comes in today for follow-up on diabetes from recent labs  Patient also due to have radial scar removed from right breast   This is planned for   Diabetes-A1c better but still at 8 2 %   A m  sugars are generally below 150 at this point  Review of Systems   Review of Systems   Constitutional: Negative for activity change, appetite change, diaphoresis and fatigue  HENT: Negative for congestion, sinus pressure and sore throat  Respiratory: Negative for cough, chest tightness, shortness of breath and wheezing  Cardiovascular: Negative for chest pain, palpitations and leg swelling          Fast or slow heart rate   Gastrointestinal: Negative for abdominal pain, blood in stool, constipation, diarrhea, nausea and vomiting  Genitourinary: Negative for difficulty urinating, dysuria, frequency and hematuria  Musculoskeletal: Negative for arthralgias, gait problem, joint swelling and myalgias  Neurological: Negative for dizziness, light-headedness and headaches  Psychiatric/Behavioral: Negative for agitation, confusion, dysphoric mood and sleep disturbance  The patient is not nervous/anxious  Current Medications       Current Outpatient Medications:   •  atorvastatin (LIPITOR) 20 mg tablet, TAKE 1 TABLET BY MOUTH EVERY DAY, Disp: 30 tablet, Rfl: 5  •  Blood Glucose Monitoring Suppl (ONE TOUCH ULTRA 2) w/Device KIT, USE TO TEST BLOOD SUGAR, Disp: 1 each, Rfl: 0  •  Empagliflozin (Jardiance) 10 MG TABS tablet, Take 1 tablet (10 mg total) by mouth in the morning, Disp: 30 tablet, Rfl: 3  •  flecainide (TAMBOCOR) 100 mg tablet, , Disp: , Rfl:   •  glucose blood (OneTouch Ultra) test strip, Test once daily, Disp: 100 each, Rfl: 3  •  Lancets (OneTouch Delica Plus [id number]) MISC, TEST 1-2 TIMES A DAY, Disp: 100 each, Rfl: 3  •  metoprolol succinate (TOPROL-XL) 25 mg 24 hr tablet, Take 25 mg by mouth daily, Disp: , Rfl:   •  semaglutide, 0 25 or 0 5 mg/dose, (Ozempic, 0 25 or 0 5 MG/DOSE,) 2 mg/3 mL injection pen, Inject 0 38 mL (0 2533 mg total) under the skin every 7 days for 28 days, THEN 0 75 mL (0 5 mg total) every 7 days  , Disp: 3 mL, Rfl: 0  •  aspirin 81 MG tablet, Take 81 mg by mouth daily (Patient not taking: Reported on 6/13/2023), Disp: , Rfl:     Current Allergies     Allergies as of 06/19/2023   • (No Known Allergies)            The following portions of the patient's history were reviewed and updated as appropriate: allergies, current medications, past family history, past medical history, past social history, past surgical history and problem list      Past Medical History:   Diagnosis Date   • Adnexal cyst     left   • Basal cell carcinoma "09/28/2022    left interauricular ear   • Elevated LFTs 06/01/2020   • History of atrial fibrillation     ablation done, takes metoprolol   • Irregular periods    • Melanoma ([address] ) 2022   • Varicella        Past Surgical History:   Procedure Laterality Date   • BREAST BIOPSY Right 02/02/2023   • CARDIAC ELECTROPHYSIOLOGY STUDY AND ABLATION     • CATARACT EXTRACTION     • EYE SURGERY     • MOHS SURGERY Left 11/22/2022    [facility] left interauricular ear-Dr Workman   • OTHER SURGICAL HISTORY      catheter ablation atrial fibrillation   • OVARIAN CYST SURGERY Right 02/02/2018    Procedure: LAPAROSCOPIC PARAOVARIAN  CYSTECTOMY;  Surgeon: Anabella Allison MD;  Location: [facility];  Service: Gynecology   • SKIN BIOPSY     • US GUIDED BREAST BIOPSY RIGHT COMPLETE Right 02/02/2023       Family History   Problem Relation Age of Onset   • No Known Problems Mother    • Heart disease Father    • Colon cancer Maternal Uncle 58   • Ovarian cancer Paternal Aunt 64   • Hypertension Maternal Grandmother    • Bone cancer Maternal Grandmother 80   • No Known Problems Maternal Grandfather    • No Known Problems Paternal Grandmother    • No Known Problems Paternal Grandfather    • Heart disease Family    • Cancer Family    • Thyroid disease Family    • Substance Abuse Neg Hx    • Mental illness Neg Hx          Medications have been verified  Objective   /72   Pulse 80   Temp 98 2 °F (36 8 °C) (Tympanic)   Ht 5' 2\" (1 575 m)   Wt 78 5 kg (173 lb)   SpO2 98%   BMI 31 64 kg/m²        Physical Exam     Physical Exam  Vitals reviewed  Constitutional:       General: She is not in acute distress  Appearance: She is well-developed  She is not ill-appearing  HENT:      Head: Normocephalic and atraumatic  Right Ear: Tympanic membrane and external ear normal  No drainage  Left Ear: Tympanic membrane normal  No drainage  Nose: No congestion        Mouth/Throat:      Pharynx: No oropharyngeal exudate or posterior " oropharyngeal erythema  Eyes:      General:         Right eye: No discharge  Left eye: No discharge  Extraocular Movements: Extraocular movements intact  Conjunctiva/sclera: Conjunctivae normal       Pupils: Pupils are equal, round, and reactive to light  Neck:      Thyroid: No thyromegaly  Cardiovascular:      Rate and Rhythm: Normal rate and regular rhythm  Heart sounds: Normal heart sounds  Pulmonary:      Effort: Pulmonary effort is normal  No respiratory distress  Breath sounds: No wheezing or rales  Musculoskeletal:      Cervical back: Normal range of motion and neck supple  Lymphadenopathy:      Cervical: No cervical adenopathy  Neurological:      Mental Status: She is alert

## 2023-06-20 ENCOUNTER — TELEPHONE (OUTPATIENT)
Dept: SURGICAL ONCOLOGY | Facility: CLINIC | Age: 43
End: 2023-06-20

## 2023-06-20 DIAGNOSIS — E11.9 TYPE 2 DIABETES MELLITUS WITHOUT COMPLICATION, WITHOUT LONG-TERM CURRENT USE OF INSULIN (HCC): ICD-10-CM

## 2023-06-20 LAB
ATRIAL RATE: 63 BPM
P AXIS: 42 DEGREES
PR INTERVAL: 200 MS
QRS AXIS: 72 DEGREES
QRSD INTERVAL: 90 MS
QT INTERVAL: 446 MS
QTC INTERVAL: 456 MS
T WAVE AXIS: 46 DEGREES
VENTRICULAR RATE: 63 BPM

## 2023-06-20 PROCEDURE — 93010 ELECTROCARDIOGRAM REPORT: CPT | Performed by: INTERNAL MEDICINE

## 2023-06-20 RX ORDER — EMPAGLIFLOZIN 10 MG/1
TABLET, FILM COATED ORAL
Qty: 30 TABLET | Refills: 3 | Status: SHIPPED | OUTPATIENT
Start: 2023-06-20

## 2023-06-20 NOTE — TELEPHONE ENCOUNTER
Recommendation from Dr Charissa Overton and Dr Katelin Clayton for pt to have cardiac clearance prior to surgery  Attempted calling Lucita Farnsworth, no answer, left her a detailed message regarding needed clearance  Clearance form faxed to [facility]

## 2023-06-26 ENCOUNTER — TELEPHONE (OUTPATIENT)
Dept: SURGICAL ONCOLOGY | Facility: CLINIC | Age: 43
End: 2023-06-26

## 2023-06-26 DIAGNOSIS — E11.9 TYPE 2 DIABETES MELLITUS WITHOUT COMPLICATION, WITHOUT LONG-TERM CURRENT USE OF INSULIN (HCC): Primary | ICD-10-CM

## 2023-06-26 RX ORDER — DULAGLUTIDE 0.75 MG/.5ML
0.75 INJECTION, SOLUTION SUBCUTANEOUS
Qty: 2 ML | Refills: 1 | Status: SHIPPED | OUTPATIENT
Start: 2023-06-26

## 2023-06-26 NOTE — TELEPHONE ENCOUNTER
Received cardiology clearance from [facility]  Kady Garcia and reviewed their recommendations of holding Aspirin 7 days prior to surgery and to take her cardiac medications as she does  She verbalized understanding

## 2023-07-07 ENCOUNTER — HOSPITAL ENCOUNTER (OUTPATIENT)
Dept: ULTRASOUND IMAGING | Facility: CLINIC | Age: 43
Discharge: HOME | End: 2023-07-07
Payer: COMMERCIAL

## 2023-07-07 ENCOUNTER — HOSPITAL ENCOUNTER (OUTPATIENT)
Dept: MAMMOGRAPHY | Facility: CLINIC | Age: 43
Discharge: HOME | End: 2023-07-07
Payer: COMMERCIAL

## 2023-07-07 VITALS — SYSTOLIC BLOOD PRESSURE: 105 MMHG | HEART RATE: 61 BPM | DIASTOLIC BLOOD PRESSURE: 66 MMHG

## 2023-07-07 DIAGNOSIS — N64.89 RADIAL SCAR OF RIGHT BREAST: ICD-10-CM

## 2023-07-07 DIAGNOSIS — R92.8 ABNORMAL MAMMOGRAM: ICD-10-CM

## 2023-07-07 PROCEDURE — 19285 PERQ DEV BREAST 1ST US IMAG: CPT

## 2023-07-07 RX ORDER — LIDOCAINE HYDROCHLORIDE 10 MG/ML
5 INJECTION, SOLUTION EPIDURAL; INFILTRATION; INTRACAUDAL; PERINEURAL ONCE
Status: DISCONTINUED | OUTPATIENT
Start: 2023-07-07 | End: 2023-07-08 | Stop reason: HOSPADM

## 2023-07-07 NOTE — PROGRESS NOTES
Ice pack given:    __X___yes _____no    Discharge instructions reviewed and given to patient:    __X___yes _____no    Discharged via:    __X___amulatory    _____wheelchair    _____stretcher    Stable on discharge:    __X___yes ____no

## 2023-07-07 NOTE — PROGRESS NOTES
Procedure type:    __x___ultrasound guided _____stereotactic    Breast:    _____Left ___x__Right    Location: 10 o'clock 2cmfn    Needle:16G rodolfo     # of passes: 1    Clip: 7.5 cm [employer]    Performed by: Dr. Mary Clemente held for 5 minutes by: Jessika Ramirez    Sterbrendon Strips:    ___X__yes _____no    Band aid:    __X___yes_____no    Tolerated procedure:    __X___yes _____no

## 2023-07-10 NOTE — PROGRESS NOTES
Post procedure call completed 07/10/23 @ 0913   Bleeding: _____yes __X___no    Pain: _____yes ___X___no    Redness/Swelling: ______yes ___X___no    Band aid removed: _____yes ___X__no (discussed removing when she showers)    Steri-Strips intact: ___X___yes _____no (discussed with patient to remove steri strips on . .. if they have not come off on their own)    Pt with no questions at this time, adv will call when results available, adv to call with any questions or concerns, has name/# for contact

## 2023-07-18 ENCOUNTER — ANESTHESIA EVENT (OUTPATIENT)
Dept: PERIOP | Facility: HOSPITAL | Age: 43
End: 2023-07-18
Payer: COMMERCIAL

## 2023-07-21 ENCOUNTER — TELEPHONE (OUTPATIENT)
Dept: FAMILY MEDICINE CLINIC | Facility: CLINIC | Age: 43
End: 2023-07-21

## 2023-07-21 DIAGNOSIS — E11.9 TYPE 2 DIABETES MELLITUS WITHOUT COMPLICATION, WITHOUT LONG-TERM CURRENT USE OF INSULIN (HCC): ICD-10-CM

## 2023-07-21 RX ORDER — ATORVASTATIN CALCIUM 20 MG/1
TABLET, FILM COATED ORAL
Qty: 30 TABLET | Refills: 5 | Status: SHIPPED | OUTPATIENT
Start: 2023-07-21

## 2023-07-21 NOTE — TELEPHONE ENCOUNTER
Patient states her sugar has been running lower. Not normal but it has been running lower than previous. Lately she's been getting 118 or 120, but she was informed she needs to be around 100. No further concerns in regards to the medication.

## 2023-07-21 NOTE — TELEPHONE ENCOUNTER
----- Message from Yaz Haynes MD sent at 7/20/2023  4:44 PM EDT -----  Regarding: FW: Upcoming surgery. Hemoglobin A1c  Patient want to go get a hemoglobin A1c early next week prior to his surgery or stop in here and have 1 done in the office. How will have her sugars been running with the medication change we made last month.  ----- Message -----  From: Sharyle Rosales, MD  Sent: 6/20/2023  10:57 AM EDT  To: Yaz Haynes MD  Subject: RE: Upcoming surgery. Thanks for your input. With an HgbA1c over 8 and a change in medication, we wait at least 2 weeks so she is good for surgery as scheduled. We will reach out to cardiology as well.   ----- Message -----  From: Yaz Haynes MD  Sent: 6/19/2023   5:44 PM EDT  To: Sharyle Rosales, MD  Subject: Upcoming surgery. Patient in the office today. A1c although improved still at 8.2%. I did adjust medications and put her on Ozempic. Generally I would not check an A1c for at least 3 months. You have surgery scheduled for July 28, 2023. I have 2 questions, what will be the absolute acceptable A1c before you will do surgery or would you postpone is still above 8? I saw him today which normally surgical clearance as we do within 30 days. We will my exam today suffice for any medical clearance that you would require. She also has atrial fibrillation which I would have cardiology sign off prior to surgery.

## 2023-07-25 RX ORDER — FAMOTIDINE 20 MG/1
20 TABLET, FILM COATED ORAL
COMMUNITY

## 2023-07-25 NOTE — PRE-PROCEDURE INSTRUCTIONS
Pre-Surgery Instructions:   Medication Instructions   • aspirin 81 MG tablet Instructions provided by MD   • atorvastatin (LIPITOR) 20 mg tablet Take night before surgery   • dulaglutide (Trulicity) 3.67 RM/4.1YR injection Per pt takes every Monday -last dose 7/24/23   • famotidine (PEPCID) 20 mg tablet Uses PRN- DO NOT take day of surgery-if needed takes at HS   • flecainide (TAMBOCOR) 100 mg tablet Instructions provided by MDpt instructed and understood to take DOS with sip of water as per cardiology   • Jardiance 10 MG TABS tablet 7/25 message to anesthesia   • metoprolol succinate (TOPROL-XL) 25 mg 24 hr tablet Take night before surgery   • Probiotic Product (PROBIOTIC DAILY PO) Stop taking 7 days prior to surgery. Medication instructions for day surgery reviewed. Please use only a sip of water to take your instructed medications. Avoid all over the counter vitamins, supplements and NSAIDS for one week prior to surgery per anesthesia guidelines. Tylenol is ok to take as needed. You will receive a call one business day prior to surgery with an arrival time and hospital directions. If your surgery is scheduled on a Monday, the hospital will be calling you on the Friday prior to your surgery. If you have not heard from anyone by 8pm, please call the hospital supervisor through the hospital  at [phone]. [employer] [phone]). Do not eat or drink anything after midnight the night before your surgery, including candy, mints, lifesavers, or chewing gum. Do not drink alcohol 24hrs before your surgery. Try not to smoke at least 24hrs before your surgery. Follow the pre surgery showering instructions as listed in the [facility] Surgical Experience Booklet” or otherwise provided by your surgeon's office. Do not shave the surgical area 24 hours before surgery. Do not apply any lotions, creams, including makeup, cologne, deodorant, or perfumes after showering on the day of your surgery.      No contact lenses, eye make-up, or artificial eyelashes. Remove nail polish, including gel polish, and any artificial, gel, or acrylic nails if possible. Remove all jewelry including rings and body piercing jewelry. Wear causal clothing that is easy to take on and off. Consider your type of surgery. Keep any valuables, jewelry, piercings at home. Please bring any specially ordered equipment (sling, braces) if indicated. Arrange for a responsible person to drive you to and from the hospital on the day of your surgery. Visitor Guidelines discussed. Call the surgeon's office with any new illnesses, exposures, or additional questions prior to surgery. Please reference your [facility] Surgical Experience Booklet” for additional information to prepare for your upcoming surgery.

## 2023-07-27 ENCOUNTER — OFFICE VISIT (OUTPATIENT)
Dept: FAMILY MEDICINE CLINIC | Facility: CLINIC | Age: 43
End: 2023-07-27
Payer: COMMERCIAL

## 2023-07-27 ENCOUNTER — TELEPHONE (OUTPATIENT)
Dept: ADMINISTRATIVE | Facility: OTHER | Age: 43
End: 2023-07-27

## 2023-07-27 VITALS
HEART RATE: 80 BPM | BODY MASS INDEX: 31.87 KG/M2 | SYSTOLIC BLOOD PRESSURE: 116 MMHG | DIASTOLIC BLOOD PRESSURE: 78 MMHG | WEIGHT: 173.2 LBS | HEIGHT: 62 IN | OXYGEN SATURATION: 94 %

## 2023-07-27 DIAGNOSIS — I48.0 PAROXYSMAL ATRIAL FIBRILLATION (HCC): ICD-10-CM

## 2023-07-27 DIAGNOSIS — N64.89 RADIAL SCAR OF RIGHT BREAST: ICD-10-CM

## 2023-07-27 DIAGNOSIS — E11.9 TYPE 2 DIABETES MELLITUS WITHOUT COMPLICATION, WITHOUT LONG-TERM CURRENT USE OF INSULIN (HCC): ICD-10-CM

## 2023-07-27 DIAGNOSIS — Z01.818 PRE-OP EXAMINATION: Primary | ICD-10-CM

## 2023-07-27 LAB — SL AMB POCT HEMOGLOBIN AIC: 7.3 (ref ?–6.5)

## 2023-07-27 PROCEDURE — 83036 HEMOGLOBIN GLYCOSYLATED A1C: CPT | Performed by: FAMILY MEDICINE

## 2023-07-27 PROCEDURE — 99214 OFFICE O/P EST MOD 30 MIN: CPT | Performed by: FAMILY MEDICINE

## 2023-07-27 NOTE — TELEPHONE ENCOUNTER
----- Message from Brittany Sloan MA sent at 7/27/2023 10:19 AM EDT -----  Regarding: Glomerular Filtration Rate Care Gap Request  07/27/23 10:19 AM    Hello, our patient attached above has had Glomerular Filtration Rate (GFR) completed/performed. Please assist in updating the patient chart by pulling the document from LAB Tab within Chart Review. The date of service is 06/16/23 -- Comprehensive Metabolic Panel contains a GFR, please update.     Thank you,  NEW Mackenzie  [facility]

## 2023-07-27 NOTE — PROGRESS NOTES
[facility]        NAME: Sridhar Pemberton is a 40 y.o. female  : 1980    MRN: [id number]  DATE: 2023  TIME: 11:35 AM    Assessment and Plan   Pre-op examination [Z01.818]  1. Pre-op examination        2. Radial scar of right breast        3. Type 2 diabetes mellitus without complication, without long-term current use of insulin (HCC)  Albumin / creatinine urine ratio    Basic metabolic panel    Hemoglobin A1C    Albumin / creatinine urine ratio    Basic metabolic panel    Hemoglobin A1C      4. Paroxysmal atrial fibrillation (HCC)            No problem-specific Assessment & Plan notes found for this encounter. Patient Instructions     Patient Instructions   Patient's A1c in the office today is 7.3. Fasting sugars have been running between 110-130 for the last 4 to 5 weeks. As his A1c was only done 5 weeks after reaming change I suspect her sugar control is better than the 7.3 A1c. I do not think will be a problem with surgical healing. I would cleared for proposed breast surgery. Separate cardiac clearance has already been obtained. Chief Complaint     Chief Complaint   Patient presents with   • Diabetes     Needs DM foot exam, MicroAlbumin, urine         History of Present Illness       Patient comes back in today at request of operating surgeon for planned right breast procedure by Dr. Marni Barfield. Last A1c was above 8. We had adjusted her medication and A1c today in the office is 7.3%. This shows great improvement. As last A1c was only 5 weeks ago, I suspect her sugar control is better than represented by this A1c. I do not expect this to have significant effect on surgical healing. Patient has separate cardiac clearance due to her atrial fibrillation heart issues. Review of Systems   Review of Systems   Constitutional: Negative for activity change, appetite change, diaphoresis and fatigue.    HENT: Negative for congestion, sinus pressure and sore throat. Respiratory: Negative for cough, chest tightness, shortness of breath and wheezing. Cardiovascular: Negative for chest pain, palpitations and leg swelling. Fast or slow heart rate   Gastrointestinal: Negative for abdominal pain, blood in stool, constipation, diarrhea, nausea and vomiting. Genitourinary: Negative for difficulty urinating, dysuria, frequency and hematuria. Musculoskeletal: Negative for arthralgias, gait problem, joint swelling and myalgias. Neurological: Negative for dizziness, light-headedness and headaches. Psychiatric/Behavioral: Negative for agitation, confusion, dysphoric mood and sleep disturbance. The patient is not nervous/anxious.           Current Medications       Current Outpatient Medications:   •  atorvastatin (LIPITOR) 20 mg tablet, TAKE 1 TABLET BY MOUTH EVERY DAY (Patient taking differently: daily at bedtime), Disp: 30 tablet, Rfl: 5  •  Blood Glucose Monitoring Suppl (ONE TOUCH ULTRA 2) w/Device KIT, USE TO TEST BLOOD SUGAR, Disp: 1 each, Rfl: 0  •  dulaglutide (Trulicity) 5.49 NW/0.9QB injection, Inject 0.5 mL (0.75 mg total) under the skin every 7 days (Patient taking differently: Inject 0.75 mg under the skin every 7 days Pt take on Mondays), Disp: 2 mL, Rfl: 1  •  famotidine (PEPCID) 20 mg tablet, Take 20 mg by mouth daily at bedtime as needed for heartburn, Disp: , Rfl:   •  flecainide (TAMBOCOR) 100 mg tablet, 2 (two) times a day Per pt only takes at bedtime---, Disp: , Rfl:   •  glucose blood (OneTouch Ultra) test strip, Test once daily, Disp: 100 each, Rfl: 3  •  Lancets (OneTouch Delica Plus [id number]) MISC, TEST 1-2 TIMES A DAY, Disp: 100 each, Rfl: 3  •  metoprolol succinate (TOPROL-XL) 25 mg 24 hr tablet, Take 25 mg by mouth daily at bedtime, Disp: , Rfl:     Current Allergies     Allergies as of 07/27/2023   • (No Known Allergies)            The following portions of the patient's history were reviewed and updated as appropriate: allergies, current medications, past family history, past medical history, past social history, past surgical history and problem list.     Past Medical History:   Diagnosis Date   • Adnexal cyst     left "fallopian tube"   • Anesthesia     per pt "sometimes oxygen level drops very low" not all the time -has occurred more than once under anesthesia"   • Atrial fibrillation ([address])     [facility] Dr Driscoll Miss pt "did not have ablation and takes flecanide and metoprolol for it"   • Basal cell carcinoma 09/28/2022    left interauricular ear   • Dental crowns present    • Diabetes mellitus ([address])     type 2   • Elevated LFTs 06/01/2020   • GERD (gastroesophageal reflux disease)    • Irregular periods    • Melanoma ([address]) 2022   • Varicella        Past Surgical History:   Procedure Laterality Date   • BREAST BIOPSY Right 02/02/2023   • CARDIAC ELECTROPHYSIOLOGY STUDY AND ABLATION      per pt "reports did not have this surgery"   • CATARACT EXTRACTION Bilateral    • EYE SURGERY Left     [facility] surgery [facility]   • MOHS SURGERY Left 11/22/2022    [facility] left interauricular ear-Dr Workman--per pt "a mole removed not in ear-on scalp"   • OVARIAN CYST SURGERY Right 02/02/2018    Procedure: LAPAROSCOPIC PARAOVARIAN  CYSTECTOMY;  Surgeon: Omid Law MD;  Location: [facility] OR;  Service: Gynecology   • SKIN BIOPSY     • TONSILLECTOMY      and adenoids   • US BREAST KRISTIAN  NEEDLE LOC RIGHT Right 07/07/2023   • US GUIDED BREAST BIOPSY RIGHT COMPLETE Right 02/02/2023       Family History   Problem Relation Age of Onset   • No Known Problems Mother    • Heart disease Father    • Colon cancer Maternal Uncle 58   • Ovarian cancer Paternal Aunt 64   • Hypertension Maternal Grandmother    • Bone cancer Maternal Grandmother 80   • No Known Problems Maternal Grandfather    • No Known Problems Paternal Grandmother    • No Known Problems Paternal Grandfather    • Heart disease Family    • Cancer Family    • Thyroid disease Family    • Substance Abuse Neg Hx    • Mental illness Neg Hx          Medications have been verified. Objective   /78   Pulse 80   Ht 5' 1.5" (1.562 m)   Wt 78.6 kg (173 lb 3.2 oz)   SpO2 94%   BMI 32.20 kg/m²        Physical Exam     Physical Exam  Vitals reviewed. Constitutional:       General: She is not in acute distress. Appearance: She is well-developed. She is not diaphoretic. HENT:      Head: Normocephalic and atraumatic. Right Ear: Tympanic membrane, ear canal and external ear normal.      Left Ear: Tympanic membrane, ear canal and external ear normal.      Nose: Nose normal. No mucosal edema or rhinorrhea. Right Sinus: No maxillary sinus tenderness. Left Sinus: No maxillary sinus tenderness. Mouth/Throat:      Mouth: Mucous membranes are moist. Mucous membranes are not pale and not dry. Dentition: Normal dentition. Pharynx: Uvula midline. No oropharyngeal exudate. Eyes:      General:         Right eye: No discharge. Left eye: No discharge. Extraocular Movements: Extraocular movements intact. Conjunctiva/sclera: Conjunctivae normal.      Pupils: Pupils are equal, round, and reactive to light. Neck:      Thyroid: No thyromegaly. Cardiovascular:      Rate and Rhythm: Normal rate and regular rhythm. Pulses: no weak pulses          Dorsalis pedis pulses are 2+ on the right side and 2+ on the left side. Posterior tibial pulses are 2+ on the right side and 2+ on the left side. Heart sounds: Normal heart sounds. No murmur heard. Pulmonary:      Effort: Pulmonary effort is normal. No respiratory distress. Breath sounds: Normal breath sounds. No wheezing or rales. Musculoskeletal:      Cervical back: Normal range of motion and neck supple. Right lower leg: No edema. Left lower leg: No edema.    Feet:      Right foot:      Skin integrity: No ulcer, skin breakdown, erythema, warmth, callus or dry skin.      Left foot:      Skin integrity: No ulcer, skin breakdown, erythema, warmth, callus or dry skin. Lymphadenopathy:      Cervical: No cervical adenopathy. Neurological:      Mental Status: She is alert and oriented to person, place, and time. Cranial Nerves: No cranial nerve deficit. Psychiatric:         Mood and Affect: Mood normal.         Behavior: Behavior normal.         Patient's shoes and socks removed. Right Foot/Ankle   Right Foot Inspection  Skin Exam: skin normal and skin intact. No dry skin, no warmth, no callus, no erythema, no maceration, no abnormal color, no pre-ulcer, no ulcer and no callus. Sensory   Vibration: intact  Proprioception: intact  Monofilament testing: intact    Vascular  Capillary refills: < 3 seconds  The right DP pulse is 2+. The right PT pulse is 2+. Left Foot/Ankle  Left Foot Inspection  Skin Exam: skin normal and skin intact. No dry skin, no warmth, no erythema, no maceration, normal color, no pre-ulcer, no ulcer and no callus. Sensory   Vibration: intact  Proprioception: intact  Monofilament testing: intact    Vascular  Capillary refills: < 3 seconds  The left DP pulse is 2+. The left PT pulse is 2+.      Assign Risk Category  No deformity present  No loss of protective sensation  No weak pulses  Risk: 0

## 2023-07-27 NOTE — PATIENT INSTRUCTIONS
Patient's A1c in the office today is 7.3. Fasting sugars have been running between 110-130 for the last 4 to 5 weeks. As his A1c was only done 5 weeks after reaming change I suspect her sugar control is better than the 7.3 A1c. I do not think will be a problem with surgical healing. I would cleared for proposed breast surgery. Separate cardiac clearance has already been obtained.

## 2023-07-28 ENCOUNTER — HOSPITAL ENCOUNTER (OUTPATIENT)
Dept: MAMMOGRAPHY | Facility: HOSPITAL | Age: 43
Discharge: HOME | End: 2023-07-28
Payer: COMMERCIAL

## 2023-07-28 ENCOUNTER — ANESTHESIA (OUTPATIENT)
Dept: PERIOP | Facility: HOSPITAL | Age: 43
End: 2023-07-28
Payer: COMMERCIAL

## 2023-07-28 ENCOUNTER — HOSPITAL ENCOUNTER (OUTPATIENT)
Facility: HOSPITAL | Age: 43
Setting detail: OUTPATIENT SURGERY
Discharge: HOME | End: 2023-07-28
Attending: SURGERY | Admitting: SURGERY
Payer: COMMERCIAL

## 2023-07-28 VITALS
DIASTOLIC BLOOD PRESSURE: 53 MMHG | OXYGEN SATURATION: 96 % | WEIGHT: 173.28 LBS | TEMPERATURE: 97.2 F | HEART RATE: 69 BPM | RESPIRATION RATE: 16 BRPM | BODY MASS INDEX: 31.89 KG/M2 | SYSTOLIC BLOOD PRESSURE: 104 MMHG | HEIGHT: 62 IN

## 2023-07-28 DIAGNOSIS — N64.89 RADIAL SCAR OF RIGHT BREAST: ICD-10-CM

## 2023-07-28 LAB
EXT PREGNANCY TEST URINE: NEGATIVE
EXT. CONTROL: NORMAL
GLUCOSE SERPL-MCNC: 196 MG/DL (ref 65–140)

## 2023-07-28 PROCEDURE — 82948 REAGENT STRIP/BLOOD GLUCOSE: CPT

## 2023-07-28 PROCEDURE — 88307 TISSUE EXAM BY PATHOLOGIST: CPT | Performed by: PATHOLOGY

## 2023-07-28 PROCEDURE — NC001 PR NO CHARGE: Performed by: SURGERY

## 2023-07-28 PROCEDURE — 81025 URINE PREGNANCY TEST: CPT | Performed by: SURGERY

## 2023-07-28 PROCEDURE — 76098 X-RAY EXAM SURGICAL SPECIMEN: CPT | Performed by: SURGERY

## 2023-07-28 PROCEDURE — 19301 PARTIAL MASTECTOMY: CPT | Performed by: PHYSICIAN ASSISTANT

## 2023-07-28 PROCEDURE — 19301 PARTIAL MASTECTOMY: CPT | Performed by: SURGERY

## 2023-07-28 RX ORDER — HYDROMORPHONE HCL/PF 1 MG/ML
0.5 SYRINGE (ML) INJECTION
Status: DISCONTINUED | OUTPATIENT
Start: 2023-07-28 | End: 2023-07-28 | Stop reason: HOSPADM

## 2023-07-28 RX ORDER — ONDANSETRON 2 MG/ML
4 INJECTION INTRAMUSCULAR; INTRAVENOUS ONCE AS NEEDED
Status: DISCONTINUED | OUTPATIENT
Start: 2023-07-28 | End: 2023-07-28 | Stop reason: HOSPADM

## 2023-07-28 RX ORDER — ROCURONIUM BROMIDE 10 MG/ML
INJECTION, SOLUTION INTRAVENOUS AS NEEDED
Status: DISCONTINUED | OUTPATIENT
Start: 2023-07-28 | End: 2023-07-28

## 2023-07-28 RX ORDER — MIDAZOLAM HYDROCHLORIDE 2 MG/2ML
INJECTION, SOLUTION INTRAMUSCULAR; INTRAVENOUS AS NEEDED
Status: DISCONTINUED | OUTPATIENT
Start: 2023-07-28 | End: 2023-07-28

## 2023-07-28 RX ORDER — MAGNESIUM HYDROXIDE 1200 MG/15ML
LIQUID ORAL AS NEEDED
Status: DISCONTINUED | OUTPATIENT
Start: 2023-07-28 | End: 2023-07-28 | Stop reason: HOSPADM

## 2023-07-28 RX ORDER — PROPOFOL 10 MG/ML
INJECTION, EMULSION INTRAVENOUS AS NEEDED
Status: DISCONTINUED | OUTPATIENT
Start: 2023-07-28 | End: 2023-07-28

## 2023-07-28 RX ORDER — LIDOCAINE HYDROCHLORIDE 20 MG/ML
INJECTION, SOLUTION EPIDURAL; INFILTRATION; INTRACAUDAL; PERINEURAL AS NEEDED
Status: DISCONTINUED | OUTPATIENT
Start: 2023-07-28 | End: 2023-07-28

## 2023-07-28 RX ORDER — FENTANYL CITRATE 50 UG/ML
INJECTION, SOLUTION INTRAMUSCULAR; INTRAVENOUS AS NEEDED
Status: DISCONTINUED | OUTPATIENT
Start: 2023-07-28 | End: 2023-07-28

## 2023-07-28 RX ORDER — FENTANYL CITRATE/PF 50 MCG/ML
50 SYRINGE (ML) INJECTION
Status: DISCONTINUED | OUTPATIENT
Start: 2023-07-28 | End: 2023-07-28 | Stop reason: HOSPADM

## 2023-07-28 RX ORDER — MEPERIDINE HYDROCHLORIDE 25 MG/ML
12.5 INJECTION INTRAMUSCULAR; INTRAVENOUS; SUBCUTANEOUS ONCE AS NEEDED
Status: DISCONTINUED | OUTPATIENT
Start: 2023-07-28 | End: 2023-07-28 | Stop reason: HOSPADM

## 2023-07-28 RX ORDER — PROMETHAZINE HYDROCHLORIDE 25 MG/ML
6.25 INJECTION, SOLUTION INTRAMUSCULAR; INTRAVENOUS ONCE AS NEEDED
Status: DISCONTINUED | OUTPATIENT
Start: 2023-07-28 | End: 2023-07-28 | Stop reason: HOSPADM

## 2023-07-28 RX ORDER — SODIUM CHLORIDE 9 MG/ML
125 INJECTION, SOLUTION INTRAVENOUS CONTINUOUS
Status: DISCONTINUED | OUTPATIENT
Start: 2023-07-28 | End: 2023-07-28 | Stop reason: HOSPADM

## 2023-07-28 RX ORDER — BUPIVACAINE HYDROCHLORIDE 5 MG/ML
INJECTION, SOLUTION EPIDURAL; INTRACAUDAL AS NEEDED
Status: DISCONTINUED | OUTPATIENT
Start: 2023-07-28 | End: 2023-07-28 | Stop reason: HOSPADM

## 2023-07-28 RX ORDER — KETOROLAC TROMETHAMINE 30 MG/ML
INJECTION, SOLUTION INTRAMUSCULAR; INTRAVENOUS AS NEEDED
Status: DISCONTINUED | OUTPATIENT
Start: 2023-07-28 | End: 2023-07-28

## 2023-07-28 RX ORDER — ONDANSETRON 2 MG/ML
INJECTION INTRAMUSCULAR; INTRAVENOUS AS NEEDED
Status: DISCONTINUED | OUTPATIENT
Start: 2023-07-28 | End: 2023-07-28

## 2023-07-28 RX ORDER — ACETAMINOPHEN 325 MG/1
650 TABLET ORAL EVERY 6 HOURS PRN
Status: DISCONTINUED | OUTPATIENT
Start: 2023-07-28 | End: 2023-07-28 | Stop reason: HOSPADM

## 2023-07-28 RX ORDER — CEFAZOLIN SODIUM 1 G/50ML
1000 SOLUTION INTRAVENOUS
Status: DISCONTINUED | OUTPATIENT
Start: 2023-07-28 | End: 2023-07-28 | Stop reason: HOSPADM

## 2023-07-28 RX ORDER — SUCCINYLCHOLINE/SOD CL,ISO/PF 100 MG/5ML
SYRINGE (ML) INTRAVENOUS AS NEEDED
Status: DISCONTINUED | OUTPATIENT
Start: 2023-07-28 | End: 2023-07-28

## 2023-07-28 RX ADMIN — ROCURONIUM BROMIDE 40 MG: 10 INJECTION, SOLUTION INTRAVENOUS at 13:43

## 2023-07-28 RX ADMIN — SODIUM CHLORIDE: 0.9 INJECTION, SOLUTION INTRAVENOUS at 13:56

## 2023-07-28 RX ADMIN — SUGAMMADEX 100 MG: 100 INJECTION, SOLUTION INTRAVENOUS at 14:41

## 2023-07-28 RX ADMIN — FENTANYL CITRATE 50 MCG: 50 INJECTION INTRAMUSCULAR; INTRAVENOUS at 13:54

## 2023-07-28 RX ADMIN — LIDOCAINE HYDROCHLORIDE 100 MG: 20 INJECTION, SOLUTION EPIDURAL; INFILTRATION; INTRACAUDAL at 13:41

## 2023-07-28 RX ADMIN — PROPOFOL 200 MG: 10 INJECTION, EMULSION INTRAVENOUS at 13:41

## 2023-07-28 RX ADMIN — Medication 100 MG: at 13:41

## 2023-07-28 RX ADMIN — SODIUM CHLORIDE 125 ML/HR: 0.9 INJECTION, SOLUTION INTRAVENOUS at 10:18

## 2023-07-28 RX ADMIN — KETOROLAC TROMETHAMINE 15 MG: 30 INJECTION, SOLUTION INTRAMUSCULAR at 14:24

## 2023-07-28 RX ADMIN — SUGAMMADEX 100 MG: 100 INJECTION, SOLUTION INTRAVENOUS at 14:47

## 2023-07-28 RX ADMIN — SUGAMMADEX 320 MG: 100 INJECTION, SOLUTION INTRAVENOUS at 14:27

## 2023-07-28 RX ADMIN — ONDANSETRON 4 MG: 2 INJECTION INTRAMUSCULAR; INTRAVENOUS at 14:22

## 2023-07-28 RX ADMIN — CEFAZOLIN SODIUM 1000 MG: 1 SOLUTION INTRAVENOUS at 13:35

## 2023-07-28 RX ADMIN — MIDAZOLAM 1 MG: 1 INJECTION INTRAMUSCULAR; INTRAVENOUS at 13:36

## 2023-07-28 NOTE — H&P
H&P Exam - Surgical Oncology   Mendez Hunt 40 y.o. female MRN: [id number]  Unit/Bed#: OR [place] Encounter: [id number]    Assessment/Plan     Assessment:  Radial scar right breast  Plan:  Surgical excision right breast    History of Present Illness   HX and PE limited by: n/a  HPI:  Mendez Hunt is a 40 y.o. female who presents with a radial scar of the right breast for surgical resection. Review of Systems   Constitutional: Negative. Negative for appetite change and fever. Eyes: Negative. Respiratory: Negative for shortness of breath. Cardiovascular: Negative. Gastrointestinal: Negative. Endocrine: Negative. Genitourinary: Negative. Musculoskeletal: Negative. Negative for arthralgias and myalgias. Skin: Negative. Allergic/Immunologic: Negative. Neurological: Negative. Hematological: Negative. Negative for adenopathy. Does not bruise/bleed easily. Psychiatric/Behavioral: Negative.         Historical Information   Past Medical History:   Diagnosis Date   • Adnexal cyst     left "fallopian tube"   • Anesthesia     per pt "sometimes oxygen level drops very low" not all the time -has occurred more than once under anesthesia"   • Atrial fibrillation (HCC)     [facility] cardio Dr Juancarlos Mcintosh pt "did not have ablation and takes flecanide and metoprolol for it"   • Basal cell carcinoma 09/28/2022    left interauricular ear   • Dental crowns present    • Diabetes mellitus ([address])     type 2   • Elevated LFTs 06/01/2020   • GERD (gastroesophageal reflux disease)    • GERD (gastroesophageal reflux disease)    • Irregular periods    • Melanoma ([address]) 2022   • Varicella      Past Surgical History:   Procedure Laterality Date   • BREAST BIOPSY Right 02/02/2023   • CARDIAC ELECTROPHYSIOLOGY STUDY AND ABLATION      per pt "reports did not have this surgery"   • CATARACT EXTRACTION Bilateral    • EYE SURGERY Left     retina surgery at [facility] eye   • MOHS SURGERY Left 11/22/2022    [address] [place] [address] left interauricular ear-Dr Workman--per pt "a mole removed not in ear-on scalp"   • OVARIAN CYST SURGERY Right 2018    Procedure: LAPAROSCOPIC PARAOVARIAN  CYSTECTOMY;  Surgeon: Omid Law MD;  Location: [facility];  Service: Gynecology   • SKIN BIOPSY     • TONSILLECTOMY      and adenoids   • US BREAST KRISTIAN  NEEDLE LOC RIGHT Right 2023   • US GUIDED BREAST BIOPSY RIGHT COMPLETE Right 2023     Social History   Social History     Substance and Sexual Activity   Alcohol Use Yes    Comment: rare--3x/year     Social History     Substance and Sexual Activity   Drug Use No     Social History     Tobacco Use   Smoking Status Former   • Packs/day: 1.50   • Years: 14.00   • Total pack years: 21.00   • Types: Cigarettes   • Start date:    • Quit date: 2012   • Years since quittin.5   Smokeless Tobacco Never     E-Cigarette/Vaping   • E-Cigarette Use Never User      E-Cigarette/Vaping Substances   • Nicotine No    • THC No    • CBD No    • Flavoring No      Family History   Problem Relation Age of Onset   • No Known Problems Mother    • Heart disease Father    • Colon cancer Maternal Uncle 58   • Ovarian cancer Paternal Aunt 62   • Hypertension Maternal Grandmother    • Bone cancer Maternal Grandmother 80   • No Known Problems Maternal Grandfather    • No Known Problems Paternal Grandmother    • No Known Problems Paternal Grandfather    • Heart disease Family    • Cancer Family    • Thyroid disease Family    • Substance Abuse Neg Hx    • Mental illness Neg Hx        Meds/Allergies   all current active meds have been reviewed  No Known Allergies    Objective   Vitals: Blood pressure 109/71, pulse 76, temperature 97.8 °F (36.6 °C), temperature source Temporal, resp. rate 16, height 5' 2" (1.575 m), weight 78.6 kg (173 lb 4.5 oz), last menstrual period 2023, SpO2 95 %, not currently breastfeeding.     No intake or output data in the 24 hours ending 23 1307    Invasive Devices Peripheral Intravenous Line  Duration           Peripheral IV 02/02/18 Right Antecubital 2002 days    Peripheral IV 07/28/23 Left Hand <1 day          Drain  Duration           NG/OG/Enteral Tube Orogastric 18 Fr Center mouth 2001 days                Physical Exam  Constitutional:       General: She is not in acute distress. Appearance: Normal appearance. HENT:      Head: Normocephalic and atraumatic. Cardiovascular:      Heart sounds: Normal heart sounds. Pulmonary:      Breath sounds: Normal breath sounds. Abdominal:      Palpations: Abdomen is soft. Musculoskeletal:      Right lower leg: No edema. Left lower leg: No edema. Neurological:      Mental Status: She is alert and oriented to person, place, and time. Lab Results: I have personally reviewed pertinent lab results. Imaging: I have personally reviewed pertinent reports.     Pathology, and Other Studies: As noted    Code Status: No Order  Advance Directive and Living Will:      Power of :    POLST:      .

## 2023-07-28 NOTE — OP NOTE
OPERATIVE REPORT  PATIENT NAME: Adrienne Woodward    :  1980  MRN: [id number]  Pt Location: [facility] OR ROOM 05    SURGERY DATE: 2023    Surgeon(s) and Role:     * Robert Chappell MD - Primary     * Aryan Dumont PA-C - Assisting    Preop Diagnosis:  Radial scar of right breast [N64.89]    Post-Op Diagnosis Codes:     * Radial scar of right breast [N64.89]    Procedure(s):  Right - LUMPECTOMY BREAST KRISTIAN LOCALIZED  Use of Kristian   Specimen radiograph    Specimen(s):  ID Type Source Tests Collected by Time Destination   1 : Right breast lumpectomy suture marks long lateral short superior Tissue Breast, Right TISSUE EXAM Robert Chappell MD 2023 1406    2 : Right breast  tissue anterior margin suture marks true margin Tissue Breast, Right TISSUE EXAM Robert Chappell MD 2023 1407    3 : Right breast  tissue lateral margin suture marks true margin Tissue Breast, Right TISSUE EXAM Robert Chappell MD 2023 1412    4 : Right breast  tissue posterior margin suture marks true margin Tissue Breast, Right TISSUE EXAM Robert Chappell MD 2023 1416        Estimated Blood Loss:   Minimal    Drains:  NG/OG/Enteral Tube Orogastric 18 Fr Center mouth (Active)   Number of days:        Anesthesia Type:   General    Operative Indications:  Radial scar of right breast [N64.89]      Operative Findings:  Kristian reflector in specimen    Complications:   None    Procedure and Technique:  Donald Ray is a 41-year-old female who presented with radial scar on diagnostic imaging and biopsy. She was counseled on surgical excision. She had a KRISTIAN reflector placed for localization purposes. She presented the day of surgery to the operating room. She had preoperative antibiotics. She was administered general anesthesia. She was prepped and draped in the usual standard fashion. Timeout was performed. Attention was turned to the upper outer right breast.  The savvy probe was used to identify the reflector.   The skin was marked in this location. Half percent Marcaine plain was injected for local anesthesia. A circumareolar incision was created. Electrocautery was used to dissect deep and slightly lateral to the area of concern which was elevated into the wound. A margin of tissue was excised in all directions. The savvy probe was used throughout to help guide dissection. The specimen was marked with a short stitch superior and a long stitch lateral.  The specimen was interrogated to confirm reflector removal.  It was also imaged in the operating room revealing Sun reflector at the anterior/lateral aspect of the specimen. Therefore a new anterior and new lateral margin were excised. Sutures were placed on the true margins. The standard clip was not present on the specimen radiograph. There was a questionable metallic item seen slightly deep to the resection area which was excised and submitted as a posterior margin. However on imaging the standard clip was clearly co-located to immediately anterior to the SUN. All breast specimens were submitted to pathology in formalin. Her wound was irrigated and hemostasis was achieved. The wound was then closed in a layered fashion using multiple interrupted 3-0 Monocryl suture and a running 4-0 Monocryl subcuticular stitch. All counts were correct. The skin was cleaned and dried. Surgical glue, fluffs and a bra were applied. The patient was then extubated and taken to recovery in stable condition. A physician assistant was required during the procedure for retraction, tissue handling, dissection and suturing; no residents were available.     Patient Disposition:  extubated and stable    This procedure was not performed to treat breast cancer through sentinel node biopsy      SIGNATURE: Margret Gordon MD  DATE: July 28, 2023  TIME: 2:32 PM

## 2023-07-28 NOTE — DISCHARGE INSTR - AVS FIRST PAGE
POST-OPERATIVE CARE INSTRUCTIONS       Care after your procedure:   General  Rest and relax for 24 hours, then gradually return to normal activities. Do not preform any heavy lifting or strenuous physical activities for 14 days. Your activity restrictions will be re-evaluated at your post op visit. Drink clear liquids until you are certain there is no nausea, then resume a normal diet. Do not drink alcohol, drive any vehicle, operate mechanical equipment or make critical decisions for at least 24 hours and until you are off any narcotic pain medications. The Incision  Your incision is closed with:   dissolvable stiches just underneath the skin. The incision is also covered with:                          clear waterproof glue  A gauze-pad is covering the wound. Wound care  Remove your gauze-pad after 24 hours. You may then shower using soap and water to clean your incision. Gently dry the wound. You may redress your wound with additional gauze and tape if you choose. A little bruising at the wound site is normal.    Medication  Resume all previous medications  Take either Naproxen (Aleve) one tablet every 8 hours or Ibuprofen(Advil/Motrin) one(1) to two(2) tablets every 6 hours around the clock for the first 2-3 days. Take this even if you don't think you need it for at least the first 24 hours. Pain Medication Instructions:may also use over the counter tylenol          Other (If applicable)  Wear a post-surgical bra around the clock. May use ice to the incision site(s) for the next 24-48 hours, twice daily.    Call your  doctor if you have any of the following:  Redness, swelling, heat, drainage, and/or bleeding from your wound  Chills or fever ( above 101' F )  Pain, not relieved with the above medications  If you have any questions or problems call our office [phone]    Follow-up appointment:  As scheduled

## 2023-07-28 NOTE — ANESTHESIA POSTPROCEDURE EVALUATION
Post-Op Assessment Note    CV Status:  Stable    Pain management: adequate     Mental Status:  Alert and awake   Hydration Status:  Euvolemic   PONV Controlled:  Controlled   Airway Patency:  Patent      Post Op Vitals Reviewed: Yes      Staff: Anesthesiologist         No notable events documented.     BP      Temp     Pulse     Resp      SpO2      /53   Pulse 69   Temp (!) 97.2 °F (36.2 °C) (Temporal)   Resp 16   Ht 5' 2" (1.575 m)   Wt 78.6 kg (173 lb 4.5 oz)   LMP 06/01/2023 (Exact Date)   SpO2 96%   Breastfeeding No   BMI 31.69 kg/m²

## 2023-07-28 NOTE — ANESTHESIA PREPROCEDURE EVALUATION
Procedure:  LUMPECTOMY BREAST KRISTIAN LOCALIZED (Right: Breast)    Relevant Problems   CARDIO   (+) Hyperlipidemia associated with type 2 diabetes mellitus (HCC)   (+) Hypertriglyceridemia   (+) Paroxysmal atrial fibrillation (HCC)      ENDO   (+) Type 2 diabetes mellitus without complication, without long-term current use of insulin (HCC)      GI/HEPATIC   (+) GERD (gastroesophageal reflux disease)        Physical Exam    Airway    Mallampati score: III  TM Distance: >3 FB  Neck ROM: full     Dental   No notable dental hx     Cardiovascular  Rhythm: regular, Rate: normal, Cardiovascular exam normal    Pulmonary  Pulmonary exam normal Breath sounds clear to auscultation,     Other Findings        Anesthesia Plan  ASA Score- 3     Anesthesia Type- general with ASA Monitors. Additional Monitors:   Airway Plan: ETT. Plan Factors-    Chart reviewed. Existing labs reviewed. Patient summary reviewed. Induction- intravenous. Postoperative Plan-     Informed Consent- Anesthetic plan and risks discussed with patient.

## 2023-08-01 PROCEDURE — 88307 TISSUE EXAM BY PATHOLOGIST: CPT | Performed by: PATHOLOGY

## 2023-08-01 NOTE — TELEPHONE ENCOUNTER
Upon review of the In Basket request we were able to identify that the patient had the requested item(s) completed internally. Internal labs/procedures/tests are not able to be linked to .  will show as updated but a hyperlink to the document is not possible at this time. The item(s) requested can be found within the Chart Review tabs.  is updated with DOS 6/16/2023    Any additional questions or concerns should be emailed to the Practice Liaisons via the appropriate education email address, please do not reply via In Basket.     Thank you  Yoanna Amin

## 2023-08-09 DIAGNOSIS — E11.9 TYPE 2 DIABETES MELLITUS WITHOUT COMPLICATION, WITHOUT LONG-TERM CURRENT USE OF INSULIN (HCC): ICD-10-CM

## 2023-08-09 RX ORDER — DULAGLUTIDE 0.75 MG/.5ML
0.75 INJECTION, SOLUTION SUBCUTANEOUS
Qty: 2 ML | Refills: 1 | Status: SHIPPED | OUTPATIENT
Start: 2023-08-09

## 2023-08-15 ENCOUNTER — OFFICE VISIT (OUTPATIENT)
Dept: SURGICAL ONCOLOGY | Facility: CLINIC | Age: 43
End: 2023-08-15

## 2023-08-15 VITALS
BODY MASS INDEX: 32.2 KG/M2 | HEART RATE: 74 BPM | HEIGHT: 62 IN | DIASTOLIC BLOOD PRESSURE: 62 MMHG | RESPIRATION RATE: 17 BRPM | TEMPERATURE: 97.9 F | SYSTOLIC BLOOD PRESSURE: 110 MMHG | OXYGEN SATURATION: 91 % | WEIGHT: 175 LBS

## 2023-08-15 DIAGNOSIS — N64.89 RADIAL SCAR OF RIGHT BREAST: Primary | ICD-10-CM

## 2023-08-15 PROCEDURE — 99024 POSTOP FOLLOW-UP VISIT: CPT | Performed by: SURGERY

## 2023-08-15 NOTE — PROGRESS NOTES
44 y.o. female is here today s/p right lumpectomy for a radial scar. She reports no concerns. Physical Exam  Constitutional:       General: She is not in acute distress. Appearance: Normal appearance. Chest:   Breasts:     Right: Skin change ( well healing incision with mild irriation superior) present. Neurological:      Mental Status: She is alert and oriented to person, place, and time. Psychiatric:         Mood and Affect: Mood normal.         Data:     7/28/2023 right lumpectomy shows benign breast tissue with ectatic ducts and focal periductal chronic inflammation, biopsy site changes    Anterior margin shows the same along with focal fat necrosis    Lateral margin again shows the same    Posterior margin shows benign breast tissue    Specimen radiograph reveals the [facility] reflector only    Diagnoses and all orders for this visit:    Radial scar of right breast  -     Mammo diagnostic bilateral w 3d & cad; Future        Assessment/Plan: 43-year-old female status post right lumpectomy for a core needle biopsy showing a radial sclerosing lesion. Her final pathology is benign revealing biopsy site changes. The specimen radiograph contained the [facility] reflector but not the standard clip. Additional margins were excised around the [facility]. She is due for mammogram in December. I will order a diagnostic mammogram in case any additional imaging will be required. I will plan to see her again in 6 months. If there are no concerns at that time, I will address her family history with her and discussed the role of genetic testing.

## 2023-08-21 ENCOUNTER — TELEPHONE (OUTPATIENT)
Dept: FAMILY MEDICINE CLINIC | Facility: CLINIC | Age: 43
End: 2023-08-21

## 2023-08-21 DIAGNOSIS — E11.9 TYPE 2 DIABETES MELLITUS WITHOUT COMPLICATION, WITHOUT LONG-TERM CURRENT USE OF INSULIN (HCC): ICD-10-CM

## 2023-08-25 DIAGNOSIS — E11.9 TYPE 2 DIABETES MELLITUS WITHOUT COMPLICATION, WITHOUT LONG-TERM CURRENT USE OF INSULIN (HCC): ICD-10-CM

## 2023-08-25 RX ORDER — BLOOD SUGAR DIAGNOSTIC
STRIP MISCELLANEOUS
Qty: 100 EACH | Refills: 3 | Status: SHIPPED | OUTPATIENT
Start: 2023-08-25

## 2023-08-25 NOTE — TELEPHONE ENCOUNTER
Pt is still taking the Jardiance 10 MG daily. Pt stopped taking Januvia 100 MG.  Pt would still like to continue to take the Jardiance medication. Pt would like this sent to the [facility] in [place] [phone].    Pt would also like glucose blood (One Touch Ultra) test strips sent to the [facility] in [place] [phone].

## 2023-10-04 DIAGNOSIS — E11.9 TYPE 2 DIABETES MELLITUS WITHOUT COMPLICATION, WITHOUT LONG-TERM CURRENT USE OF INSULIN (HCC): ICD-10-CM

## 2023-10-04 RX ORDER — DULAGLUTIDE 0.75 MG/.5ML
0.75 INJECTION, SOLUTION SUBCUTANEOUS
Qty: 2 ML | Refills: 1 | Status: SHIPPED | OUTPATIENT
Start: 2023-10-04

## 2023-10-18 LAB
ALBUMIN/CREAT UR: 4 MG/G CREAT (ref 0–29)
BUN SERPL-MCNC: 13 MG/DL (ref 6–24)
BUN/CREAT SERPL: 22 (ref 9–23)
CALCIUM SERPL-MCNC: 9.5 MG/DL (ref 8.7–10.2)
CHLORIDE SERPL-SCNC: 109 MMOL/L (ref 96–106)
CO2 SERPL-SCNC: 23 MMOL/L (ref 20–29)
CREAT SERPL-MCNC: 0.58 MG/DL (ref 0.57–1)
CREAT UR-MCNC: 75.3 MG/DL
EGFR: 116 ML/MIN/1.73
EST. AVERAGE GLUCOSE BLD GHB EST-MCNC: 146 MG/DL
GLUCOSE SERPL-MCNC: 150 MG/DL (ref 70–99)
HBA1C MFR BLD: 6.7 % (ref 4.8–5.6)
MICROALBUMIN UR-MCNC: 3.1 UG/ML
POTASSIUM SERPL-SCNC: 4.1 MMOL/L (ref 3.5–5.2)
SODIUM SERPL-SCNC: 148 MMOL/L (ref 134–144)

## 2023-10-23 LAB
LEFT EYE DIABETIC RETINOPATHY: NORMAL
RIGHT EYE DIABETIC RETINOPATHY: NORMAL

## 2023-11-21 DIAGNOSIS — E11.9 TYPE 2 DIABETES MELLITUS WITHOUT COMPLICATION, WITHOUT LONG-TERM CURRENT USE OF INSULIN (HCC): ICD-10-CM

## 2023-11-21 RX ORDER — DULAGLUTIDE 0.75 MG/.5ML
0.75 INJECTION, SOLUTION SUBCUTANEOUS
Qty: 2 ML | Refills: 1 | Status: SHIPPED | OUTPATIENT
Start: 2023-11-21

## 2023-12-08 ENCOUNTER — OFFICE VISIT (OUTPATIENT)
Dept: FAMILY MEDICINE CLINIC | Facility: HOSPITAL | Age: 43
End: 2023-12-08
Payer: COMMERCIAL

## 2023-12-08 VITALS
HEIGHT: 62 IN | OXYGEN SATURATION: 94 % | BODY MASS INDEX: 32.2 KG/M2 | DIASTOLIC BLOOD PRESSURE: 60 MMHG | WEIGHT: 175 LBS | HEART RATE: 72 BPM | SYSTOLIC BLOOD PRESSURE: 110 MMHG

## 2023-12-08 DIAGNOSIS — E11.9 TYPE 2 DIABETES MELLITUS WITHOUT COMPLICATION, WITHOUT LONG-TERM CURRENT USE OF INSULIN (HCC): ICD-10-CM

## 2023-12-08 DIAGNOSIS — E78.5 HYPERLIPIDEMIA ASSOCIATED WITH TYPE 2 DIABETES MELLITUS: ICD-10-CM

## 2023-12-08 DIAGNOSIS — E78.1 HYPERTRIGLYCERIDEMIA: ICD-10-CM

## 2023-12-08 DIAGNOSIS — Z23 NEED FOR IMMUNIZATION AGAINST INFLUENZA: ICD-10-CM

## 2023-12-08 DIAGNOSIS — E11.69 HYPERLIPIDEMIA ASSOCIATED WITH TYPE 2 DIABETES MELLITUS: ICD-10-CM

## 2023-12-08 DIAGNOSIS — K21.9 GASTROESOPHAGEAL REFLUX DISEASE, UNSPECIFIED WHETHER ESOPHAGITIS PRESENT: Chronic | ICD-10-CM

## 2023-12-08 DIAGNOSIS — I48.0 PAROXYSMAL ATRIAL FIBRILLATION (HCC): ICD-10-CM

## 2023-12-08 DIAGNOSIS — Z98.42 S/P BILATERAL CATARACT EXTRACTION: ICD-10-CM

## 2023-12-08 DIAGNOSIS — E11.9 TYPE 2 DIABETES MELLITUS WITHOUT COMPLICATION, WITHOUT LONG-TERM CURRENT USE OF INSULIN (HCC): Primary | ICD-10-CM

## 2023-12-08 DIAGNOSIS — Z98.41 S/P BILATERAL CATARACT EXTRACTION: ICD-10-CM

## 2023-12-08 PROCEDURE — 90686 IIV4 VACC NO PRSV 0.5 ML IM: CPT

## 2023-12-08 PROCEDURE — 90471 IMMUNIZATION ADMIN: CPT

## 2023-12-08 PROCEDURE — 99215 OFFICE O/P EST HI 40 MIN: CPT | Performed by: INTERNAL MEDICINE

## 2023-12-08 RX ORDER — LANCETS 30 GAUGE
EACH MISCELLANEOUS
Qty: 100 EACH | Refills: 3 | Status: SHIPPED | OUTPATIENT
Start: 2023-12-08

## 2023-12-08 RX ORDER — FENOFIBRATE 54 MG/1
54 TABLET ORAL DAILY
COMMUNITY

## 2023-12-08 NOTE — PROGRESS NOTES
Assessment/Plan:     Diagnosis ICD-10-CM Associated Orders   1. Type 2 diabetes mellitus without complication, without long-term current use of insulin (HCC)  E11.9       2. S/P bilateral cataract extraction  Z98.41     Z98.42       3. Gastroesophageal reflux disease, unspecified whether esophagitis present  K21.9       4. Hyperlipidemia associated with type 2 diabetes mellitus   E11.69     E78.5       5. Paroxysmal atrial fibrillation (HCC)  I48.0       6. Hypertriglyceridemia  E78.1           Problem List Items Addressed This Visit          Digestive    GERD (gastroesophageal reflux disease) (Chronic)     On pepcid and avoids carbonation or eat before bedtime             Endocrine    Type 2 diabetes mellitus without complication, without long-term current use of insulin (HCC) - Primary       Lab Results   Component Value Date    HGBA1C 6.7 (H) 10/17/2023   Dx  about 3 years ago and was on januvia-   Now on trulicity  - was able to stop other  diabetic meds   Will repeat in feb         Hyperlipidemia associated with type 2 diabetes mellitus        Lab Results   Component Value Date    HGBA1C 6.7 (H) 10/17/2023   Has high triglyrcidees - familial- on fenofibrate and atorvastatin          Relevant Medications    fenofibrate (TRICOR) 54 MG tablet       Cardiovascular and Mediastinum    Paroxysmal atrial fibrillation ([address])     Started age 23- was  on heroin at that times- has had recurrence since them- has quit etoh, smoking   Sees Dr. Radha Erwin   On metoprolol and flecanide    On aspirin 81 mg daily             Other    Hypertriglyceridemia    Relevant Medications    fenofibrate (TRICOR) 54 MG tablet    S/P bilateral cataract extraction     Hx juvenile cataracts- sees eye doctor regularly   Ahd laser surgery and prior retinal surgery              No follow-ups on file. Subjective:    Patient ID: Den Harley is a 44 y.o. female    Prior patient of Dr. Martha Mccray who has now retired  1.  Paf- has had multiple cardioversions - is on aspirin and metoprolol and tambocor   Wishes ot change to Nir Gaytan retiring  2. Diabetic- uses testing fasting in am- sees endocrinology- Dr. Maria G Chavira with [facility] building-- had am readings 90- 97- went up when starting  to feel hungry then level- active with working for cleaning   On trulicity- doing [employer]- helping  her cravings  3. Obesity level 1- BMI Counseling: Body mass index is 32.01 kg/m². The BMI is above normal. Nutrition recommendations include 3-5 servings of fruits/vegetables daily and moderation in carbohydrate intake. Exercise recommendations include exercising 3-5 times per week. 4. Breast surgery for scarred region this summer  5. Amenorrhea- last menses in June 1st- being work up for hormonal issues-   Seeing Dr. Johnson Stanley ( after Dr. Edilson Hogan retired)             The following portions of the patient's history were reviewed and updated as appropriate: allergies, current medications and problem list.     Review of Systems   HENT:  Negative for congestion. Cardiovascular:  Positive for palpitations. Negative for leg swelling. Endocrine:        Some hot flahses   All other systems reviewed and are negative.         Objective:      Current Outpatient Medications:     aspirin (ECOTRIN LOW STRENGTH) 81 mg EC tablet, Take 81 mg by mouth daily, Disp: , Rfl:     atorvastatin (LIPITOR) 20 mg tablet, TAKE 1 TABLET BY MOUTH EVERY DAY (Patient taking differently: daily at bedtime), Disp: 30 tablet, Rfl: 5    Blood Glucose Monitoring Suppl (ONE TOUCH ULTRA 2) w/Device KIT, USE TO TEST BLOOD SUGAR, Disp: 1 each, Rfl: 0    dulaglutide (Trulicity) 1.02 UW/8.2GT injection, INJECT 0.5 ML (0.75 MG TOTAL) UNDER THE SKIN EVERY 7 DAYS, Disp: 2 mL, Rfl: 1    famotidine (PEPCID) 20 mg tablet, Take 20 mg by mouth daily at bedtime as needed for heartburn, Disp: , Rfl:     fenofibrate (TRICOR) 54 MG tablet, Take 54 mg by mouth daily, Disp: , Rfl:     flecainide (TAMBOCOR) 100 mg tablet, 2 (two) times a day Per pt only takes at bedtime---, Disp: , Rfl:     glucose blood (OneTouch Ultra) test strip, Test once daily, Disp: 100 each, Rfl: 3    Lancets (OneTouch Delica Plus [id number]) MISC, TEST 1-2 TIMES A DAY, Disp: 100 each, Rfl: 3    metoprolol succinate (TOPROL-XL) 25 mg 24 hr tablet, Take 25 mg by mouth daily at bedtime, Disp: , Rfl:     Empagliflozin (JARDIANCE) 10 MG TABS tablet, Take 1 tablet (10 mg total) by mouth daily, Disp: 30 tablet, Rfl: 5    Blood pressure 110/60, pulse 72, height 5' 2" (1.575 m), weight 79.4 kg (175 lb), SpO2 94 %, not currently breastfeeding. Physical Exam  Vitals and nursing note reviewed. HENT:      Head: Normocephalic and atraumatic. Right Ear: There is no impacted cerumen. Left Ear: There is no impacted cerumen. Cardiovascular:      Rate and Rhythm: Normal rate and regular rhythm. Pulmonary:      Effort: No respiratory distress. Breath sounds: No rhonchi. Abdominal:      General: There is no distension. Palpations: Abdomen is soft. Musculoskeletal:         General: No tenderness. Neurological:      Mental Status: She is alert. Psychiatric:         Mood and Affect: Mood normal.         Thought Content:  Thought content normal.         Judgment: Judgment normal.

## 2023-12-08 NOTE — ASSESSMENT & PLAN NOTE
Hx juvenile cataracts- sees eye doctor regularly   Ahd laser surgery and prior retinal surgery
Lab Results   Component Value Date    HGBA1C 6.7 (H) 10/17/2023   Dx  about 3 years ago and was on januvia-   Now on trulicity  - was able to stop other  diabetic meds   Will repeat in feb
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
On pepcid and avoids carbonation or eat before bedtime
Started age 23- was  on heroin at that times- has had recurrence since them- has quit etoh, smoking   Sees Dr. Ralph Art   On metoprolol and flecanide    On aspirin 81 mg daily
never intubated

## 2023-12-21 ENCOUNTER — ANNUAL EXAM (OUTPATIENT)
Dept: GYNECOLOGY | Facility: CLINIC | Age: 43
End: 2023-12-21
Payer: COMMERCIAL

## 2023-12-21 VITALS
DIASTOLIC BLOOD PRESSURE: 72 MMHG | BODY MASS INDEX: 32.2 KG/M2 | SYSTOLIC BLOOD PRESSURE: 120 MMHG | WEIGHT: 175 LBS | HEIGHT: 62 IN

## 2023-12-21 DIAGNOSIS — E11.9 TYPE 2 DIABETES MELLITUS WITHOUT COMPLICATION, WITHOUT LONG-TERM CURRENT USE OF INSULIN (HCC): ICD-10-CM

## 2023-12-21 DIAGNOSIS — Z01.419 WOMEN'S ANNUAL ROUTINE GYNECOLOGICAL EXAMINATION: Primary | ICD-10-CM

## 2023-12-21 DIAGNOSIS — E11.69 HYPERLIPIDEMIA ASSOCIATED WITH TYPE 2 DIABETES MELLITUS: ICD-10-CM

## 2023-12-21 DIAGNOSIS — Z98.890 STATUS POST RIGHT BREAST LUMPECTOMY: ICD-10-CM

## 2023-12-21 DIAGNOSIS — Z98.42 S/P BILATERAL CATARACT EXTRACTION: ICD-10-CM

## 2023-12-21 DIAGNOSIS — N91.5 OLIGOMENORRHEA, UNSPECIFIED TYPE: ICD-10-CM

## 2023-12-21 DIAGNOSIS — E78.5 HYPERLIPIDEMIA ASSOCIATED WITH TYPE 2 DIABETES MELLITUS: ICD-10-CM

## 2023-12-21 DIAGNOSIS — Z98.41 S/P BILATERAL CATARACT EXTRACTION: ICD-10-CM

## 2023-12-21 DIAGNOSIS — I48.0 PAROXYSMAL ATRIAL FIBRILLATION (HCC): ICD-10-CM

## 2023-12-21 PROCEDURE — 99386 PREV VISIT NEW AGE 40-64: CPT | Performed by: OBSTETRICS & GYNECOLOGY

## 2023-12-21 NOTE — PROGRESS NOTES
"Assessment   Annual well woman exam  Status post right breast lumpectomy 2023  History of oligomenorrhea  Type 2 diabetes  History of atrial fibrillation  Status post bilateral cataract surgery        Plan   Screening mammography ordered   All questions answered.  Await pap smear results.  Breast self exam technique reviewed and patient encouraged to perform self-exam monthly.     Subjective here for annual exam     Mary Arevalo is a 43 y.o. female who presents for annual exam. Periods are irregular, lasting 5 days. Dysmenorrhea:none. Cyclic symptoms include none. No intermenstrual bleeding, spotting, or discharge.  She is infrequently sexually active, denies pelvic pain symptoms denies any vaginal discharge or irritation. the patient reports that there is not domestic violence in her life.     Current contraception: none  History of abnormal Pap smear: no  Family history of uterine or ovarian cancer: no  Regular self breast exam: yes  History of abnormal mammogram: yes -status postlumpectomy  Family history of breast cancer: no  History of abnormal lipids: yes -hyperlipidemia  Menstrual History:  OB History          0    Para   0    Term   0       0    AB   0    Living   0         SAB   0    IAB   0    Ectopic   0    Multiple   0    Live Births   0           Obstetric Comments   Age at menarche 13              Menarche age: 13  No LMP recorded.     Review of Systems  Pertinent items are noted in HPI.      Objective no acute distress     /72   Ht 5' 2\" (1.575 m)   Wt 79.4 kg (175 lb)   BMI 32.01 kg/m²     General:   alert and oriented, in no acute distress, alert, appears stated age, and cooperative   Heart: regular rate and rhythm, S1, S2 normal, no murmur, click, rub or gallop   Lungs: clear to auscultation bilaterally   Abdomen: soft, non-tender, without masses or organomegaly   Vulva: normal, Bartholin's, Urethra, [place]'s normal, female escutcheon   Vagina: normal mucosa, normal " discharge, no palpable nodules   Cervix: anteverted, no bleeding following Pap, no cervical motion tenderness, and no lesions nulliparous appearance   Uterus: normal size, anteverted, mobile, non-tender, normal shape and consistency   Adnexa: normal adnexa and no mass, fullness, tenderness   Bilateral breast exam in the sitting and supine position with chaperone present, no visible or palpable breast lesions identified.  No breast masses noted.    No supraclavicular or axillary lymphadenopathy noted.  No nipple discharge.   Reviewed self-breast exam techniques.   Rectal exam,  deferred

## 2024-01-23 DIAGNOSIS — E11.9 TYPE 2 DIABETES MELLITUS WITHOUT COMPLICATION, WITHOUT LONG-TERM CURRENT USE OF INSULIN (HCC): ICD-10-CM

## 2024-01-23 RX ORDER — DULAGLUTIDE 0.75 MG/.5ML
0.75 INJECTION, SOLUTION SUBCUTANEOUS
Qty: 0.5 ML | Refills: 1 | Status: SHIPPED | OUTPATIENT
Start: 2024-01-23

## 2024-01-23 RX ORDER — ATORVASTATIN CALCIUM 20 MG/1
TABLET, FILM COATED ORAL
Qty: 30 TABLET | Refills: 5 | Status: SHIPPED | OUTPATIENT
Start: 2024-01-23

## 2024-01-23 NOTE — TELEPHONE ENCOUNTER
Pt called and would like this medication sent to [facility] in [place] [phone].

## 2024-02-15 ENCOUNTER — CONSULT (OUTPATIENT)
Dept: GYNECOLOGY | Facility: CLINIC | Age: 44
End: 2024-02-15
Payer: COMMERCIAL

## 2024-02-15 VITALS
DIASTOLIC BLOOD PRESSURE: 72 MMHG | WEIGHT: 172.2 LBS | BODY MASS INDEX: 31.69 KG/M2 | SYSTOLIC BLOOD PRESSURE: 122 MMHG | HEIGHT: 62 IN

## 2024-02-15 DIAGNOSIS — N92.1 IRREGULAR INTERMENSTRUAL BLEEDING: Primary | ICD-10-CM

## 2024-02-15 DIAGNOSIS — R10.2 FEMALE PELVIC PAIN: ICD-10-CM

## 2024-02-15 DIAGNOSIS — N70.11 HYDROSALPINX: ICD-10-CM

## 2024-02-15 DIAGNOSIS — D25.0 FIBROIDS, SUBMUCOSAL: ICD-10-CM

## 2024-02-15 PROCEDURE — 99214 OFFICE O/P EST MOD 30 MIN: CPT | Performed by: OBSTETRICS & GYNECOLOGY

## 2024-02-15 PROCEDURE — 58100 BIOPSY OF UTERUS LINING: CPT | Performed by: OBSTETRICS & GYNECOLOGY

## 2024-02-15 NOTE — PROGRESS NOTES
Endometrial biopsy    Date/Time: 2/15/2024 11:00 AM    Performed by: C William Riedel, DO  Authorized by: C William Riedel, DO  Universal Protocol:  Consent: Verbal consent obtained.  Risks and benefits: risks, benefits and alternatives were discussed  Consent given by: patient  Patient identity confirmed: verbally with patient    Indication:     Indications: Other disorder of menstruation and other abnormal bleeding from female genital tract    Pre-procedure:     Premeds:  Ibuprofen  Procedure:     Procedure: endometrial biopsy with Pipelle      A bivalve speculum was placed in the vagina: yes      Cervix cleaned and prepped: yes      A paracervical block was performed: no      An intracervical block was performed: no      The cervix was dilated: no      Uterus sounded: yes      Uterus sound depth (cm):  7    Curettes used:  1    Specimen collected: specimen collected and sent to pathology      Patient tolerated procedure well with no complications: yes    Findings:     Uterus size:  9-10 weeks    Cervix: normal      Adnexa: normal    Comments:     Procedure comments:  EMB completed without difficulty.  Return to office in 2 weeks for test results and plan of care.

## 2024-02-15 NOTE — PROGRESS NOTES
"Assessment/Plan:    Diagnoses and all orders for this visit:    Fibroids, submucosal    Irregular intermenstrual bleeding    Hydrosalpinx    Female pelvic pain        Subjective: Here to review test results     Patient ID: Mary Arevalo is a 43 y.o. female.    HPI  43-year-old female, nulliparous recently here in December for annual exam.  Was not having any GYN concerns at that time.  She was seen by her endocrinologist shortly thereafter who ordered a pelvic ultrasound because of irregular bleeding symptoms.  Pelvic ultrasound revealed enlarged uterus with several moderate to small size fibroids.  Thickened endometrium measuring up to 13 mm.  Her largest fibroid measures 3.2 x 2.5 x 3.2 cm submucosal versus intracavitary fibroid.  Additional 1.5 x 1.7 x 1.5 cm subserosal fibroid noted.  There are 2 subserosal fibroids measuring 1.6 and 1.1 cm.  Adjacent to her right ovary or adnexa is a tubular structure measuring 2.5 x 1.3 cm likely hydrosalpinx.  Right ovary is otherwise normal.  Left ovary was not visualized with adjacent anechoic cyst noted also previously noted 5.4 cm cyst was noted on MRI.  Also reviewed these findings with the patient today.  Recommend endometrial biopsy to rule out endometrial hyperplasia or malignancy.  Patient agrees and would like to have EMB today.  Will premedicate with Motrin.    Review of Systems unchanged    Objective: No acute distress  /72 (BP Location: Right arm, Patient Position: Sitting, Cuff Size: Standard)   Ht 5' 2\" (1.575 m)   Wt 78.1 kg (172 lb 3.2 oz)   LMP 01/27/2024 (Exact Date)   BMI 31.50 kg/m²      Physical Exam  Vitals and nursing note reviewed. Exam conducted with a chaperone present.   Constitutional:       Appearance: Normal appearance. She is obese.   HENT:      Head: Normocephalic.   Eyes:      Extraocular Movements: Extraocular movements intact.      Pupils: Pupils are equal, round, and reactive to light.   Pulmonary:      Effort: Pulmonary effort " is normal.   Abdominal:      General: Abdomen is flat.      Palpations: Abdomen is soft.   Genitourinary:     General: Normal vulva.      Comments: Normal external genitalia  Normal vaginal discharge    EMB collected, dictated separately  Endometrial cavity sounded to 7 cm, tolerated well results pending  Musculoskeletal:         General: Normal range of motion.      Cervical back: Normal range of motion.   Skin:     General: Skin is warm and dry.   Neurological:      Mental Status: She is alert and oriented to person, place, and time.   Psychiatric:         Mood and Affect: Mood normal.         Behavior: Behavior normal.         Thought Content: Thought content normal.         Judgment: Judgment normal.        Please note    In addition to the time spent discussing the findings and results of today's visit and exam, I spent approximately 30 minutes of face-to-face time with the patient, greater than 50% of which was spent in counseling and coordination of care for this patient.

## 2024-02-20 ENCOUNTER — CONSULT (OUTPATIENT)
Dept: CARDIOLOGY CLINIC | Facility: CLINIC | Age: 44
End: 2024-02-20
Payer: COMMERCIAL

## 2024-02-20 VITALS
DIASTOLIC BLOOD PRESSURE: 76 MMHG | WEIGHT: 173.4 LBS | HEART RATE: 64 BPM | HEIGHT: 62 IN | BODY MASS INDEX: 31.91 KG/M2 | SYSTOLIC BLOOD PRESSURE: 132 MMHG

## 2024-02-20 DIAGNOSIS — I48.92 ATRIAL FLUTTER, UNSPECIFIED TYPE (HCC): ICD-10-CM

## 2024-02-20 PROCEDURE — 93000 ELECTROCARDIOGRAM COMPLETE: CPT | Performed by: INTERNAL MEDICINE

## 2024-02-20 PROCEDURE — 99244 OFF/OP CNSLTJ NEW/EST MOD 40: CPT | Performed by: INTERNAL MEDICINE

## 2024-02-20 RX ORDER — METOPROLOL SUCCINATE 25 MG/1
25 TABLET, EXTENDED RELEASE ORAL
Qty: 60 TABLET | Refills: 6 | Status: SHIPPED | OUTPATIENT
Start: 2024-02-20

## 2024-02-20 RX ORDER — FLECAINIDE ACETATE 100 MG/1
100 TABLET ORAL 2 TIMES DAILY
Qty: 60 TABLET | Refills: 6 | Status: SHIPPED | OUTPATIENT
Start: 2024-02-20

## 2024-02-20 NOTE — PROGRESS NOTES
[facility] Cardiology  Office Consultation  Mary Arevalo 43 y.o. female MRN: [id number]        Chief Complaint    Chief Complaint   Patient presents with    Consult     Referred PCP, no complaints          Referring Provider: Deidre Boss DO    Impression & Plan:    1. Atrial flutter, unspecified type (HCC)    In my search through scanned EKGs from [facility] (media folder) and EKGs/ rhythm strips from [facility] (including MPF viewer for pre-Epic records) I have only been able to confirm atrial flutter. Although the flutter is at times reported as atrial fibrillation. I think there is a high likelihood she has only experienced atrial flutter.    She is rhythm controlled on flecainide 100 mg BID but would like to come off AAD. I think this is reasonable given her age and high success rate of flutter ablation.     Will refer her to EP for atrial flutter ablation. Additionally, can consider loop recorder implantation for detection of concomitant atrial fibrillation (off flecainide). Patient is agreeable to loop recorder.     She is not presently on AC with [id number] of 2 for female, DM. She will likely require temporary course of anticoagulation after ablation, after which time it can be stopped unless atrial fibrillation is detected by loop recorder.       - Ambulatory Referral to Cardiology  - POCT ECG  - Ambulatory referral to Cardiac Electrophysiology; Future  - flecainide (TAMBOCOR) 100 mg tablet; Take 1 tablet (100 mg total) by mouth 2 (two) times a day Per pt only takes at bedtime---  Dispense: 60 tablet; Refill: 6  - metoprolol succinate (TOPROL-XL) 25 mg 24 hr tablet; Take 1 tablet (25 mg total) by mouth daily at bedtime  Dispense: 60 tablet; Refill: 6        We will see Mary Arevalo back in 6 months for routine follow-up.    HPI: Mary Arevalo is a 43 y.o. year old female with a reported history of paroxysmal atrial fibrillation requiring multiple cardioversions in the past, now on flecainide therapy, presenting  to establish care with cardiology.     She was previously followed by [facility], Dr. Parkinson, who recently retired. Therefore she has presented to establish with cardiology.     She had been seeing Dr. Parkinson for 17 years after a first episode of tachyarrhythmia that landed her in the hospital. She was last seen by Dr. Parkinson 4/17/23, encounter reviewed, and he noted a history of atrial fibrillation with recurrences requiring cardioversion and eventual flecainide therapy in Jan 2019. He reports a cardioversion in Jan 2019 for atrial fibrillation (however my review of these tracings suggests atrial flutter). She has not had symptomatic recurrences of tachyarrhythmia since being on flecainide.       No complaints.     Cardiac testing:     Echo (reported) 2/28/2019, [facility]: normal LV size and function, LVEF 60%, normal RV function, trace MR, mild aortic sclerosis    Echo (reported) 8/2/22, [facility], normal LV size and function, LVEF 55-60%, normal RV size and function, normal RVSP, no valve abnormalities    EKG reviewed personally:     EKG 6/12/2006 (MPF VIEWER) - reviewed tracing, which shows atrial flutter with  bpm    Rhythm strip from hospitalization June 2006 (with maiden)      EKG (reported) 1/8/19 @ [facility] - atrial flutter with 2:1 block, 117 bpm  EKG (reported) 1/9/19 @ [facility] - atrial flutter with 2:1 block, 112 bpm  EKG (reported) 1/9/19 @ [facility] - NSR, 1st deg AVB, 81 bpm    EKG 1/5/29, [facility], scanned 1/18/2019 -- reported as atrial fibrillation with RVR; tracings available for my review; in my interpretation this study shows atrial flutter with 2:1 conduction, 150 bpm    EKG 2/20/24 - sinus rhythm, 1st degree AVB, 64 bpm, lateral infarct pattern. Abnormal study.         Relevant Laboratory Studies:  (Laboratory studies personally reviewed)  Lipid panel 3/6/23 - , , HDL 34, LDL 46 mg/dL    Review of Systems   Constitutional:  Negative for activity change and fatigue.  "  HENT:  Negative for facial swelling.    Eyes:  Negative for visual disturbance.   Respiratory:  Negative for chest tightness and shortness of breath.    Cardiovascular:  Negative for chest pain, palpitations and leg swelling.   Gastrointestinal:  Negative for nausea and vomiting.   Musculoskeletal:  Negative for myalgias.   Skin:  Negative for pallor.   Allergic/Immunologic: Negative for immunocompromised state.   Neurological:  Negative for dizziness, syncope and light-headedness.   Psychiatric/Behavioral:  Negative for agitation and confusion. The patient is not nervous/anxious.          Past Medical History:   Diagnosis Date    Adnexal cyst     left \"fallopian tube\"    Anesthesia     per pt \"sometimes oxygen level drops very low\" not all the time -has occurred more than once under anesthesia\"    Atrial fibrillation (HCC)     [facility] Dr SUZANNA Parkinson--per pt \"did not have ablation and takes flecanide and metoprolol for it\"    Basal cell carcinoma 09/28/2022    left interauricular ear    Dental crowns present     Diabetes mellitus (HCC)     type 2    Elevated LFTs 06/01/2020    GERD (gastroesophageal reflux disease)     GERD (gastroesophageal reflux disease)     Irregular periods     Melanoma (HCC) 2022    Varicella      Past Surgical History:   Procedure Laterality Date    BREAST BIOPSY Right 02/02/2023    BREAST LUMPECTOMY Right 7/28/2023    Procedure: LUMPECTOMY BREAST KRISTIAN LOCALIZED;  Surgeon: Viola Gaines MD;  Location: [facility];  Service: Surgical Oncology    CARDIAC ELECTROPHYSIOLOGY STUDY AND ABLATION      per pt \"reports did not have this surgery\"    CATARACT EXTRACTION Bilateral     EYE SURGERY Left     ret[facility] surgery at [facility]    MOHS SURGERY Left 11/22/2022    [facility] left interauricular ear-Dr Workman--per pt \"a mole removed not in ear-on scalp\"    OVARIAN CYST SURGERY Right 02/02/2018    Procedure: LAPAROSCOPIC PARAOVARIAN  CYSTECTOMY;  Surgeon: Harley Darling MD;  Location:  MAIN OR;  Service: " Gynecology    SKIN BIOPSY      TONSILLECTOMY      and adenoids    US BREAST KRISTIAN  NEEDLE LOC RIGHT Right 2023    US GUIDED BREAST BIOPSY RIGHT COMPLETE Right 2023     Social History     Substance and Sexual Activity   Alcohol Use Yes    Comment: rare--3x/year     Social History     Substance and Sexual Activity   Drug Use No     Social History     Tobacco Use   Smoking Status Former    Current packs/day: 0.00    Average packs/day: 1.5 packs/day for 16.0 years (24.0 ttl pk-yrs)    Types: Cigarettes    Start date:     Quit date: 2012    Years since quittin.1   Smokeless Tobacco Never     Family History   Problem Relation Age of Onset    No Known Problems Mother     Heart disease Father     Colon cancer Maternal Uncle 62    Ovarian cancer Paternal Aunt 56    Hypertension Maternal Grandmother     Bone cancer Maternal Grandmother 93    No Known Problems Maternal Grandfather     No Known Problems Paternal Grandmother     No Known Problems Paternal Grandfather     Heart disease Family     Cancer Family     Thyroid disease Family     Substance Abuse Neg Hx     Mental illness Neg Hx        Allergies:  No Known Allergies    Medications (as of START of this encounter):   Outpatient Medications Prior to Visit   Medication Sig Dispense Refill    aspirin (ECOTRIN LOW STRENGTH) 81 mg EC tablet Take 81 mg by mouth daily      atorvastatin (LIPITOR) 20 mg tablet TAKE 1 TABLET BY MOUTH EVERY DAY 30 tablet 5    Blood Glucose Monitoring Suppl (ONE TOUCH ULTRA 2) w/Device KIT USE TO TEST BLOOD SUGAR 1 each 0    dulaglutide (Trulicity) 0.75 MG/0.5ML injection INJECT 0.5 ML (0.75 MG TOTAL) UNDER THE SKIN EVERY 7 DAYS 0.5 mL 1    Empagliflozin (JARDIANCE) 10 MG TABS tablet Take 1 tablet (10 mg total) by mouth daily 30 tablet 5    famotidine (PEPCID) 20 mg tablet Take 20 mg by mouth daily at bedtime as needed for heartburn      fenofibrate (TRICOR) 54 MG tablet Take 54 mg by mouth daily      flecainide (TAMBOCOR)  "100 mg tablet 2 (two) times a day Per pt only takes at bedtime---      glucose blood (OneTouch Ultra) test strip Test once daily 100 each 3    Lancets (OneTouch Delica Plus [id number]) MISC TEST 1-2 TIMES A  each 3    metoprolol succinate (TOPROL-XL) 25 mg 24 hr tablet Take 25 mg by mouth daily at bedtime       No facility-administered medications prior to visit.         Vitals:    02/20/24 1356   BP: 132/76   Pulse: 64     Weight (last 2 days)       Date/Time Weight    02/20/24 1356 78.7 (173.4)            General: Mary Arevalo is a well appearing female, in no acute distress, sitting comfortably  HEENT: moist mucous membranes, EOMI  Neck:  No JVD, supple, trachea midline   Cardiovascular: unremarkable S1/S2, regular rate and rhythm, no murmurs, rubs or gallops   Pulmonary: normal respiratory effort, CTAB   Abdomen: soft and nondistended  Extremities: No lower extremity edema. Warm and well perfused extremities.   Neuro: no focal motor deficits, AAOx3 (person, place, time)  Psych: Normal mood and affect, cooperative        Time Spent:  Total time (face-to-face and non-face-to-face) spent on today's visit was 55 minutes. This includes preparation for the visits (i.e. reviewing test results), performance of a medically appropriate history and examination, and orders for medications, tests or other procedures. This time is exclusive of procedures performed and time spent teaching.    Chaka Traylor MD    This note was completed in part utilizing [employer] One recognition software. Grammatical errors, random word insertion, spelling mistakes, occasional wrong word or \"sound-alike\" substitutions and incomplete sentences may be an occasional consequence of the system secondary to software limitations, ambient noise and hardware issues. At the time of dictation, efforts were made to edit, clarify and /or correct errors.  Please read the chart carefully and recognize, using context, where substitutions have " occurred.  If you have any questions or concerns about the context, text or information contained within the body of this dictation, please contact myself, the provider, for further clarification.

## 2024-02-21 LAB
PATH REPORT.COMMENTS IMP SPEC: NORMAL
PATH REPORT.SITE OF ORIGIN SPEC: NORMAL
PAYMENT PROCEDURE: NORMAL
SL AMB .: NORMAL

## 2024-02-29 ENCOUNTER — OFFICE VISIT (OUTPATIENT)
Dept: GYNECOLOGY | Facility: CLINIC | Age: 44
End: 2024-02-29
Payer: COMMERCIAL

## 2024-02-29 VITALS
WEIGHT: 174 LBS | HEIGHT: 62 IN | DIASTOLIC BLOOD PRESSURE: 74 MMHG | BODY MASS INDEX: 32.02 KG/M2 | SYSTOLIC BLOOD PRESSURE: 128 MMHG

## 2024-02-29 DIAGNOSIS — R93.89 THICKENED ENDOMETRIUM: ICD-10-CM

## 2024-02-29 DIAGNOSIS — I48.0 PAROXYSMAL ATRIAL FIBRILLATION (HCC): ICD-10-CM

## 2024-02-29 DIAGNOSIS — Z01.818 PREOP TESTING: ICD-10-CM

## 2024-02-29 DIAGNOSIS — D25.2 FIBROIDS, SUBSEROUS: ICD-10-CM

## 2024-02-29 DIAGNOSIS — E11.9 TYPE 2 DIABETES MELLITUS WITHOUT COMPLICATION, WITHOUT LONG-TERM CURRENT USE OF INSULIN (HCC): ICD-10-CM

## 2024-02-29 DIAGNOSIS — N92.6 IRREGULAR MENSTRUAL BLEEDING: ICD-10-CM

## 2024-02-29 DIAGNOSIS — N70.11 HYDROSALPINX: ICD-10-CM

## 2024-02-29 DIAGNOSIS — N85.02 ENDOMETRIAL HYPERPLASIA WITH ATYPIA: ICD-10-CM

## 2024-02-29 DIAGNOSIS — R10.2 FEMALE PELVIC PAIN: ICD-10-CM

## 2024-02-29 PROCEDURE — 99214 OFFICE O/P EST MOD 30 MIN: CPT | Performed by: OBSTETRICS & GYNECOLOGY

## 2024-02-29 NOTE — H&P (VIEW-ONLY)
Assessment/Plan:    Diagnoses and all orders for this visit:    Endometrial hyperplasia with atypia    Thickened endometrium  -     norethindrone (AYGESTIN) 5 mg tablet; Take 1 tablet (5 mg total) by mouth daily    Female pelvic pain    Irregular menstrual bleeding  -     norethindrone (AYGESTIN) 5 mg tablet; Take 1 tablet (5 mg total) by mouth daily    Fibroids, subserous    Hydrosalpinx    Type 2 diabetes mellitus without complication, without long-term current use of insulin (HCC)    Paroxysmal atrial fibrillation (HCC)    Preop testing  -     Hemoglobin A1C; Future  -     CBC; Future  -     Type and screen; Future  -     Hemoglobin A1C  -     CBC  -     Type and screen        Subjective: Here for test results and plan of care     Patient ID: Mary Arevalo is a 43 y.o. female.    HPI  43-year-old female, nulliparous recently here for annual exam with follow-up visit.  Patient was complaining of irregular menstruation.  She had gone a period of months without having any cycles at all and then her most recent 1 was somewhat heavier..  We did a pelvic ultrasound which revealed a slightly enlarged uterus with 2 small subserosal fibroids, thickened endometrium at 13 mm as well as possible hydrosalpinx in the right adnexa.  Pelvic ultrasound uterus measures 10.4 x 5.1 x 4.7 cm.  The endometrial cavity was slightly heterogenous and echogenic and measures 13 mm in maximum thickness.  Submucosal fibroid measures 3.2 x 2.5 x 3.2 cm, additional 1.5 x 1.7 x 1.5 cm subserosal fibroid also noted.  Normal-sized right ovary with adjacent tubular structure most compatible with dilated fallopian tube measuring 2.5 x 1.3 cm findings keeping with mild right-sided hydrosalpinx.  Left ovary was not clearly visualized there was a slightly lobulated anechoic cyst measuring 2.2 x 1.5 x 2.0 cm.  Possibly corresponding to previously noted 5.4 cm cyst seen on MRI.  Endometrial biopsy reveals complex endometrial hyperplasia with focal atypia  "and positive changes in a background of proliferative endometrium  After reviewing the test results I recommend we start her on a progestin to suppress increased thickening she will start norethindrone 5 mg once daily.  I also recommended she needs a D&C hysteroscopy to further evaluate and treat this hyperplasia and focal atypia.  Operative consent was obtained today.  Surgery tentatively scheduled for March 20, 2024.  All questions answered    Review of Systems unchanged    Objective: No acute distress  /74 (BP Location: Left arm, Patient Position: Sitting, Cuff Size: Standard)   Ht 5' 2\" (1.575 m)   Wt 78.9 kg (174 lb)   LMP 01/27/2024 (Exact Date)   BMI 31.83 kg/m²      Physical Exam  Vitals and nursing note reviewed.   Constitutional:       Appearance: Normal appearance. She is obese.   HENT:      Head: Normocephalic.      Mouth/Throat:      Mouth: Mucous membranes are moist.   Eyes:      Extraocular Movements: Extraocular movements intact.      Pupils: Pupils are equal, round, and reactive to light.   Cardiovascular:      Rate and Rhythm: Normal rate.   Pulmonary:      Effort: Pulmonary effort is normal.      Breath sounds: Normal breath sounds.   Abdominal:      General: Abdomen is flat.      Palpations: Abdomen is soft.   Genitourinary:     General: Normal vulva.      Comments: Normal external genitalia  Uterus slightly enlarged approximately 10-week plus size midline uterine tenderness noted  Normal cervix  No CMT  No pelvic masses  Normal vaginal discharge  Musculoskeletal:         General: Normal range of motion.      Cervical back: Normal range of motion.   Skin:     General: Skin is warm and dry.   Neurological:      Mental Status: She is alert and oriented to person, place, and time.   Psychiatric:         Mood and Affect: Mood normal.         Behavior: Behavior normal.         Thought Content: Thought content normal.         Judgment: Judgment normal.        Please note    In addition to the " time spent discussing the findings and results of today's visit and exam, I spent approximately 30 minutes of face-to-face time with the patient, greater than 50% of which was spent in counseling and coordination of care for this patient.

## 2024-03-06 ENCOUNTER — TELEPHONE (OUTPATIENT)
Dept: GYNECOLOGY | Facility: CLINIC | Age: 44
End: 2024-03-06

## 2024-03-06 NOTE — TELEPHONE ENCOUNTER
----- Message from C William Riedel, DO sent at 3/6/2024 10:59 AM EST -----  Regarding: Schedule surgery, D&C hysteroscopy surgery date 3/20/2024  [facility] GYN Department  Surgery Scheduling Sheet    Patient Name: Mary Arevalo  : 1980    Provider: C. William Riedel, DO     Needed: no; Language: N/A    Procedure: exam under anesthesia, dilation and curettage , and diagnostic hysteroscopy    Diagnosis: Endometrial hyperplasia, thickened endometrium    Special Needs or Equipment: none    Anesthesia: General endotracheal anesthesia (GET)    Length of stay: outpatient  Does patient have  comorbid conditions that will require close perioperative monitoring prior to safe discharge: no  The patient has comorbid conditions that will require close perioperative monitoring prior to safe discharge, including N/A. This may require acute care beyond the usual and routine recovery period. As such, inpatient admission post-operatively is expected and appropriate, and anticipated hospital length of stay will be >2 midnights.    Pre-Admission Testing Needed: no   Labs ordered: cbc, hgb A1C, and type and screen    PAT's recommended in prep for procedure ordered?: Yes    Medical Clearance Needed: no; Provider: N/A    MA Form Signed (tubals/hysterectomy): Not Indicated    Surgical Drink Given: no     How many days out of work: 2-3 day(s)     How many days no drivin-3 day(s)       Are other appointments needed?  no  Interval for post op appt: 2 week(s)     For Surgical Scheduler:    [place]: [facility]    Pre-op Appt:     Post op Appt:    Consult/Medical clearance appt:

## 2024-03-06 NOTE — TELEPHONE ENCOUNTER
Spoke with patient.. she will go to do her labs either this week or early next week.. teams number given in case there are any questions..

## 2024-03-08 ENCOUNTER — APPOINTMENT (OUTPATIENT)
Dept: LAB | Facility: HOSPITAL | Age: 44
End: 2024-03-08

## 2024-03-08 DIAGNOSIS — Z01.818 PRE-OP TESTING: ICD-10-CM

## 2024-03-08 DIAGNOSIS — Z01.818 OTHER SPECIFIED PRE-OPERATIVE EXAMINATION: ICD-10-CM

## 2024-03-08 LAB
ABO GROUP BLD: NORMAL
ANION GAP SERPL CALCULATED.3IONS-SCNC: 6 MMOL/L
BASOPHILS # BLD AUTO: 0.05 THOUSANDS/ÂΜL (ref 0–0.1)
BASOPHILS NFR BLD AUTO: 1 % (ref 0–1)
BLD GP AB SCN SERPL QL: NEGATIVE
BUN SERPL-MCNC: 16 MG/DL (ref 5–25)
CALCIUM SERPL-MCNC: 9.1 MG/DL (ref 8.4–10.2)
CHLORIDE SERPL-SCNC: 108 MMOL/L (ref 96–108)
CO2 SERPL-SCNC: 28 MMOL/L (ref 21–32)
CREAT SERPL-MCNC: 0.68 MG/DL (ref 0.6–1.3)
EOSINOPHIL # BLD AUTO: 0.15 THOUSAND/ÂΜL (ref 0–0.61)
EOSINOPHIL NFR BLD AUTO: 2 % (ref 0–6)
ERYTHROCYTE [DISTWIDTH] IN BLOOD BY AUTOMATED COUNT: 15.7 % (ref 11.6–15.1)
EST. AVERAGE GLUCOSE BLD GHB EST-MCNC: 134 MG/DL
GFR SERPL CREATININE-BSD FRML MDRD: 108 ML/MIN/1.73SQ M
GLUCOSE P FAST SERPL-MCNC: 154 MG/DL (ref 65–99)
HBA1C MFR BLD: 6.3 %
HCT VFR BLD AUTO: 52.8 % (ref 34.8–46.1)
HGB BLD-MCNC: 15.9 G/DL (ref 11.5–15.4)
IMM GRANULOCYTES # BLD AUTO: 0.04 THOUSAND/UL (ref 0–0.2)
IMM GRANULOCYTES NFR BLD AUTO: 1 % (ref 0–2)
LYMPHOCYTES # BLD AUTO: 1.98 THOUSANDS/ÂΜL (ref 0.6–4.47)
LYMPHOCYTES NFR BLD AUTO: 25 % (ref 14–44)
MCH RBC QN AUTO: 26.9 PG (ref 26.8–34.3)
MCHC RBC AUTO-ENTMCNC: 30.1 G/DL (ref 31.4–37.4)
MCV RBC AUTO: 90 FL (ref 82–98)
MONOCYTES # BLD AUTO: 0.42 THOUSAND/ÂΜL (ref 0.17–1.22)
MONOCYTES NFR BLD AUTO: 5 % (ref 4–12)
NEUTROPHILS # BLD AUTO: 5.27 THOUSANDS/ÂΜL (ref 1.85–7.62)
NEUTS SEG NFR BLD AUTO: 66 % (ref 43–75)
NRBC BLD AUTO-RTO: 0 /100 WBCS
PLATELET # BLD AUTO: 211 THOUSANDS/UL (ref 149–390)
PMV BLD AUTO: 9.7 FL (ref 8.9–12.7)
POTASSIUM SERPL-SCNC: 4.3 MMOL/L (ref 3.5–5.3)
RBC # BLD AUTO: 5.9 MILLION/UL (ref 3.81–5.12)
RH BLD: POSITIVE
SODIUM SERPL-SCNC: 142 MMOL/L (ref 135–147)
SPECIMEN EXPIRATION DATE: NORMAL
WBC # BLD AUTO: 7.91 THOUSAND/UL (ref 4.31–10.16)

## 2024-03-08 PROCEDURE — 86901 BLOOD TYPING SEROLOGIC RH(D): CPT

## 2024-03-08 PROCEDURE — 80048 BASIC METABOLIC PNL TOTAL CA: CPT

## 2024-03-08 PROCEDURE — 36415 COLL VENOUS BLD VENIPUNCTURE: CPT

## 2024-03-08 PROCEDURE — 85025 COMPLETE CBC W/AUTO DIFF WBC: CPT

## 2024-03-08 PROCEDURE — 86850 RBC ANTIBODY SCREEN: CPT

## 2024-03-08 PROCEDURE — 83036 HEMOGLOBIN GLYCOSYLATED A1C: CPT

## 2024-03-08 PROCEDURE — 86901 BLOOD TYPING SEROLOGIC RH(D): CPT | Performed by: OBSTETRICS & GYNECOLOGY

## 2024-03-08 PROCEDURE — 86850 RBC ANTIBODY SCREEN: CPT | Performed by: OBSTETRICS & GYNECOLOGY

## 2024-03-08 PROCEDURE — 86900 BLOOD TYPING SEROLOGIC ABO: CPT | Performed by: OBSTETRICS & GYNECOLOGY

## 2024-03-08 PROCEDURE — 86900 BLOOD TYPING SEROLOGIC ABO: CPT

## 2024-03-09 ENCOUNTER — LAB REQUISITION (OUTPATIENT)
Dept: LAB | Facility: HOSPITAL | Age: 44
End: 2024-03-09
Payer: COMMERCIAL

## 2024-03-09 DIAGNOSIS — Z01.818 ENCOUNTER FOR OTHER PREPROCEDURAL EXAMINATION: ICD-10-CM

## 2024-03-09 LAB
ABO GROUP BLD: NORMAL
BLD GP AB SCN SERPL QL: NEGATIVE
RH BLD: POSITIVE
SPECIMEN EXPIRATION DATE: NORMAL

## 2024-03-12 ENCOUNTER — HOSPITAL ENCOUNTER (OUTPATIENT)
Dept: MAMMOGRAPHY | Facility: CLINIC | Age: 44
Discharge: HOME | End: 2024-03-12
Payer: COMMERCIAL

## 2024-03-12 VITALS — HEIGHT: 62 IN | WEIGHT: 174 LBS | BODY MASS INDEX: 32.02 KG/M2

## 2024-03-12 DIAGNOSIS — Z09 FOLLOW-UP EXAM: ICD-10-CM

## 2024-03-12 PROCEDURE — G0279 TOMOSYNTHESIS, MAMMO: HCPCS

## 2024-03-12 PROCEDURE — 77066 DX MAMMO INCL CAD BI: CPT

## 2024-03-12 RX ORDER — CHLORHEXIDINE GLUCONATE ORAL RINSE 1.2 MG/ML
SOLUTION DENTAL 2 TIMES DAILY
COMMUNITY
Start: 2024-03-05

## 2024-03-12 NOTE — PRE-PROCEDURE INSTRUCTIONS
Pre-Surgery Instructions:   Medication Instructions    aspirin (ECOTRIN LOW STRENGTH) 81 mg EC tablet Verifying with cardiology regarding use ASA prior to surgery - pt will need call with instructions.      atorvastatin (LIPITOR) 20 mg tablet Take night before surgery    Blood Glucose Monitoring Suppl (ONE TOUCH ULTRA 2) w/Device KIT Continue as prescribed     chlorhexidine (PERIDEX) 0.12 % solution Take day of surgery.- swish and spit out     dulaglutide (Trulicity) 0.75 MG/0.5ML injection Stop taking 7 days prior to surgery. Including DOS - pt reports last dose 3/11/24     Empagliflozin (JARDIANCE) 10 MG TABS tablet Stop taking 4 days prior to surgery.including DOS     famotidine (PEPCID) 20 mg tablet Take night before surgery, if needed     fenofibrate (TRICOR) 54 MG tablet Take day of surgery.    flecainide (TAMBOCOR) 100 mg tablet Take night before surgery    glucose blood (OneTouch Ultra) test strip Continue use as prescribed     Lancets (OneTouch Delica Plus [id number]) MISC Continue use as prescribed     norethindrone (AYGESTIN) 5 mg tablet Instructed pt to call and verify DOS use with office    Pt has cleanser and instructions reviewed with pt for both night before and DOS- pt verbalized understanding of instructions reviewed.    Pt instructed no hair or body products on skin for DOS  Pt instructed no razor blade shaving within one week prior to surgery - ok to use electric razor up til 3/18- no shaving 3/19 or 3/20.  Pt instructed if with any health status changes between now and DOS - notify surgeon office.    Medication instructions for day surgery reviewed. Please use only a sip of water to take your instructed medications. Avoid all over the counter vitamins, supplements and NSAIDS for one week prior to surgery per anesthesia guidelines. Tylenol is ok to take as needed.     You will receive a call one business day prior to surgery with an arrival time and hospital directions. If your surgery is  scheduled on a Monday, the hospital will be calling you on the Friday prior to your surgery. If you have not heard from anyone by 8pm, please call the hospital supervisor through the hospital  at [phone]. ([place] [phone] or [place] [phone]).    Do not eat or drink anything after midnight the night before your surgery, including candy, mints, lifesavers, or chewing gum. Do not drink alcohol 24hrs before your surgery. Try not to smoke at least 24hrs before your surgery.       Follow the pre surgery showering instructions as listed in the “My Surgical Experience Booklet” or otherwise provided by your surgeon's office. Do not use a blade to shave the surgical area 1 week before surgery. It is okay to use a clean electric clippers up to 24 hours before surgery. Do not apply any lotions, creams, including makeup, cologne, deodorant, or perfumes after showering on the day of your surgery. Do not use dry shampoo, hair spray, hair gel, or any type of hair products.     No contact lenses, eye make-up, or artificial eyelashes. Remove nail polish, including gel polish, and any artificial, gel, or acrylic nails if possible. Remove all jewelry including rings and body piercing jewelry.     Wear causal clothing that is easy to take on and off. Consider your type of surgery.    Keep any valuables, jewelry, piercings at home. Please bring any specially ordered equipment (sling, braces) if indicated.    Arrange for a responsible person to drive you to and from the hospital on the day of your surgery. Please confirm the visitor policy for the day of your procedure when you receive your phone call with an arrival time.     Call the surgeon's office with any new illnesses, exposures, or additional questions prior to surgery.    Please reference your “My Surgical Experience Booklet” for additional information to prepare for your upcoming surgery.

## 2024-03-13 ENCOUNTER — TELEPHONE (OUTPATIENT)
Dept: GYNECOLOGY | Facility: CLINIC | Age: 44
End: 2024-03-13

## 2024-03-15 ENCOUNTER — TELEPHONE (OUTPATIENT)
Dept: CARDIOLOGY CLINIC | Facility: CLINIC | Age: 44
End: 2024-03-15

## 2024-03-15 ENCOUNTER — NURSE TRIAGE (OUTPATIENT)
Age: 44
End: 2024-03-15

## 2024-03-15 LAB
ALBUMIN SERPL-MCNC: 4.5 G/DL (ref 3.9–4.9)
ALBUMIN/GLOB SERPL: 2.1 {RATIO} (ref 1.2–2.2)
ALP SERPL-CCNC: 73 IU/L (ref 44–121)
ALT SERPL-CCNC: 25 IU/L (ref 0–32)
AST SERPL-CCNC: 22 IU/L (ref 0–40)
BASOPHILS # BLD AUTO: 0.1 X10E3/UL (ref 0–0.2)
BASOPHILS NFR BLD AUTO: 1 %
BILIRUB SERPL-MCNC: 1.2 MG/DL (ref 0–1.2)
BUN SERPL-MCNC: 15 MG/DL (ref 6–24)
BUN/CREAT SERPL: 21 (ref 9–23)
CALCIUM SERPL-MCNC: 9.4 MG/DL (ref 8.7–10.2)
CHLORIDE SERPL-SCNC: 106 MMOL/L (ref 96–106)
CHOLEST SERPL-MCNC: 130 MG/DL (ref 100–199)
CO2 SERPL-SCNC: 22 MMOL/L (ref 20–29)
CREAT SERPL-MCNC: 0.72 MG/DL (ref 0.57–1)
EGFR: 107 ML/MIN/1.73
EOSINOPHIL # BLD AUTO: 0.2 X10E3/UL (ref 0–0.4)
EOSINOPHIL NFR BLD AUTO: 2 %
ERYTHROCYTE [DISTWIDTH] IN BLOOD BY AUTOMATED COUNT: 14.8 % (ref 11.7–15.4)
EST. AVERAGE GLUCOSE BLD GHB EST-MCNC: 143 MG/DL
GLOBULIN SER-MCNC: 2.1 G/DL (ref 1.5–4.5)
GLUCOSE SERPL-MCNC: 136 MG/DL (ref 70–99)
HBA1C MFR BLD: 6.6 % (ref 4.8–5.6)
HCT VFR BLD AUTO: 50.9 % (ref 34–46.6)
HDLC SERPL-MCNC: 30 MG/DL
HGB BLD-MCNC: 15.6 G/DL (ref 11.1–15.9)
IMM GRANULOCYTES # BLD: 0 X10E3/UL (ref 0–0.1)
IMM GRANULOCYTES NFR BLD: 0 %
LDLC SERPL CALC-MCNC: 51 MG/DL (ref 0–99)
LDLC/HDLC SERPL: 1.7 RATIO (ref 0–3.2)
LYMPHOCYTES # BLD AUTO: 2.5 X10E3/UL (ref 0.7–3.1)
LYMPHOCYTES NFR BLD AUTO: 33 %
MCH RBC QN AUTO: 26.8 PG (ref 26.6–33)
MCHC RBC AUTO-ENTMCNC: 30.6 G/DL (ref 31.5–35.7)
MCV RBC AUTO: 88 FL (ref 79–97)
MONOCYTES # BLD AUTO: 0.3 X10E3/UL (ref 0.1–0.9)
MONOCYTES NFR BLD AUTO: 4 %
NEUTROPHILS # BLD AUTO: 4.6 X10E3/UL (ref 1.4–7)
NEUTROPHILS NFR BLD AUTO: 60 %
PLATELET # BLD AUTO: 248 X10E3/UL (ref 150–450)
POTASSIUM SERPL-SCNC: 4.3 MMOL/L (ref 3.5–5.2)
PROT SERPL-MCNC: 6.6 G/DL (ref 6–8.5)
RBC # BLD AUTO: 5.82 X10E6/UL (ref 3.77–5.28)
SL AMB VLDL CHOLESTEROL CALC: 49 MG/DL (ref 5–40)
SODIUM SERPL-SCNC: 145 MMOL/L (ref 134–144)
TRIGL SERPL-MCNC: 315 MG/DL (ref 0–149)
WBC # BLD AUTO: 7.7 X10E3/UL (ref 3.4–10.8)

## 2024-03-15 NOTE — TELEPHONE ENCOUNTER
FW: Pre op Med Hold  Received: 3 days ago  [facility]         Previous Messages       ----- Message -----  From: Bria Horton RN  Sent: 3/12/2024  11:29 AM EDT  To: [facility] [place]; *  Subject: Pre op Med Hold                                  Dilatation and curettage with hysteroscopy - exam under anesthesia - 3/20/24  Dr C William Riedel    Pt takes ASA 81 mg daily - need instructions for use prior to surgery 3/20/24.    Pt did see Dr Traylor on 2/20/24 - for cardiology visit.    Thank you for review!

## 2024-03-15 NOTE — TELEPHONE ENCOUNTER
"Reason for Disposition  • Nursing judgment     Call back on Monday due to office hours ended    Answer Assessment - Initial Assessment Questions  1. REASON FOR CALL or QUESTION: \"What is your reason for calling today?\" or \"How can I best help you?\" or \"What question do you have that I can help answer?\"    Mary had her pre-op bloodwork done yesterday. She received lab orders in the mail today. Unsure what all for as she is in the car and lab orders are home.  Asking if there is anything else she needs to do. Advised will forward to surgical coordinator to return call on Monday.    Protocols used: Information Only Call - No Triage-ADULT-[place]    "

## 2024-03-18 ENCOUNTER — ANESTHESIA EVENT (OUTPATIENT)
Dept: PERIOP | Facility: HOSPITAL | Age: 44
End: 2024-03-18
Payer: COMMERCIAL

## 2024-03-20 ENCOUNTER — ANESTHESIA (OUTPATIENT)
Dept: PERIOP | Facility: HOSPITAL | Age: 44
End: 2024-03-20
Payer: COMMERCIAL

## 2024-03-20 ENCOUNTER — HOSPITAL ENCOUNTER (OUTPATIENT)
Facility: HOSPITAL | Age: 44
Setting detail: OUTPATIENT SURGERY
Discharge: HOME | End: 2024-03-20
Attending: OBSTETRICS & GYNECOLOGY | Admitting: OBSTETRICS & GYNECOLOGY
Payer: COMMERCIAL

## 2024-03-20 VITALS
HEART RATE: 52 BPM | WEIGHT: 172.4 LBS | TEMPERATURE: 97 F | DIASTOLIC BLOOD PRESSURE: 80 MMHG | HEIGHT: 62 IN | BODY MASS INDEX: 31.73 KG/M2 | OXYGEN SATURATION: 96 % | SYSTOLIC BLOOD PRESSURE: 113 MMHG | RESPIRATION RATE: 16 BRPM

## 2024-03-20 DIAGNOSIS — N85.00 ENDOMETRIAL HYPERPLASIA: ICD-10-CM

## 2024-03-20 DIAGNOSIS — R93.89 THICKENED ENDOMETRIUM: ICD-10-CM

## 2024-03-20 DIAGNOSIS — Z98.890 STATUS POST HYSTEROSCOPY: Primary | ICD-10-CM

## 2024-03-20 DIAGNOSIS — E11.9 TYPE 2 DIABETES MELLITUS WITHOUT COMPLICATION, WITHOUT LONG-TERM CURRENT USE OF INSULIN (HCC): ICD-10-CM

## 2024-03-20 LAB
ABO GROUP BLD: NORMAL
BLD GP AB SCN SERPL QL: NEGATIVE
EXT PREGNANCY TEST URINE: NEGATIVE
EXT. CONTROL: NORMAL
GLUCOSE SERPL-MCNC: 129 MG/DL (ref 65–140)
GLUCOSE SERPL-MCNC: 159 MG/DL (ref 65–140)
RH BLD: POSITIVE
SPECIMEN EXPIRATION DATE: NORMAL

## 2024-03-20 PROCEDURE — 86900 BLOOD TYPING SEROLOGIC ABO: CPT | Performed by: OBSTETRICS & GYNECOLOGY

## 2024-03-20 PROCEDURE — 86850 RBC ANTIBODY SCREEN: CPT | Performed by: OBSTETRICS & GYNECOLOGY

## 2024-03-20 PROCEDURE — 81025 URINE PREGNANCY TEST: CPT | Performed by: OBSTETRICS & GYNECOLOGY

## 2024-03-20 PROCEDURE — 82948 REAGENT STRIP/BLOOD GLUCOSE: CPT

## 2024-03-20 PROCEDURE — 86901 BLOOD TYPING SEROLOGIC RH(D): CPT | Performed by: OBSTETRICS & GYNECOLOGY

## 2024-03-20 PROCEDURE — 58558 HYSTEROSCOPY BIOPSY: CPT | Performed by: OBSTETRICS & GYNECOLOGY

## 2024-03-20 PROCEDURE — 88305 TISSUE EXAM BY PATHOLOGIST: CPT | Performed by: STUDENT IN AN ORGANIZED HEALTH CARE EDUCATION/TRAINING PROGRAM

## 2024-03-20 RX ORDER — ACETAMINOPHEN 500 MG
1000 TABLET ORAL EVERY 6 HOURS PRN
Qty: 30 TABLET | Refills: 0 | Status: SHIPPED | OUTPATIENT
Start: 2024-03-20 | End: 2024-03-21 | Stop reason: ALTCHOICE

## 2024-03-20 RX ORDER — ONDANSETRON 2 MG/ML
INJECTION INTRAMUSCULAR; INTRAVENOUS AS NEEDED
Status: DISCONTINUED | OUTPATIENT
Start: 2024-03-20 | End: 2024-03-20

## 2024-03-20 RX ORDER — MIDAZOLAM HYDROCHLORIDE 2 MG/2ML
INJECTION, SOLUTION INTRAMUSCULAR; INTRAVENOUS AS NEEDED
Status: DISCONTINUED | OUTPATIENT
Start: 2024-03-20 | End: 2024-03-20

## 2024-03-20 RX ORDER — ACETAMINOPHEN 325 MG/1
975 TABLET ORAL EVERY 6 HOURS PRN
Status: DISCONTINUED | OUTPATIENT
Start: 2024-03-20 | End: 2024-03-20 | Stop reason: HOSPADM

## 2024-03-20 RX ORDER — PROPOFOL 10 MG/ML
INJECTION, EMULSION INTRAVENOUS AS NEEDED
Status: DISCONTINUED | OUTPATIENT
Start: 2024-03-20 | End: 2024-03-20

## 2024-03-20 RX ORDER — ONDANSETRON 2 MG/ML
4 INJECTION INTRAMUSCULAR; INTRAVENOUS ONCE AS NEEDED
Status: DISCONTINUED | OUTPATIENT
Start: 2024-03-20 | End: 2024-03-20 | Stop reason: HOSPADM

## 2024-03-20 RX ORDER — KETOROLAC TROMETHAMINE 30 MG/ML
INJECTION, SOLUTION INTRAMUSCULAR; INTRAVENOUS AS NEEDED
Status: DISCONTINUED | OUTPATIENT
Start: 2024-03-20 | End: 2024-03-20

## 2024-03-20 RX ORDER — SODIUM CHLORIDE 9 MG/ML
125 INJECTION, SOLUTION INTRAVENOUS CONTINUOUS
Status: DISCONTINUED | OUTPATIENT
Start: 2024-03-20 | End: 2024-03-20 | Stop reason: HOSPADM

## 2024-03-20 RX ORDER — IBUPROFEN 600 MG/1
600 TABLET ORAL EVERY 6 HOURS PRN
Status: DISCONTINUED | OUTPATIENT
Start: 2024-03-20 | End: 2024-03-20 | Stop reason: HOSPADM

## 2024-03-20 RX ORDER — FENTANYL CITRATE/PF 50 MCG/ML
25 SYRINGE (ML) INJECTION
Status: DISCONTINUED | OUTPATIENT
Start: 2024-03-20 | End: 2024-03-20 | Stop reason: HOSPADM

## 2024-03-20 RX ORDER — LIDOCAINE HYDROCHLORIDE 20 MG/ML
INJECTION, SOLUTION EPIDURAL; INFILTRATION; INTRACAUDAL; PERINEURAL AS NEEDED
Status: DISCONTINUED | OUTPATIENT
Start: 2024-03-20 | End: 2024-03-20

## 2024-03-20 RX ORDER — ONDANSETRON 2 MG/ML
4 INJECTION INTRAMUSCULAR; INTRAVENOUS EVERY 6 HOURS PRN
Status: DISCONTINUED | OUTPATIENT
Start: 2024-03-20 | End: 2024-03-20 | Stop reason: HOSPADM

## 2024-03-20 RX ORDER — IBUPROFEN 600 MG/1
600 TABLET ORAL EVERY 6 HOURS PRN
Qty: 30 TABLET | Refills: 0 | Status: SHIPPED | OUTPATIENT
Start: 2024-03-20

## 2024-03-20 RX ORDER — MAGNESIUM HYDROXIDE 1200 MG/15ML
LIQUID ORAL AS NEEDED
Status: DISCONTINUED | OUTPATIENT
Start: 2024-03-20 | End: 2024-03-20 | Stop reason: HOSPADM

## 2024-03-20 RX ORDER — FENTANYL CITRATE 50 UG/ML
INJECTION, SOLUTION INTRAMUSCULAR; INTRAVENOUS AS NEEDED
Status: DISCONTINUED | OUTPATIENT
Start: 2024-03-20 | End: 2024-03-20

## 2024-03-20 RX ADMIN — PROPOFOL 150 MG: 10 INJECTION, EMULSION INTRAVENOUS at 12:47

## 2024-03-20 RX ADMIN — KETOROLAC TROMETHAMINE 30 MG: 30 INJECTION, SOLUTION INTRAMUSCULAR; INTRAVENOUS at 13:11

## 2024-03-20 RX ADMIN — FENTANYL CITRATE 25 MCG: 50 INJECTION INTRAMUSCULAR; INTRAVENOUS at 13:06

## 2024-03-20 RX ADMIN — SODIUM CHLORIDE 125 ML/HR: 0.9 INJECTION, SOLUTION INTRAVENOUS at 11:26

## 2024-03-20 RX ADMIN — IBUPROFEN 600 MG: 600 TABLET, FILM COATED ORAL at 14:22

## 2024-03-20 RX ADMIN — ONDANSETRON 4 MG: 2 INJECTION INTRAMUSCULAR; INTRAVENOUS at 12:47

## 2024-03-20 RX ADMIN — MIDAZOLAM 2 MG: 1 INJECTION INTRAMUSCULAR; INTRAVENOUS at 12:42

## 2024-03-20 RX ADMIN — FENTANYL CITRATE 25 MCG: 50 INJECTION INTRAMUSCULAR; INTRAVENOUS at 12:52

## 2024-03-20 RX ADMIN — LIDOCAINE HYDROCHLORIDE 100 MG: 20 INJECTION, SOLUTION EPIDURAL; INFILTRATION; INTRACAUDAL at 12:47

## 2024-03-20 RX ADMIN — SODIUM CHLORIDE 125 ML/HR: 0.9 INJECTION, SOLUTION INTRAVENOUS at 14:24

## 2024-03-20 NOTE — ANESTHESIA PREPROCEDURE EVALUATION
Procedure:  DILATATION & CURETTAGE W/ HYSTEROSCOPY , EXAM UNDER ANESTHESIA (Pelvis)    Relevant Problems   CARDIO   (+) Hyperlipidemia associated with type 2 diabetes mellitus    (+) Hypertriglyceridemia      ENDO   (+) Type 2 diabetes mellitus without complication, without long-term current use of insulin (HCC)      GI/HEPATIC   (+) GERD (gastroesophageal reflux disease)        Physical Exam    Airway    Mallampati score: I  TM Distance: >3 FB  Neck ROM: full     Dental   No notable dental hx     Cardiovascular  Cardiovascular exam normal    Pulmonary  Pulmonary exam normal     Other Findings  post-pubertal.      Anesthesia Plan  ASA Score- 2     Anesthesia Type- general with ASA Monitors.         Additional Monitors:     Airway Plan: LMA.           Plan Factors-Exercise tolerance (METS): >4 METS.    Chart reviewed.   Existing labs reviewed. Patient summary reviewed.    Patient is not a current smoker.      Obstructive sleep apnea risk education given perioperatively.        Induction- intravenous.    Postoperative Plan-     Informed Consent- Anesthetic plan and risks discussed with patient.

## 2024-03-20 NOTE — OP NOTE
OPERATIVE REPORT  PATIENT NAME: Mary Arevalo    :  1980  MRN: [id number]  Pt Location: [facility] OR ROOM 06    SURGERY DATE: 3/20/2024    Surgeons and Role:     * C William Riedel, DO - Primary     * Kateryna Huynh MD - Assisting    Preop Diagnosis:  Endometrial hyperplasia [N85.00]  Thickened endometrium [R93.89]    Post-Op Diagnosis Codes:     * Endometrial hyperplasia [N85.00]     * Thickened endometrium [R93.89]    Procedure(s):  DILATATION & CURETTAGE W/ HYSTEROSCOPY . EXAM UNDER ANESTHESIA    Specimen(s):  ID Type Source Tests Collected by Time Destination   1 : EMC and Polyp Tissue Endometrium TISSUE EXAM C William Riedel, DO 3/20/2024 1303    2 : ECC Tissue Cervix, Endocervical TISSUE EXAM C William Riedel, DO 3/20/2024 1303        Estimated Blood Loss:   Minimal    Drains:  NG/OG/Enteral Tube Orogastric 18 Fr Center mouth (Active)   Number of days: 2238       Anesthesia Type:   General LMA    Operative Indications:  Endometrial hyperplasia [N85.00]  Thickened endometrium [R93.89]      Operative Findings:  1.  External genitalia grossly normal in appearance.  No ulcerations, no lacerations, no lesions.  Bimanual exam revealed anteverted uterus approximately 10 weeks in size  with normal contours and freely mobile.  No adnexal masses palpated bilaterally.  2.  Vagina and cervix were grossly normal in appearance without any lacerations or lesions.   3. Uterus sounded to 8 cm.  4. Hysteroscopic examination revealed proliferative endometrial lining. Polyp was noted at the 6 o'clock position. Polyp was noted at the endocervix at the 1 o'clock position. Bilateral ostia were visualized.      Complications:   None apparent    Procedure and Technique:  The patient was taken to the operating room where a time out was performed to confirm correct patient and correct procedure. General LMA anesthesia (LMA) was administered and the patient was positioned on the OR table in the dorsal lithotomy position. All pressure  points were padded and a aleshia hugger was placed to maintain control of core body temperature. The patient was prepped and draped in the usual sterile fashion.    A straight catheter was introduced into the bladder, which was drained of 50cc of clear yellow urine. A weighted speculum was inserted into the vagina and a George retractor was used to visualize the anterior lip of the cervix, which was then grasped with a single toothed tenaculum. The uterus was sounded to 8cm. The cervix was serially dilated to 18 Bengali using Mikey dilators for introduction of the hysteroscope.     Hysteroscope was introduced under direct visualization using normal saline solution as the distention media. Hysteroscope was advanced to the uterine fundus and the entire uterine cavity was inspected in a systematic manner with findings as described above. Hysteroscope was withdrawn and sharp curetting was performed, starting at the 12'oclock position and rotating a total of 360 degrees to cover all surfaces. Endometrial tissue was obtained and sent for pathology. The hysteroscope was then re-introduced under direct visualization, again using normal saline solution as the distention media. Entire uterine cavity was inspected in a systematic manner. Hysteroscope was withdrawn and sharp curetting was again performed, starting at the 12'oclock position and rotating a total of 360 degrees to cover all surfaces. The hysteroscope was then re-introduced under direct visualization, again using normal saline solution as the distention media. Entire uterine cavity was inspected in a systematic manner. Adequate curetting was confirmed and hysteroscopy was withdrawn.     The single toothed tenaculum was removed from the anterior lip of the cervix. Good hemostasis was confirmed at the tenaculum puncture sites. Weighted speculum was then removed from the vagina. At the conclusion of the procedure, all needle, sponge, and instrument counts were noted to be  correct x2. A fluid deficit of 150cc was noted.     Dr. Riedel was present and participated in all key portions of the case.     Patient Disposition:  PACU         SIGNATURE: Kateryna Huynh MD  DATE: March 20, 2024  TIME: 1:20 PM

## 2024-03-20 NOTE — INTERVAL H&P NOTE
H&P reviewed. After examining the patient I find no changes in the patients condition since the H&P had been written.    Vitals:    03/20/24 1110   BP: 124/66   Pulse: 60   Resp: 16   Temp: 97.5 °F (36.4 °C)   SpO2: 94%

## 2024-03-20 NOTE — ANESTHESIA POSTPROCEDURE EVALUATION
Post-Op Assessment Note    CV Status:  Stable  Pain Score: 2    Pain management: adequate       Mental Status:  Alert and awake   Hydration Status:  Euvolemic and stable   PONV Controlled:  Controlled   Airway Patency:  Patent     Post Op Vitals Reviewed: Yes    No anethesia notable event occurred.    Staff: Anesthesiologist               /80 (03/20/24 1450)    Temp (!) 97 °F (36.1 °C) (03/20/24 1450)    Pulse (!) 52 (03/20/24 1450)   Resp 16 (03/20/24 1450)    SpO2 96 % (03/20/24 1450)

## 2024-03-21 ENCOUNTER — OFFICE VISIT (OUTPATIENT)
Dept: FAMILY MEDICINE CLINIC | Facility: HOSPITAL | Age: 44
End: 2024-03-21
Payer: COMMERCIAL

## 2024-03-21 VITALS
DIASTOLIC BLOOD PRESSURE: 78 MMHG | OXYGEN SATURATION: 95 % | HEIGHT: 62 IN | HEART RATE: 58 BPM | BODY MASS INDEX: 32.2 KG/M2 | WEIGHT: 175 LBS | SYSTOLIC BLOOD PRESSURE: 108 MMHG

## 2024-03-21 DIAGNOSIS — N92.6 IRREGULAR MENSTRUAL BLEEDING: ICD-10-CM

## 2024-03-21 DIAGNOSIS — E11.69 HYPERLIPIDEMIA ASSOCIATED WITH TYPE 2 DIABETES MELLITUS  (HCC): ICD-10-CM

## 2024-03-21 DIAGNOSIS — I48.0 PAROXYSMAL ATRIAL FIBRILLATION (HCC): ICD-10-CM

## 2024-03-21 DIAGNOSIS — K21.9 GASTROESOPHAGEAL REFLUX DISEASE, UNSPECIFIED WHETHER ESOPHAGITIS PRESENT: Chronic | ICD-10-CM

## 2024-03-21 DIAGNOSIS — E78.5 HYPERLIPIDEMIA ASSOCIATED WITH TYPE 2 DIABETES MELLITUS  (HCC): ICD-10-CM

## 2024-03-21 DIAGNOSIS — E11.9 TYPE 2 DIABETES MELLITUS WITHOUT COMPLICATION, WITHOUT LONG-TERM CURRENT USE OF INSULIN (HCC): Primary | ICD-10-CM

## 2024-03-21 DIAGNOSIS — R93.89 THICKENED ENDOMETRIUM: ICD-10-CM

## 2024-03-21 DIAGNOSIS — E78.1 HYPERTRIGLYCERIDEMIA: ICD-10-CM

## 2024-03-21 PROCEDURE — 99214 OFFICE O/P EST MOD 30 MIN: CPT | Performed by: INTERNAL MEDICINE

## 2024-03-21 NOTE — ASSESSMENT & PLAN NOTE
Now  seeing Dr. Alfaro- felt to be on atrial flutter  on metoprolol   To see Dr. Pee charlton- may need to consider for ablation- she reports she never had ablation   Had cardioversions every 3 years since about age 26 would require intervention

## 2024-03-21 NOTE — ASSESSMENT & PLAN NOTE
Usually checking  140- 160  in am- is off her diabetic meds as per surgery team  Restart trulicity next monday  Lab Results   Component Value Date    HGBA1C 6.6 (H) 03/14/2024

## 2024-03-21 NOTE — PROGRESS NOTES
Assessment/Plan:     Diagnosis ICD-10-CM Associated Orders   1. Type 2 diabetes mellitus without complication, without long-term current use of insulin (HCC)  E11.9       2. Thickened endometrium  R93.89       3. Irregular menstrual bleeding  N92.6       4. Paroxysmal atrial fibrillation (HCC)  I48.0       5. Hyperlipidemia associated with type 2 diabetes mellitus   E11.69     E78.5       6. Gastroesophageal reflux disease, unspecified whether esophagitis present  K21.9       7. Hypertriglyceridemia  E78.1           Problem List Items Addressed This Visit        Cardiovascular and Mediastinum    Paroxysmal atrial fibrillation (HCC)      Now  seeing Dr. Alfaro- felt to be on atrial flutter  on metoprolol   To see Dr. Pee charlton- may need to consider for ablation- she reports she never had ablation   Had cardioversions every 3 years since about age 26 would require intervention            Digestive    GERD (gastroesophageal reflux disease) (Chronic)     Uses pepcid at night            Endocrine    Type 2 diabetes mellitus without complication, without long-term current use of insulin (HCC) - Primary     Usually checking  140- 160  in am- is off her diabetic meds as per surgery team  Restart trulicity next monday  Lab Results   Component Value Date    HGBA1C 6.6 (H) 03/14/2024          Hyperlipidemia associated with type 2 diabetes mellitus        Lab Results   Component Value Date    HGBA1C 6.6 (H) 03/14/2024             Obstetrics/Gynecology    Irregular menstrual bleeding    Thickened endometrium     Had D&C yesterday wit Dr. Sears- has appt  in early April with him            Other    Hypertriglyceridemia     Still elevated but now improving  Was over 600- now 300              No follow-ups on file.      Subjective:    Patient ID: Mary Arevalo is a 43 y.o. female    Here with mother driving-  patient  had d and c yesterday for thickened endometrium  Atrial flutter - to see ep team   Dmtype 2 - will have her  restart jardiance tonight        The following portions of the patient's history were reviewed and updated as appropriate: allergies, current medications and problem list.     Review of Systems   Respiratory:  Negative for shortness of breath.    Gastrointestinal:         Some cramping   All other systems reviewed and are negative.        Objective:      Current Outpatient Medications:   •  atorvastatin (LIPITOR) 20 mg tablet, TAKE 1 TABLET BY MOUTH EVERY DAY (Patient taking differently: Take 20 mg by mouth daily at bedtime), Disp: 30 tablet, Rfl: 5  •  Blood Glucose Monitoring Suppl (ONE TOUCH ULTRA 2) w/Device KIT, USE TO TEST BLOOD SUGAR (Patient taking differently: in the morning), Disp: 1 each, Rfl: 0  •  chlorhexidine (PERIDEX) 0.12 % solution, Apply to the mouth or throat 2 (two) times a day, Disp: , Rfl:   •  famotidine (PEPCID) 20 mg tablet, Take 20 mg by mouth daily at bedtime as needed for heartburn, Disp: , Rfl:   •  fenofibrate (TRICOR) 54 MG tablet, Take 54 mg by mouth daily, Disp: , Rfl:   •  flecainide (TAMBOCOR) 100 mg tablet, Take 1 tablet (100 mg total) by mouth 2 (two) times a day Per pt only takes at bedtime--- (Patient taking differently: Take 100 mg by mouth daily at bedtime Per pt only takes at bedtime---), Disp: 60 tablet, Rfl: 6  •  glucose blood (OneTouch Ultra) test strip, Test once daily, Disp: 100 each, Rfl: 3  •  ibuprofen (MOTRIN) 600 mg tablet, Take 1 tablet (600 mg total) by mouth every 6 (six) hours as needed for mild pain, Disp: 30 tablet, Rfl: 0  •  Lancets (OneTouch Delica Plus [id number]) MISC, TEST 1-2 TIMES A DAY, Disp: 100 each, Rfl: 3  •  metoprolol succinate (TOPROL-XL) 25 mg 24 hr tablet, Take 1 tablet (25 mg total) by mouth daily at bedtime, Disp: 60 tablet, Rfl: 6  •  aspirin (ECOTRIN LOW STRENGTH) 81 mg EC tablet, Take 81 mg by mouth daily Hx A flutter -  will verify with cardiology (Patient not taking: Reported on 3/21/2024), Disp: , Rfl:   •  dulaglutide (Trulicity)  "0.75 MG/0.5ML injection, INJECT 0.5 ML (0.75 MG TOTAL) UNDER THE SKIN EVERY 7 DAYS (Patient not taking: Reported on 3/21/2024), Disp: 0.5 mL, Rfl: 1  •  Empagliflozin (JARDIANCE) 10 MG TABS tablet, Take 1 tablet (10 mg total) by mouth daily (Patient not taking: Reported on 3/21/2024), Disp: 30 tablet, Rfl: 5  •  norethindrone (AYGESTIN) 5 mg tablet, Take 1 tablet (5 mg total) by mouth daily (Patient not taking: Reported on 3/21/2024), Disp: 30 tablet, Rfl: 6  No current facility-administered medications for this visit.    Blood pressure 108/78, pulse 58, height 5' 2\" (1.575 m), weight 79.4 kg (175 lb), last menstrual period 03/13/2024, SpO2 95%, not currently breastfeeding.     Physical Exam  Vitals and nursing note reviewed.   HENT:      Right Ear: Tympanic membrane normal. There is no impacted cerumen.      Left Ear: Tympanic membrane normal. There is no impacted cerumen.      Mouth/Throat:      Pharynx: No oropharyngeal exudate.   Eyes:      General:         Right eye: No discharge.         Left eye: No discharge.   Cardiovascular:      Rate and Rhythm: Normal rate and regular rhythm.      Heart sounds: No murmur heard.  Pulmonary:      Breath sounds: No wheezing or rhonchi.   Abdominal:      Palpations: Abdomen is soft.      Tenderness: There is no abdominal tenderness.   Musculoskeletal:         General: No swelling or tenderness.      Cervical back: No tenderness.   Lymphadenopathy:      Cervical: No cervical adenopathy.   Skin:     Findings: No erythema.   Neurological:      Mental Status: She is alert.      Cranial Nerves: No cranial nerve deficit.   Psychiatric:         Mood and Affect: Mood normal.         Thought Content: Thought content normal.        "

## 2024-03-22 ENCOUNTER — TELEPHONE (OUTPATIENT)
Dept: CARDIOLOGY CLINIC | Facility: CLINIC | Age: 44
End: 2024-03-22

## 2024-03-22 ENCOUNTER — OFFICE VISIT (OUTPATIENT)
Dept: CARDIOLOGY CLINIC | Facility: CLINIC | Age: 44
End: 2024-03-22
Payer: COMMERCIAL

## 2024-03-22 ENCOUNTER — PREP FOR PROCEDURE (OUTPATIENT)
Dept: CARDIOLOGY CLINIC | Facility: CLINIC | Age: 44
End: 2024-03-22

## 2024-03-22 VITALS
WEIGHT: 175.2 LBS | HEART RATE: 57 BPM | BODY MASS INDEX: 32.24 KG/M2 | HEIGHT: 62 IN | SYSTOLIC BLOOD PRESSURE: 120 MMHG | DIASTOLIC BLOOD PRESSURE: 60 MMHG

## 2024-03-22 DIAGNOSIS — E78.1 HYPERTRIGLYCERIDEMIA: ICD-10-CM

## 2024-03-22 DIAGNOSIS — I48.92 ATRIAL FLUTTER, UNSPECIFIED TYPE (HCC): Primary | ICD-10-CM

## 2024-03-22 DIAGNOSIS — I48.0 PAROXYSMAL ATRIAL FIBRILLATION (HCC): ICD-10-CM

## 2024-03-22 DIAGNOSIS — I48.92 PAROXYSMAL ATRIAL FLUTTER (HCC): Primary | ICD-10-CM

## 2024-03-22 DIAGNOSIS — I48.92 PAROXYSMAL ATRIAL FLUTTER (HCC): ICD-10-CM

## 2024-03-22 DIAGNOSIS — I48.92 ATRIAL FLUTTER, UNSPECIFIED TYPE (HCC): ICD-10-CM

## 2024-03-22 DIAGNOSIS — I48.0 PAROXYSMAL ATRIAL FIBRILLATION (HCC): Primary | ICD-10-CM

## 2024-03-22 PROCEDURE — 93000 ELECTROCARDIOGRAM COMPLETE: CPT | Performed by: STUDENT IN AN ORGANIZED HEALTH CARE EDUCATION/TRAINING PROGRAM

## 2024-03-22 PROCEDURE — 99214 OFFICE O/P EST MOD 30 MIN: CPT | Performed by: STUDENT IN AN ORGANIZED HEALTH CARE EDUCATION/TRAINING PROGRAM

## 2024-03-22 NOTE — TELEPHONE ENCOUNTER
Patient scheduled for ANTONY/A flutter/loop implant  at [facility] on   4/16/2024  with Dr Weber.    Patient in the office, she is aware of general instructions, labs test required.     Meds holds: JARDIANCE: Hold this medication four (4) day’s prior to the procedure.   TRULICITY: hold  this medicine for 7 day’s before your procedure including the day of the procedure.     Per Dr Weber, need beebe for Anticoagulant medications (Eliquis/Xarelto)  to start post procedure depend affordability.

## 2024-03-22 NOTE — PROGRESS NOTES
HEART AND VASCULAR  CARDIAC ELECTROPHYSIOLOGY   HEART RHYTHM CENTER  Formerly [facility], [facility]    Outpatient New Consult establish care for assessment management of paroxysmal atrial flutter  Today's Date: 03/22/24        Patient name: Mary Arevalo  YOB: 1980  Sex: female         Chief Complaint: Paroxysmal atrial flutter      ASSESSMENT:  Problem List Items Addressed This Visit       Hypertriglyceridemia    Paroxysmal atrial fibrillation (HCC) - Primary    Paroxysmal atrial flutter (HCC)             PLAN:  Paroxysmal typical atrial flutter  -Appears to be typical flutter on available electrograms  -Continue flecainide 100 mg twice daily  -Continue metoprolol succinate 50 mg daily  -EPS and ablation to be scheduled  -Loop recorder implant to be scheduled    Paroxysmal atrial fibrillation  -None evidence in EMR  -Loop recorder post flutter ablation for further management    Hypertriglyceridemia  -Continue atorvastatin 20 mg daily    Follow up in: To be scheduled post ablation    No orders of the defined types were placed in this encounter.    There are no discontinued medications.      .............................................................................................    HPI/Subjective:     Ms. Mary Arevalo is a 43-year-old female with a past medical history reportedly for atrial fibrillation (only atrial flutter found on review of) status post multiple cardioversions in the past managed with flecainide 100 mg twice daily and metoprolol succinate 25 mg daily was last seen by cardiology on February 20, 2024.  At that time she noted that she would like to come off of flecainide therapy and inquired about options for other management.  Given her age and success rate of atrial flutter ablation, and atrial flutter being the only rhythm thus far documented in the EMR, she was referred to electrophysiology for evaluation for EPS and flutter ablation.    Today she notes she feels well.  When she  "has atrial flutter she feels extreme palpitations and can feel her heart racing.  She states the episodes are sporadic, and usually happen about every 3 years.  Unfortunately, they usually resolve in her going into the ER for management.    We discussed management options including continuing flecainide, versus EP study and ablation.  We went through the risk and benefits of ablation including but not limited to injury to the blood vessels in the groin resulting in bleeding/bruising/hematoma/fistulas, damage to the heart causing a hole in the heart requiring a drain or worst-case neurosurgical repair, damage to the native conduction system, stroke, heart attack, death, damage to esophagus, and damage to the phrenic nerve.  We discussed that while the rhythm looks like atypical flutter, this could be an alternate rhythm.  Given this, we will attempt to induce other rhythms at the time of her study.  This will mean that she will have to be awake for the initial portion.  We also discussed implantable loop recorder as she had atrial fibrillation listed in her history but none was documented.  We discussed the risk and benefits of implant of the loop recorder.  I discussed an option for 3-year monitor versus 6-year monitor, to which the patient replied she feels that 3 years would be adequate.    After discussion, the decision was made to move forward with loop recorder implant and EP study/ablation.  She was sent to scheduling to arrange this procedure.  She will require anticoagulation for the 2 months post procedure.      Complete 12 point ROS reviewed and otherwise non pertinent or negative except as per HPI pertinent positives in Cardiovascular and Respiratory emphasized. Please see paper chart for outpatient clinic patients where the patient completed the 12 point ROS survey.           Past Medical History:   Diagnosis Date    Adnexal cyst     left \"fallopian tube\"    Anesthesia     per pt \"sometimes oxygen level " "drops very low\" not all the time -has occurred more than once under anesthesia\"    Atrial fibrillation (HCC)     [facility] cardio Dr SUZANNA Parkinson--per pt \"did not have ablation and takes flecanide and metoprolol for it\"    Basal cell carcinoma 09/28/2022    left interauricular ear    Dental crowns present     Diabetes mellitus (HCC)     type 2    Elevated LFTs 06/01/2020    GERD (gastroesophageal reflux disease)     GERD (gastroesophageal reflux disease)     Hyperlipidemia     Irregular heart beat     hx of A flutter per pt    Irregular periods     Melanoma (HCC) 2022    Varicella        No Known Allergies  I reviewed the Home Medication list and Allergies in the chart.   Scheduled Meds:  Current Outpatient Medications   Medication Sig Dispense Refill    aspirin (ECOTRIN LOW STRENGTH) 81 mg EC tablet Take 81 mg by mouth daily Hx A flutter -  will verify with cardiology (Patient not taking: Reported on 3/21/2024)      atorvastatin (LIPITOR) 20 mg tablet TAKE 1 TABLET BY MOUTH EVERY DAY (Patient taking differently: Take 20 mg by mouth daily at bedtime) 30 tablet 5    Blood Glucose Monitoring Suppl (ONE TOUCH ULTRA 2) w/Device KIT USE TO TEST BLOOD SUGAR (Patient taking differently: in the morning) 1 each 0    chlorhexidine (PERIDEX) 0.12 % solution Apply to the mouth or throat 2 (two) times a day      dulaglutide (Trulicity) 0.75 MG/0.5ML injection INJECT 0.5 ML (0.75 MG TOTAL) UNDER THE SKIN EVERY 7 DAYS (Patient not taking: Reported on 3/21/2024) 0.5 mL 1    Empagliflozin (JARDIANCE) 10 MG TABS tablet Take 1 tablet (10 mg total) by mouth daily (Patient not taking: Reported on 3/21/2024) 30 tablet 5    famotidine (PEPCID) 20 mg tablet Take 20 mg by mouth daily at bedtime as needed for heartburn      fenofibrate (TRICOR) 54 MG tablet Take 54 mg by mouth daily      flecainide (TAMBOCOR) 100 mg tablet Take 1 tablet (100 mg total) by mouth 2 (two) times a day Per pt only takes at bedtime--- (Patient taking differently: Take " 100 mg by mouth daily at bedtime Per pt only takes at bedtime---) 60 tablet 6    glucose blood (OneTouch Ultra) test strip Test once daily 100 each 3    ibuprofen (MOTRIN) 600 mg tablet Take 1 tablet (600 mg total) by mouth every 6 (six) hours as needed for mild pain 30 tablet 0    Lancets (OneTouch Delica Plus Dcylat45J) MISC TEST 1-2 TIMES A  each 3    metoprolol succinate (TOPROL-XL) 25 mg 24 hr tablet Take 1 tablet (25 mg total) by mouth daily at bedtime 60 tablet 6    norethindrone (AYGESTIN) 5 mg tablet Take 1 tablet (5 mg total) by mouth daily (Patient not taking: Reported on 3/21/2024) 30 tablet 6     No current facility-administered medications for this visit.     PRN Meds:.        Family History   Problem Relation Age of Onset    No Known Problems Mother     Heart disease Father     Hypertension Maternal Grandmother     Bone cancer Maternal Grandmother 93    No Known Problems Maternal Grandfather     No Known Problems Paternal Grandmother     No Known Problems Paternal Grandfather     Colon cancer Maternal Uncle 62    Ovarian cancer Paternal Aunt 56    Heart disease Family     Cancer Family     Thyroid disease Family     Substance Abuse Neg Hx     Mental illness Neg Hx     Anesthesia problems Neg Hx     BRCA2 Positive Neg Hx     BRCA2 Negative Neg Hx     BRCA1 Negative Neg Hx     BRCA1 Positive Neg Hx     BRCA 1/2 Neg Hx     Endometrial cancer Neg Hx     Breast cancer additional onset Neg Hx     Breast cancer Neg Hx        Social History     Socioeconomic History    Marital status: /Civil Union     Spouse name: Elliott    Number of children: 0    Years of education: Not on file    Highest education level: Not on file   Occupational History    Occupation:    Tobacco Use    Smoking status: Former     Current packs/day: 0.00     Average packs/day: 1.5 packs/day for 16.0 years (24.0 ttl pk-yrs)     Types: Cigarettes     Start date: 1996     Quit date: 01/2012     Years since quitting:  12.2    Smokeless tobacco: Never    Tobacco comments:     Former smoker - quit in 2012 -   Vaping Use    Vaping status: Never Used   Substance and Sexual Activity    Alcohol use: Yes     Comment: rare--3x/year    Drug use: No     Comment: Denies any drug use per pt    Sexual activity: Yes     Partners: Male     Birth control/protection: None     Comment: Denies any chest pain or shortness of breath with activity   Other Topics Concern    Not on file   Social History Narrative    Always uses seatbelt    Caffeine use     Social Determinants of Health     Financial Resource Strain: Not on file   Food Insecurity: Not on file   Transportation Needs: Not on file   Physical Activity: Not on file   Stress: Not on file   Social Connections: Not on file   Intimate Partner Violence: Not on file   Housing Stability: Not on file         OBJECTIVE:    LMP 03/13/2024 (Exact Date) There were no vitals filed for this visit.  GEN: No acute distress, Alert and oriented, well appearing  HEENT: Normocephalic atraumatic  CARDIOVASCULAR:  RRR, No murmur, rub, gallops S1,S2  LUNGS: Clear To auscultation bilaterally, normal effort, no rales, rhonchi, crackles   ABDOMEN: Soft, nontender, nondistended  EXTREMITIES/VASCULAR:  No edema. warm an well perfused.  PSYCH: Normal Affect,  linear speech pattern without evidence of psychosis.   NEURO: Grossly intact, moving all extremiteis equal, face symmetric, alert and responsive, no obvious focal defecits   GAIT:  Ambulates normally without difficulty  HEME: No bleeding, bruising, petechia, purpura   SKIN: No significant rashes on visibile skin, warm, no diaphoresis or pallor.     Lab Results:       LABS:      Chemistry        Component Value Date/Time    K 4.3 03/14/2024 0906     03/14/2024 0906    CO2 22 03/14/2024 0906    BUN 15 03/14/2024 0906    CREATININE 0.72 03/14/2024 0906    CREATININE 0.68 03/08/2024 0926        Component Value Date/Time    CALCIUM 9.1 03/08/2024 0926    ALKPHOS 86  "06/16/2023 0828    AST 22 03/14/2024 0906    ALT 25 03/14/2024 0906            No results found for: \"CHOL\"  Lab Results   Component Value Date    HDL 30 (L) 03/14/2024    HDL 34 (L) 03/06/2023    HDL 31 (L) 10/17/2022     Lab Results   Component Value Date    LDLCALC 51 03/14/2024    LDLCALC 46 03/06/2023    LDLCALC 41 10/17/2022     Lab Results   Component Value Date    TRIG 315 (H) 03/14/2024    TRIG 287 (H) 03/06/2023    TRIG 653 (HH) 10/17/2022     No results found for: \"CHOLHDL\"    IMAGING: Mammo diagnostic bilateral w 3d & cad    Result Date: 3/12/2024  Narrative: DIAGNOSIS: Follow-up exam TECHNIQUE: Digital diagnostic mammography was performed. Computer Aided Detection (CAD) analyzed all applicable images. COMPARISONS: Prior breast imaging dated: 07/28/2023, 07/07/2023, 07/07/2023, 02/02/2023, 02/02/2023, 01/10/2023, 01/10/2023, and 12/01/2022 RELEVANT HISTORY: Family Breast Cancer History: History of breast cancer in Neg Hx. Family Medical History: Family medical history includes colon cancer in maternal uncle and ovarian cancer in paternal aunt. Personal History: Hormone history includes birth control. Surgical history includes lumpectomy. No known relevant medical history. RISK ASSESSMENT: 5 Year Tyrer-Cuzick: 0.65% 10 Year Tyrer-Cuzick: 1.57% Lifetime Tyrer-Cuzick: 10.28% TISSUE DENSITY: There are scattered areas of fibroglandular density. INDICATION: Mary Arevalo is a 43 y.o. female presenting for yearly follow up post lumpectomy. FINDINGS: Bilateral There are no suspicious masses, grouped microcalcifications or areas of unexplained architectural distortion. The skin and nipple areolar complex are unremarkable.  Interval postoperative changes in the right breast.     Impression:  No findings of malignancy in either breast. ASSESSMENT/BI-RADS CATEGORY:  Overall: 2 - Benign RECOMMENDATION:      - Diagnostic mammogram in 1 year of the breast(s). Workstation ID: [id number]        Cardiac testing: "     Current EKG performed at today's visit and read by me personally reveals sinus bradycardia with heart rate ranging from 56-57      Cardiac testing:  Taken from outpatient note on 2/20/24     Echo (reported) 2/28/2019, [facility]: normal LV size and function, LVEF 60%, normal RV function, trace MR, mild aortic sclerosis     Echo (reported) 8/2/22, [facility], normal LV size and function, LVEF 55-60%, normal RV size and function, normal RVSP, no valve abnormalities     EKG 6/12/2006 ([facility]) - reviewed tracing, which shows atrial flutter with  bpm     Rhythm strip from hospitalization June 2006 (with ney)       EKG (reported) 1/8/19 @ [facility] - atrial flutter with 2:1 block, 117 bpm  EKG (reported) 1/9/19 @ [facility] - atrial flutter with 2:1 block, 112 bpm  EKG (reported) 1/9/19 @ [facility] - NSR, 1st deg AVB, 81 bpm     EKG 1/5/29, [facility], scanned 1/18/2019 -- reported as atrial fibrillation with RVR; tracings available for my review; in my interpretation this study shows atrial flutter with 2:1 conduction, 150 bpm     EKG 2/20/24 - sinus rhythm, 1st degree AVB, 64 bpm, lateral infarct pattern. Abnormal study.

## 2024-03-22 NOTE — LETTER
CARDIAC ABLATION INSTRUCTIONS     Mary Arevalo   : 1980  MRN: [id number]  [address] [place] [address]    Procedure:  Flutter ablation/loop implant     Procedure Date:  24    Location: [facility]  Address: [place] [address]        Labs to be done on:       CMP /  CBC (FASTING 8 HOURS)      Medication Hold:   JARDIANCE: DO NOT take this medication four (4) day’s prior to the procedure. Last dose on 24.  TRULICITY: If apply WEEKLY, DO NOT apply this medicine for 7 day’s before your procedure including the day of the procedure. Last dose on 24.        Arrival Time: The Hospital will contact you the day prior to your procedure, usually between 4PM - 6PM to instruct you on the time and place to report. If you do not hear from a [facility]  by 6PM the evening prior to your procedure, please contact the campus you are scheduled at.     DO NOT drink or eat anything after midnight the night prior to your procedure. You may have a minimal amount of water with your AM medications. If your procedure is scheduled after 12:00 noon, you may have clear liquids until 8:00AM the morning of your procedure. (Clear liquids: 7UP, ginger ale, jello, or broth.)    Arrange for a responsible adult to drive you to and from the hospital.    You should continue to take your morning medications with a sip of water UNLESS ADVISED OTHERWISE.       DO NOT stop taking Plavix or Aspirin unless advised otherwise.     If you are currently taking Fish Oil, Krill oil and/or Vitamin E please DO NOT TAKE FOR A WEEK PRIOR TO PROCEDURE.     If you are diabetic, DO NOT take any of your diabetic pills the morning of your procedure. Oral diabetic medications may include Glucophage, Prandin, Glyburide, Micronase, Avandia, Glucovance, Precose, Glynase, and Starlix.     Bring a list of daily medications, vitamins, minerals, herbals and nutritional supplements you take.  Please include dosages and the times you take them each day.     If you are packing an overnight bag, pack minimal clothing, you will be given hospital sleepwear.   DO NOT bring money, valuables or jewelry. The wedding band is ok.     If you use CPAP machine, bring it to the hospital.      Bring your Photo ID and Insurance cards with you.     Please shower before your procedure and do not use powders or lotions.     Please notify us if you have been admitted to the hospital within 30 days.      FAILURE TO FOLLOW ANY OF THESE INSTRUCTIONS COULD RESULT IN THE CANCELLATION OF YOUR PROCEDURE      Please call [phone] if you do not hear from the  by 6:00PM the night before your procedure.    All patients enter through ENTRANCE B. [employer] Parking is available free of charge or park on Parking Deck B.        Thank you,   Michaela Cooper  Surgery Coordinator  [facility]   [address] [place] [id number]  Ph: [phone]

## 2024-03-25 ENCOUNTER — TELEPHONE (OUTPATIENT)
Dept: CARDIOLOGY CLINIC | Facility: CLINIC | Age: 44
End: 2024-03-25

## 2024-03-25 PROCEDURE — 88305 TISSUE EXAM BY PATHOLOGIST: CPT | Performed by: STUDENT IN AN ORGANIZED HEALTH CARE EDUCATION/TRAINING PROGRAM

## 2024-03-25 RX ORDER — DULAGLUTIDE 0.75 MG/.5ML
0.75 INJECTION, SOLUTION SUBCUTANEOUS
Qty: 2 ML | Refills: 5 | Status: SHIPPED | OUTPATIENT
Start: 2024-03-25

## 2024-03-25 NOTE — TELEPHONE ENCOUNTER
Called [facility] for med pricing. Spoke to Tono.     Xarelto 20 mg, #30 - Copay:  **Quantity limit - Can only get 30 day supply**  Tier 1 - $0 - $3.00 (retail pharmacy)      Eliquis 5 mg, #60 - Copay:  **Quantity limit - Can only get 30 day supply**  Tier 1 - $0 - $3.00 (retail pharmacy)      Call reference Patience LANDIN. 03/25/2024

## 2024-04-04 ENCOUNTER — OFFICE VISIT (OUTPATIENT)
Dept: GYNECOLOGY | Facility: CLINIC | Age: 44
End: 2024-04-04

## 2024-04-04 VITALS
DIASTOLIC BLOOD PRESSURE: 78 MMHG | BODY MASS INDEX: 31.54 KG/M2 | HEIGHT: 62 IN | SYSTOLIC BLOOD PRESSURE: 116 MMHG | WEIGHT: 171.4 LBS

## 2024-04-04 DIAGNOSIS — Z98.890 POSTOPERATIVE STATE: ICD-10-CM

## 2024-04-04 DIAGNOSIS — Z98.890 HISTORY OF D&C: ICD-10-CM

## 2024-04-04 DIAGNOSIS — N85.02 ENDOMETRIAL HYPERPLASIA WITH ATYPIA: Primary | ICD-10-CM

## 2024-04-04 DIAGNOSIS — N92.6 IRREGULAR MENSTRUAL BLEEDING: ICD-10-CM

## 2024-04-04 DIAGNOSIS — R93.89 THICKENED ENDOMETRIUM: ICD-10-CM

## 2024-04-04 PROCEDURE — 99024 POSTOP FOLLOW-UP VISIT: CPT | Performed by: OBSTETRICS & GYNECOLOGY

## 2024-04-04 RX ORDER — MEDROXYPROGESTERONE ACETATE 5 MG/1
5 TABLET ORAL DAILY
Qty: 30 TABLET | Refills: 1 | Status: SHIPPED | OUTPATIENT
Start: 2024-04-04 | End: 2024-06-03

## 2024-04-04 NOTE — PROGRESS NOTES
"Assessment/Plan:    Diagnoses and all orders for this visit:    Endometrial hyperplasia with atypia  -     medroxyPROGESTERone (PROVERA) 5 mg tablet; Take 1 tablet (5 mg total) by mouth daily  -     US pelvis complete non OB; Future    Thickened endometrium  -     medroxyPROGESTERone (PROVERA) 5 mg tablet; Take 1 tablet (5 mg total) by mouth daily  -     US pelvis complete non OB; Future    Irregular menstrual bleeding  -     US pelvis complete non OB; Future    Postoperative state    History of D&C        Subjective: Here for postoperative check     Patient ID: Mary Arevalo is a 43 y.o. female.    HPI  44-year-old female, nulliparous history of irregular menstruation and dysmenorrhea.  Pelvic ultrasound revealed endometrial thickening.  Endometrial biopsy revealed endometrial hyperplasia with focal atypia.  D&C hysteroscopy were done March 20, 2024.  Patient tolerated procedure without difficulty.  She has not had any problems since being discharged.  She recently had her diabetes medications adjusted she was not getting her cycles on a regular basis prior to her D&C.  Endometrial curettings revealed fragments of benign endometrial polyp background of benign endometrium no dysplasia or malignancy were identified no further signs of hyperplasia was identified.  Because a history of endometrial hyperplasia I recommend we start her on Provera 5 mg daily.  Follow-up ultrasound about 3 months time.  She is aware she may not have a menstrual cycle while taking this medication.  Patient agrees with this plan will follow-up at that time all questions answered.    Review of Systems unchanged    Objective: No acute distress  /78 (BP Location: Left arm, Patient Position: Sitting, Cuff Size: Standard)   Ht 5' 2\" (1.575 m)   Wt 77.7 kg (171 lb 6.4 oz)   LMP 03/13/2024 (Exact Date)   BMI 31.35 kg/m²      Physical Exam  Vitals and nursing note reviewed.   Constitutional:       Appearance: Normal appearance. She is " obese.   HENT:      Head: Normocephalic.   Eyes:      Extraocular Movements: Extraocular movements intact.      Pupils: Pupils are equal, round, and reactive to light.   Pulmonary:      Effort: Pulmonary effort is normal.   Genitourinary:     Comments:  Pelvic exam deferred  Musculoskeletal:         General: Normal range of motion.      Cervical back: Normal range of motion.   Neurological:      Mental Status: She is alert and oriented to person, place, and time.   Psychiatric:         Mood and Affect: Mood normal.         Behavior: Behavior normal.         Thought Content: Thought content normal.         Judgment: Judgment normal.

## 2024-04-06 LAB
ALBUMIN SERPL-MCNC: 4.3 G/DL (ref 3.9–4.9)
ALBUMIN/GLOB SERPL: 2.2 {RATIO} (ref 1.2–2.2)
ALP SERPL-CCNC: 88 IU/L (ref 44–121)
ALT SERPL-CCNC: 35 IU/L (ref 0–32)
AST SERPL-CCNC: 26 IU/L (ref 0–40)
BASOPHILS # BLD AUTO: 0.1 X10E3/UL (ref 0–0.2)
BASOPHILS NFR BLD AUTO: 1 %
BILIRUB SERPL-MCNC: 0.9 MG/DL (ref 0–1.2)
BUN SERPL-MCNC: 16 MG/DL (ref 6–24)
BUN/CREAT SERPL: 23 (ref 9–23)
CALCIUM SERPL-MCNC: 9.4 MG/DL (ref 8.7–10.2)
CHLORIDE SERPL-SCNC: 103 MMOL/L (ref 96–106)
CO2 SERPL-SCNC: 24 MMOL/L (ref 20–29)
CREAT SERPL-MCNC: 0.69 MG/DL (ref 0.57–1)
EGFR: 111 ML/MIN/1.73
EOSINOPHIL # BLD AUTO: 0.2 X10E3/UL (ref 0–0.4)
EOSINOPHIL NFR BLD AUTO: 2 %
ERYTHROCYTE [DISTWIDTH] IN BLOOD BY AUTOMATED COUNT: 15.1 % (ref 11.7–15.4)
GLOBULIN SER-MCNC: 2 G/DL (ref 1.5–4.5)
GLUCOSE SERPL-MCNC: 146 MG/DL (ref 70–99)
HCT VFR BLD AUTO: 50.5 % (ref 34–46.6)
HGB BLD-MCNC: 15.8 G/DL (ref 11.1–15.9)
IMM GRANULOCYTES # BLD: 0 X10E3/UL (ref 0–0.1)
IMM GRANULOCYTES NFR BLD: 1 %
LYMPHOCYTES # BLD AUTO: 2.7 X10E3/UL (ref 0.7–3.1)
LYMPHOCYTES NFR BLD AUTO: 31 %
MCH RBC QN AUTO: 27.3 PG (ref 26.6–33)
MCHC RBC AUTO-ENTMCNC: 31.3 G/DL (ref 31.5–35.7)
MCV RBC AUTO: 87 FL (ref 79–97)
MONOCYTES # BLD AUTO: 0.5 X10E3/UL (ref 0.1–0.9)
MONOCYTES NFR BLD AUTO: 5 %
NEUTROPHILS # BLD AUTO: 5.3 X10E3/UL (ref 1.4–7)
NEUTROPHILS NFR BLD AUTO: 60 %
PLATELET # BLD AUTO: 194 X10E3/UL (ref 150–450)
POTASSIUM SERPL-SCNC: 4.4 MMOL/L (ref 3.5–5.2)
PROT SERPL-MCNC: 6.3 G/DL (ref 6–8.5)
RBC # BLD AUTO: 5.79 X10E6/UL (ref 3.77–5.28)
SODIUM SERPL-SCNC: 144 MMOL/L (ref 134–144)
WBC # BLD AUTO: 8.7 X10E3/UL (ref 3.4–10.8)

## 2024-04-16 ENCOUNTER — ANESTHESIA (OUTPATIENT)
Dept: NON INVASIVE DIAGNOSTICS | Facility: HOSPITAL | Age: 44
End: 2024-04-16
Payer: COMMERCIAL

## 2024-04-16 ENCOUNTER — ANESTHESIA EVENT (OUTPATIENT)
Dept: NON INVASIVE DIAGNOSTICS | Facility: HOSPITAL | Age: 44
End: 2024-04-16
Payer: COMMERCIAL

## 2024-04-16 ENCOUNTER — APPOINTMENT (OUTPATIENT)
Dept: NON INVASIVE DIAGNOSTICS | Facility: HOSPITAL | Age: 44
End: 2024-04-16
Attending: STUDENT IN AN ORGANIZED HEALTH CARE EDUCATION/TRAINING PROGRAM
Payer: COMMERCIAL

## 2024-04-16 ENCOUNTER — HOSPITAL ENCOUNTER (OUTPATIENT)
Facility: HOSPITAL | Age: 44
Setting detail: OUTPATIENT SURGERY
Discharge: HOME | End: 2024-04-16
Attending: STUDENT IN AN ORGANIZED HEALTH CARE EDUCATION/TRAINING PROGRAM | Admitting: STUDENT IN AN ORGANIZED HEALTH CARE EDUCATION/TRAINING PROGRAM
Payer: COMMERCIAL

## 2024-04-16 VITALS
HEIGHT: 62 IN | SYSTOLIC BLOOD PRESSURE: 103 MMHG | RESPIRATION RATE: 17 BRPM | WEIGHT: 173 LBS | TEMPERATURE: 99.7 F | BODY MASS INDEX: 31.83 KG/M2 | DIASTOLIC BLOOD PRESSURE: 64 MMHG | OXYGEN SATURATION: 94 % | HEART RATE: 70 BPM

## 2024-04-16 DIAGNOSIS — I48.0 PAROXYSMAL ATRIAL FIBRILLATION (HCC): ICD-10-CM

## 2024-04-16 DIAGNOSIS — Z86.79 S/P ABLATION OF ATRIAL FLUTTER: Primary | ICD-10-CM

## 2024-04-16 DIAGNOSIS — I48.92 PAROXYSMAL ATRIAL FLUTTER (HCC): ICD-10-CM

## 2024-04-16 DIAGNOSIS — Z98.890 S/P ABLATION OF ATRIAL FLUTTER: Primary | ICD-10-CM

## 2024-04-16 DIAGNOSIS — I48.92 ATRIAL FLUTTER, UNSPECIFIED TYPE (HCC): ICD-10-CM

## 2024-04-16 PROBLEM — Z95.818 STATUS POST PLACEMENT OF IMPLANTABLE LOOP RECORDER: Status: ACTIVE | Noted: 2024-04-16

## 2024-04-16 LAB
ANION GAP SERPL CALCULATED.3IONS-SCNC: 5 MMOL/L (ref 4–13)
AORTIC ROOT: 2.5 CM
BUN SERPL-MCNC: 13 MG/DL (ref 5–25)
CALCIUM SERPL-MCNC: 9 MG/DL (ref 8.4–10.2)
CHLORIDE SERPL-SCNC: 107 MMOL/L (ref 96–108)
CO2 SERPL-SCNC: 29 MMOL/L (ref 21–32)
CREAT SERPL-MCNC: 0.57 MG/DL (ref 0.6–1.3)
ERYTHROCYTE [DISTWIDTH] IN BLOOD BY AUTOMATED COUNT: 14.6 % (ref 11.6–15.1)
EXT PREGNANCY TEST URINE: NEGATIVE
EXT. CONTROL: NORMAL
GFR SERPL CREATININE-BSD FRML MDRD: 114 ML/MIN/1.73SQ M
GLUCOSE P FAST SERPL-MCNC: 196 MG/DL (ref 65–99)
GLUCOSE SERPL-MCNC: 107 MG/DL (ref 65–140)
GLUCOSE SERPL-MCNC: 163 MG/DL (ref 65–140)
GLUCOSE SERPL-MCNC: 196 MG/DL (ref 65–140)
HCT VFR BLD AUTO: 51.5 % (ref 34.8–46.1)
HGB BLD-MCNC: 15.6 G/DL (ref 11.5–15.4)
INR PPP: 1 (ref 0.84–1.19)
MCH RBC QN AUTO: 26.7 PG (ref 26.8–34.3)
MCHC RBC AUTO-ENTMCNC: 30.3 G/DL (ref 31.4–37.4)
MCV RBC AUTO: 88 FL (ref 82–98)
PLATELET # BLD AUTO: 209 THOUSANDS/UL (ref 149–390)
PMV BLD AUTO: 9.4 FL (ref 8.9–12.7)
POTASSIUM SERPL-SCNC: 4.1 MMOL/L (ref 3.5–5.3)
PROTHROMBIN TIME: 13.1 SECONDS (ref 11.6–14.5)
RBC # BLD AUTO: 5.84 MILLION/UL (ref 3.81–5.12)
SL CV LV EF: 60
SODIUM SERPL-SCNC: 141 MMOL/L (ref 135–147)
WBC # BLD AUTO: 7.04 THOUSAND/UL (ref 4.31–10.16)

## 2024-04-16 PROCEDURE — 93653 COMPRE EP EVAL TX SVT: CPT | Performed by: STUDENT IN AN ORGANIZED HEALTH CARE EDUCATION/TRAINING PROGRAM

## 2024-04-16 PROCEDURE — 93320 DOPPLER ECHO COMPLETE: CPT | Performed by: INTERNAL MEDICINE

## 2024-04-16 PROCEDURE — 93312 ECHO TRANSESOPHAGEAL: CPT

## 2024-04-16 PROCEDURE — 33285 INSJ SUBQ CAR RHYTHM MNTR: CPT | Performed by: STUDENT IN AN ORGANIZED HEALTH CARE EDUCATION/TRAINING PROGRAM

## 2024-04-16 PROCEDURE — 80048 BASIC METABOLIC PNL TOTAL CA: CPT | Performed by: PHYSICIAN ASSISTANT

## 2024-04-16 PROCEDURE — 81025 URINE PREGNANCY TEST: CPT | Performed by: SURGERY

## 2024-04-16 PROCEDURE — 82948 REAGENT STRIP/BLOOD GLUCOSE: CPT

## 2024-04-16 PROCEDURE — 85610 PROTHROMBIN TIME: CPT | Performed by: PHYSICIAN ASSISTANT

## 2024-04-16 PROCEDURE — 76937 US GUIDE VASCULAR ACCESS: CPT | Performed by: STUDENT IN AN ORGANIZED HEALTH CARE EDUCATION/TRAINING PROGRAM

## 2024-04-16 PROCEDURE — C1764 EVENT RECORDER, CARDIAC: HCPCS | Performed by: STUDENT IN AN ORGANIZED HEALTH CARE EDUCATION/TRAINING PROGRAM

## 2024-04-16 PROCEDURE — 93662 INTRACARDIAC ECG (ICE): CPT | Performed by: STUDENT IN AN ORGANIZED HEALTH CARE EDUCATION/TRAINING PROGRAM

## 2024-04-16 PROCEDURE — 93312 ECHO TRANSESOPHAGEAL: CPT | Performed by: INTERNAL MEDICINE

## 2024-04-16 PROCEDURE — C1759 CATH, INTRA ECHOCARDIOGRAPHY: HCPCS | Performed by: STUDENT IN AN ORGANIZED HEALTH CARE EDUCATION/TRAINING PROGRAM

## 2024-04-16 PROCEDURE — C1730 CATH, EP, 19 OR FEW ELECT: HCPCS | Performed by: STUDENT IN AN ORGANIZED HEALTH CARE EDUCATION/TRAINING PROGRAM

## 2024-04-16 PROCEDURE — C1894 INTRO/SHEATH, NON-LASER: HCPCS | Performed by: STUDENT IN AN ORGANIZED HEALTH CARE EDUCATION/TRAINING PROGRAM

## 2024-04-16 PROCEDURE — 93005 ELECTROCARDIOGRAM TRACING: CPT

## 2024-04-16 PROCEDURE — NC001 PR NO CHARGE: Performed by: STUDENT IN AN ORGANIZED HEALTH CARE EDUCATION/TRAINING PROGRAM

## 2024-04-16 PROCEDURE — 85027 COMPLETE CBC AUTOMATED: CPT | Performed by: PHYSICIAN ASSISTANT

## 2024-04-16 PROCEDURE — 93325 DOPPLER ECHO COLOR FLOW MAPG: CPT | Performed by: INTERNAL MEDICINE

## 2024-04-16 PROCEDURE — C1760 CLOSURE DEV, VASC: HCPCS | Performed by: STUDENT IN AN ORGANIZED HEALTH CARE EDUCATION/TRAINING PROGRAM

## 2024-04-16 DEVICE — ICM LNQ22 LINQ II USA
Type: IMPLANTABLE DEVICE | Site: CHEST  WALL | Status: FUNCTIONAL
Brand: LINQ II™

## 2024-04-16 DEVICE — DVC VASC CLSR VASCADE MVP 6-12FR: Type: IMPLANTABLE DEVICE | Site: GROIN | Status: FUNCTIONAL

## 2024-04-16 RX ORDER — FENTANYL CITRATE/PF 50 MCG/ML
12.5 SYRINGE (ML) INJECTION
Status: CANCELLED | OUTPATIENT
Start: 2024-04-16

## 2024-04-16 RX ORDER — CEFAZOLIN SODIUM 1 G/3ML
INJECTION, POWDER, FOR SOLUTION INTRAMUSCULAR; INTRAVENOUS AS NEEDED
Status: DISCONTINUED | OUTPATIENT
Start: 2024-04-16 | End: 2024-04-18

## 2024-04-16 RX ORDER — SODIUM CHLORIDE 9 MG/ML
20 INJECTION, SOLUTION INTRAVENOUS ONCE
Status: DISCONTINUED | OUTPATIENT
Start: 2024-04-16 | End: 2024-04-16 | Stop reason: HOSPADM

## 2024-04-16 RX ORDER — FENTANYL CITRATE 50 UG/ML
INJECTION, SOLUTION INTRAMUSCULAR; INTRAVENOUS AS NEEDED
Status: DISCONTINUED | OUTPATIENT
Start: 2024-04-16 | End: 2024-04-18

## 2024-04-16 RX ORDER — ONDANSETRON 2 MG/ML
INJECTION INTRAMUSCULAR; INTRAVENOUS AS NEEDED
Status: DISCONTINUED | OUTPATIENT
Start: 2024-04-16 | End: 2024-04-18

## 2024-04-16 RX ORDER — HYDROMORPHONE HCL IN WATER/PF 6 MG/30 ML
0.2 PATIENT CONTROLLED ANALGESIA SYRINGE INTRAVENOUS
Status: DISCONTINUED | OUTPATIENT
Start: 2024-04-16 | End: 2024-04-16 | Stop reason: HOSPADM

## 2024-04-16 RX ORDER — ONDANSETRON 2 MG/ML
4 INJECTION INTRAMUSCULAR; INTRAVENOUS ONCE AS NEEDED
Status: DISCONTINUED | OUTPATIENT
Start: 2024-04-16 | End: 2024-04-16 | Stop reason: HOSPADM

## 2024-04-16 RX ORDER — ROCURONIUM BROMIDE 10 MG/ML
INJECTION, SOLUTION INTRAVENOUS AS NEEDED
Status: DISCONTINUED | OUTPATIENT
Start: 2024-04-16 | End: 2024-04-18

## 2024-04-16 RX ORDER — PROPOFOL 10 MG/ML
INJECTION, EMULSION INTRAVENOUS AS NEEDED
Status: DISCONTINUED | OUTPATIENT
Start: 2024-04-16 | End: 2024-04-18

## 2024-04-16 RX ORDER — SODIUM CHLORIDE 9 MG/ML
20 INJECTION, SOLUTION INTRAVENOUS CONTINUOUS
Status: DISCONTINUED | OUTPATIENT
Start: 2024-04-16 | End: 2024-04-16 | Stop reason: HOSPADM

## 2024-04-16 RX ORDER — ONDANSETRON 2 MG/ML
4 INJECTION INTRAMUSCULAR; INTRAVENOUS ONCE AS NEEDED
Status: CANCELLED | OUTPATIENT
Start: 2024-04-16

## 2024-04-16 RX ORDER — ACETAMINOPHEN 325 MG/1
650 TABLET ORAL EVERY 4 HOURS PRN
Status: DISCONTINUED | OUTPATIENT
Start: 2024-04-16 | End: 2024-04-16 | Stop reason: HOSPADM

## 2024-04-16 RX ORDER — LIDOCAINE HYDROCHLORIDE 10 MG/ML
INJECTION, SOLUTION EPIDURAL; INFILTRATION; INTRACAUDAL; PERINEURAL CODE/TRAUMA/SEDATION MEDICATION
Status: DISCONTINUED | OUTPATIENT
Start: 2024-04-16 | End: 2024-04-16 | Stop reason: HOSPADM

## 2024-04-16 RX ORDER — HYDROMORPHONE HCL IN WATER/PF 6 MG/30 ML
0.2 PATIENT CONTROLLED ANALGESIA SYRINGE INTRAVENOUS
Status: CANCELLED | OUTPATIENT
Start: 2024-04-16

## 2024-04-16 RX ORDER — MIDAZOLAM HYDROCHLORIDE 2 MG/2ML
INJECTION, SOLUTION INTRAMUSCULAR; INTRAVENOUS AS NEEDED
Status: DISCONTINUED | OUTPATIENT
Start: 2024-04-16 | End: 2024-04-18

## 2024-04-16 RX ORDER — SODIUM CHLORIDE 9 MG/ML
INJECTION, SOLUTION INTRAVENOUS CONTINUOUS PRN
Status: DISCONTINUED | OUTPATIENT
Start: 2024-04-16 | End: 2024-04-18

## 2024-04-16 RX ORDER — FENTANYL CITRATE/PF 50 MCG/ML
12.5 SYRINGE (ML) INJECTION
Status: DISCONTINUED | OUTPATIENT
Start: 2024-04-16 | End: 2024-04-16 | Stop reason: HOSPADM

## 2024-04-16 RX ORDER — PANTOPRAZOLE SODIUM 40 MG/10ML
40 INJECTION, POWDER, LYOPHILIZED, FOR SOLUTION INTRAVENOUS ONCE
Status: DISCONTINUED | OUTPATIENT
Start: 2024-04-16 | End: 2024-04-16 | Stop reason: HOSPADM

## 2024-04-16 RX ORDER — LIDOCAINE HYDROCHLORIDE 10 MG/ML
INJECTION, SOLUTION EPIDURAL; INFILTRATION; INTRACAUDAL; PERINEURAL AS NEEDED
Status: DISCONTINUED | OUTPATIENT
Start: 2024-04-16 | End: 2024-04-18

## 2024-04-16 RX ADMIN — NOREPINEPHRINE BITARTRATE 1 MCG/MIN: 1 INJECTION, SOLUTION, CONCENTRATE INTRAVENOUS at 12:47

## 2024-04-16 RX ADMIN — PROPOFOL 30 MG: 10 INJECTION, EMULSION INTRAVENOUS at 15:17

## 2024-04-16 RX ADMIN — NOREPINEPHRINE BITARTRATE 8 MCG: 1 INJECTION, SOLUTION, CONCENTRATE INTRAVENOUS at 13:55

## 2024-04-16 RX ADMIN — NOREPINEPHRINE BITARTRATE 8 MCG: 1 INJECTION, SOLUTION, CONCENTRATE INTRAVENOUS at 14:34

## 2024-04-16 RX ADMIN — NOREPINEPHRINE BITARTRATE 8 MCG: 1 INJECTION, SOLUTION, CONCENTRATE INTRAVENOUS at 13:16

## 2024-04-16 RX ADMIN — LIDOCAINE HYDROCHLORIDE 50 MG: 10 INJECTION, SOLUTION EPIDURAL; INFILTRATION; INTRACAUDAL; PERINEURAL at 12:35

## 2024-04-16 RX ADMIN — SODIUM CHLORIDE: 0.9 INJECTION, SOLUTION INTRAVENOUS at 12:28

## 2024-04-16 RX ADMIN — PROPOFOL 50 MG: 10 INJECTION, EMULSION INTRAVENOUS at 15:11

## 2024-04-16 RX ADMIN — ROCURONIUM BROMIDE 50 MG: 10 INJECTION, SOLUTION INTRAVENOUS at 12:35

## 2024-04-16 RX ADMIN — ONDANSETRON 4 MG: 2 INJECTION INTRAMUSCULAR; INTRAVENOUS at 12:18

## 2024-04-16 RX ADMIN — PROPOFOL 100 MG: 10 INJECTION, EMULSION INTRAVENOUS at 12:35

## 2024-04-16 RX ADMIN — SUGAMMADEX 157 MG: 100 INJECTION, SOLUTION INTRAVENOUS at 15:02

## 2024-04-16 RX ADMIN — SODIUM CHLORIDE 20 ML/HR: 0.9 INJECTION, SOLUTION INTRAVENOUS at 09:37

## 2024-04-16 RX ADMIN — NOREPINEPHRINE BITARTRATE 2 MCG/MIN: 1 INJECTION, SOLUTION, CONCENTRATE INTRAVENOUS at 13:07

## 2024-04-16 RX ADMIN — NOREPINEPHRINE BITARTRATE 8 MCG: 1 INJECTION, SOLUTION, CONCENTRATE INTRAVENOUS at 13:07

## 2024-04-16 RX ADMIN — FENTANYL CITRATE 75 MCG: 50 INJECTION INTRAMUSCULAR; INTRAVENOUS at 13:02

## 2024-04-16 RX ADMIN — CEFAZOLIN 2000 MG: 1 INJECTION, POWDER, FOR SOLUTION INTRAMUSCULAR; INTRAVENOUS at 13:18

## 2024-04-16 RX ADMIN — PROPOFOL 50 MG: 10 INJECTION, EMULSION INTRAVENOUS at 15:06

## 2024-04-16 RX ADMIN — FENTANYL CITRATE 25 MCG: 50 INJECTION INTRAMUSCULAR; INTRAVENOUS at 12:35

## 2024-04-16 RX ADMIN — MIDAZOLAM 2 MG: 1 INJECTION INTRAMUSCULAR; INTRAVENOUS at 12:18

## 2024-04-16 RX ADMIN — SODIUM CHLORIDE: 0.9 INJECTION, SOLUTION INTRAVENOUS at 15:12

## 2024-04-16 NOTE — ANESTHESIA POSTPROCEDURE EVALUATION
Post-Op Assessment Note    CV Status:  Stable  Pain Score: 0    Pain management: adequate       Mental Status:  Alert and awake   Hydration Status:  Stable   PONV Controlled:  None   Airway Patency:  Patent     Post Op Vitals Reviewed: Yes    No anethesia notable event occurred.    Staff: CRNA               BP   100/68   Temp   97.4   Pulse  58   Resp   14   SpO2   94% 4 l mask

## 2024-04-16 NOTE — ANESTHESIA PREPROCEDURE EVALUATION
Procedure:  Cardiac eps/aflutter ablation (Chest)  Cardiac loop recorder implant (Chest)    Relevant Problems   CARDIO   (+) Hyperlipidemia associated with type 2 diabetes mellitus  (HCC)   (+) Hypertriglyceridemia   (+) Paroxysmal atrial fibrillation (HCC)   (+) Paroxysmal atrial flutter (HCC)      ENDO   (+) Type 2 diabetes mellitus without complication, without long-term current use of insulin (HCC)      GI/HEPATIC   (+) GERD (gastroesophageal reflux disease)        Physical Exam    Airway    Mallampati score: I  TM Distance: >3 FB  Neck ROM: full     Dental   No notable dental hx     Cardiovascular  Rhythm: regular, Rate: normal    Pulmonary  Pulmonary exam normal     Other Findings  post-pubertal.      Anesthesia Plan  ASA Score- 3     Anesthesia Type- general with ASA Monitors.         Additional Monitors:     Airway Plan: ETT.           Plan Factors-Exercise tolerance (METS): >4 METS.    Chart reviewed. EKG reviewed.  Existing labs reviewed.     Patient is not a current smoker.              Induction- intravenous.    Postoperative Plan- Plan for postoperative opioid use. Planned trial extubation    Informed Consent- Anesthetic plan and risks discussed with patient.  I personally reviewed this patient with the CRNA. Discussed and agreed on the Anesthesia Plan with the CRNA..              K 4.4  Cr 0.69  Hgb 15.8  Plt 194  Negative pregnancy test    NPO  No problems with ane  Cleans for a living  Denies CP/SOB  Denies GERD    POCT ECG  Order: [id number]   Status: Final result       Visible to patient: Yes (not seen)       Next appt: Today at 10:50 AM in Cardiology Imaging ([facility] CATH LAB 4)       Dx: Atrial flutter, unspecified type (HCC)    0 Result Notes        Impression    Sinus bradycardia      Specimen Collected: 03/22/24 10:24 Last Resulted: 03/22/24 10:24

## 2024-04-16 NOTE — Clinical Note
Site (pad location): lateral thigh. Laterality: bilateral. Grounding pad site assessment: skin integrity intact.

## 2024-04-16 NOTE — DISCHARGE INSTR - AVS FIRST PAGE
PLEASE NOTE THE FOLLOWING MEDICATION RECOMMENDATIONS:  Take Eliquis 5 mg twice daily for 2 months.   Call the office [phone] if you need samples of Eliquis  Continue your other medications as taken previously       RESTRICTIONS:  No heavy lifting or strenuous activity for one week.    No soaking in a bath tub/hot tub/swimming pool for one week or until groin heals. You may shower - please let soap and water run over the groins, no scrubbing, and pat the area dry. Please place band-aid on groins daily for up to five days, but you may remove sooner if no issues are noted.     If you notice ongoing bleeding, swelling, or firm lumps in groin near ablation incision, please contact Dr. Weber' office - [phone].      LOOP RECORDER INSTRUCTIONS:  - Keep loop recorder incision dry for one week. Do not use lotions, powders, creams, or ointments on incision.     - Remove outer bandage 24-48 hours after procedure. There is waterproof glue present over the incision. This should keep the incision dry. Avoid picking at the glue or peeling it off. The glue will come off in its own time.     - Because the glue is waterproof, it is safe to shower if the glue remains on over the incision. It is ok to let the soap and water gently run over the incision. Avoid direct exposure to the stream of water. Do not soak the incision. Do not scrub. Pat dry.    - If the glue comes off in less that 7 days, place a waterproof bandage over the incision before showering.    - If present, leave underlying steri-strips in place. They will either fall off on their own or will be removed at the 2 week follow up appointment. If present, leave stitches in place. They will be removed at the 2 week follow up appointment.    - Please call the office if you notice redness, swelling, bleeding, or drainage from incision or if you develop fevers.      Cardiac Loop Recorder Insertion      WHAT YOU SHOULD KNOW:    A cardiac loop recorder is a device used  to diagnose heart rhythm problems, such as a fast or irregular heartbeat. It is implanted in your left chest, just under the skin. The device records a pattern of your heart's rhythm, called an EKG. Your device records automatic EKGs, depending on how your caregiver programs it. You may also receive a handheld controller. You press a button on the controller when you have symptoms, such as dizziness, lightheadedness, or palpitations. The device will record an EKG at that moment. The recording can help your caregiver see if your symptoms may be caused by heart rhythm problems. Your caregiver will remove the device after it has collected enough data. You may need the device for up to 3 years. The procedure to remove the device is similar to the procedure used to implant it.      AFTER YOU LEAVE:    Follow up with your cardiologist as directed: You will need to present to the office in 2 weeks. A nurse at the device clinic will check your incision and remove any stitches or steri strips that may be present. They may also program your device settings again. They will retrieve data from the device every 1 to 3 months with a monitor held over your skin. You may be able to transmit data from your device from home as well. You will do this by calling a number provided by the device clinic or as they have instructed you. Ask for information about this process. Write down your questions so you remember to ask them during your visits.      Wound care: Keep loop recorder incision dry for one week. Do not use lotions, powders, creams, or ointments on incision. Remove outer bandage 24-48 hours after procedure. There is waterproof glue present over the incision. This should keep the incision dry. Avoid picking at the glue or peeling it off. The glue will come off in its own time. Because the glue is waterproof, it is safe to shower if the glue remains on over the incision. It is ok to let the soap and water gently run over the  incision. Avoid direct exposure to the stream of water. Do not soak the incision. Do not scrub. Pat dry.If the glue comes off in less that 7 days, place a waterproof bandage over the incision before showering. If present, leave underlying steri-strips in place. They will either fall off on their own or will be removed at the 2 week follow up appointment. If present, leave stitches in place. They will be removed at the 2 week follow up appointment.    Please call the office if you notice redness, swelling, bleeding, or drainage from incision or if you develop fevers.    Return to activity: If you received anesthesia, you will not be able to drive for 24 hours. Otherwise, most people can return to normal activities soon after the procedure. Your cardiologist may want to know if your work involves electrical current or high-voltage equipment. Ask about other electrical items that could interfere with your cardiac loop recorder.      Contact your cardiologist if:   You have a fever or chills.    Your wound is red, swollen, or draining pus.    You have questions or concerns about your condition or care.    Seek care immediately or call 911 if:   You feel weak, dizzy, or faint.    You lose consciousness.      © 2014 [employer]. Information is for End User's use only and may not be sold, redistributed or otherwise used for commercial purposes. All illustrations and images included in CareNotes® are the copyrighted property of [employer] or [employer].    The above information is an  only. It is not intended as medical advice for individual conditions or treatments. Talk to your doctor, nurse or pharmacist before following any medical regimen to see if it is safe and effective for you.

## 2024-04-16 NOTE — Clinical Note
----- Message from Nila Avalos NP sent at 1/7/2023  3:52 PM CST -----  Kidney function, electrolytes normal   EP catheter inserted at the coronary sinus.

## 2024-04-16 NOTE — H&P
Assessment/Plan     Atrial flutter/loop recorder implant  -plan for EPS and ablation  -loop recorder implant for further monitoring.                HPI:  Ms. Mary Arevalo is a 43-year-old female with a past medical history reportedly for atrial fibrillation (only atrial flutter found on review of) status post multiple cardioversions in the past managed with flecainide 100 mg twice daily and metoprolol succinate 25 mg daily was last seen by cardiology on February 20, 2024.  At that time she noted that she would like to come off of flecainide therapy and inquired about options for other management.  Given her age and success rate of atrial flutter ablation, and atrial flutter being the only rhythm thus far documented in the EMR, she was referred to electrophysiology for evaluation for EPS and flutter ablation.     We discussed management options including continuing flecainide, versus EP study and ablation.  We went through the risk and benefits of ablation including but not limited to injury to the blood vessels in the groin resulting in bleeding/bruising/hematoma/fistulas, damage to the heart causing a hole in the heart requiring a drain or worst-case neurosurgical repair, damage to the native conduction system, stroke, heart attack, death, damage to esophagus, and damage to the phrenic nerve.  We discussed that while the rhythm looks like atypical flutter, this could be an alternate rhythm.  Given this, we will attempt to induce other rhythms at the time of her study.  This will mean that she will have to be awake for the initial portion.  We also discussed implantable loop recorder as she had atrial fibrillation listed in her history but none was documented.  We discussed the risk and benefits of implant of the loop recorder.  I discussed an option for 3-year monitor versus 6-year monitor, to which the patient replied she feels that 3 years would be adequate.     After discussion, the decision was made to move  "forward with loop recorder implant and EP study/ablation.  She presents today for that procedure.     Past Surgical History:   Procedure Laterality Date    BREAST LUMPECTOMY Right 07/28/2023    Procedure: LUMPECTOMY BREAST KRISTIAN LOCALIZED;  Surgeon: Viola Gaines MD;  [facility];  Service: Surgical Oncology    CARDIAC ELECTROPHYSIOLOGY STUDY AND ABLATION      per pt \"reports did not have this surgery\"    CATARACT EXTRACTION Bilateral     EYE SURGERY Left     retina surgery at [facility] eye    MOHS SURGERY Left 11/22/2022    BCC left interauricular ear-Dr Workman--per pt \"a mole removed not in ear-on scalp\"    OVARIAN CYST SURGERY Right 02/02/2018    Procedure: LAPAROSCOPIC PARAOVARIAN  CYSTECTOMY;  Surgeon: Harley Darling MD;  Location:  MAIN OR;  Service: Gynecology    CO HYSTEROSCOPY BX ENDOMETRIUM&/POLYPC W/WO D&C N/A 3/20/2024    Procedure: DILATATION & CURETTAGE W/ HYSTEROSCOPY , EXAM UNDER ANESTHESIA;  Surgeon: C William Riedel, DO;  [facility];  Service: Gynecology    SKIN BIOPSY      TONSILLECTOMY      and adenoids    US BREAST CLIP NEEDLE LOC RIGHT Right 07/07/2023    US GUIDED BREAST BIOPSY RIGHT COMPLETE Right 02/02/2023       Review of Systems    Objective     A complete review of systems was performed and was negative in 10/10 systems or as listed above.     Physical Exam  GEN: No acute distress, Alert and oriented, well appearing  HEENT: Normocephalic atraumatic  CARDIOVASCULAR:  RRR, No murmur, rub, gallops S1,S2  LUNGS: Clear To auscultation bilaterally, normal effort, no rales, rhonchi, crackles   ABDOMEN: Soft, nontender, nondistended  EXTREMITIES/VASCULAR:  No edema. warm an well perfused.  PSYCH: Normal Affect,  linear speech pattern without evidence of psychosis.   NEURO: Grossly intact, moving all extremiteis equal, face symmetric, alert and responsive, no obvious focal defecits   GAIT:  Ambulates normally without difficulty  HEME: No bleeding, bruising, petechia, purpura   SKIN: " No significant rashes on visibile skin, warm, no diaphoresis or pallor.    .SOC

## 2024-04-17 LAB
ATRIAL RATE: 62 BPM
ATRIAL RATE: 80 BPM
P AXIS: 17 DEGREES
P AXIS: 75 DEGREES
PR INTERVAL: 204 MS
PR INTERVAL: 208 MS
QRS AXIS: 64 DEGREES
QRS AXIS: 90 DEGREES
QRSD INTERVAL: 100 MS
QRSD INTERVAL: 92 MS
QT INTERVAL: 350 MS
QT INTERVAL: 430 MS
QTC INTERVAL: 404 MS
QTC INTERVAL: 436 MS
T WAVE AXIS: 51 DEGREES
T WAVE AXIS: 65 DEGREES
VENTRICULAR RATE: 62 BPM
VENTRICULAR RATE: 80 BPM

## 2024-04-17 PROCEDURE — 93010 ELECTROCARDIOGRAM REPORT: CPT | Performed by: INTERNAL MEDICINE

## 2024-04-30 ENCOUNTER — IN-CLINIC DEVICE VISIT (OUTPATIENT)
Dept: CARDIOLOGY CLINIC | Facility: CLINIC | Age: 44
End: 2024-04-30
Payer: COMMERCIAL

## 2024-04-30 DIAGNOSIS — Z95.818 PRESENCE OF CARDIAC DEVICE: Primary | ICD-10-CM

## 2024-04-30 PROCEDURE — 93291 INTERROG DEV EVAL SCRMS IP: CPT | Performed by: STUDENT IN AN ORGANIZED HEALTH CARE EDUCATION/TRAINING PROGRAM

## 2024-04-30 NOTE — PROGRESS NOTES
Results for orders placed or performed in visit on 04/30/24   Cardiac EP device report    Narrative    MDT LNQ22 ILR/ ACTIVE SYSTEM IS MRI CONDITIONAL  DEVICE INTERROGATED IN THE [place] OFFICE. BATTERY VOLTAGE ADEQUATE. NO PATIENT OR DEVICE ACTIVATED EPISODES. WOUND CHECK: INCISION CLEAN AND DRY WITH EDGES APPROXIMATED; WOUND CARE AND RESTRICTIONS REVIEWED WITH PATIENT.--suarez

## 2024-05-01 ENCOUNTER — TELEPHONE (OUTPATIENT)
Dept: HEMATOLOGY ONCOLOGY | Facility: CLINIC | Age: 44
End: 2024-05-01

## 2024-05-01 NOTE — TELEPHONE ENCOUNTER
I called Mary regarding an appointment that they have scheduled with Dr. Gaines scheduled on  5/13/24           Appointment Change  Cancel, Reschedule, Change to Virtual      Who are you speaking with? Left message for patient with new appointment time and date   If it is not the patient, is the caller listed on the communication consent form? N/A   Which provider is the appointment scheduled with? Dr. Gaines   When was the original appointment scheduled?    Please list date and time 5/13/24 2:00pm   At which location is the appointment scheduled to take place? [place]   Was the appointment rescheduled?     Was the appointment changed from an in person visit to a virtual visit?    If so, please list the details of the change. 5/30/24 3:30pm Es [place]    Advised patient to contact the [facility] if the new appointment time and date does not work   What is the reason for the appointment change? Provider in OR       Was STAR transport scheduled? No   Does STAR transport need to be scheduled for the new visit (if applicable) No   Does the patient need an infusion appointment rescheduled? No   Does the patient have an upcoming infusion appointment scheduled? If so, when? No   Is the patient undergoing chemotherapy? No   For appointments cancelled with less than 24 hours:  Was the no-show policy reviewed? N/A       Mary,     I am reaching out regarding your appointment with Dr. Gaines on  5/13/24 .    There has been a change to the providers schedule, and your appointment has been cancelled.  Your new apppointment is scheduled for Thursday 5/30/24 at 3:30pm with Es Wilson at the [place] office.     Please call the [facility] at [phone] and we would be happy to assist you with rescheduling.     Thank you,   [facility]  Oncology [facility]  [phone] ([phone])

## 2024-05-01 NOTE — LETTER
Mary,     I am reaching out regarding your appointment with Dr. Gaines on 5/13/24.    There has been a change to the providers schedule, and your appointment has been cancelled.  Your new apppointment is scheduled for Thursday 5/30/24 at 3:30pm with Es Wilson at the [place] office.     Please call the [facility] at [phone] and we would be happy to assist you with rescheduling.     Thank you,   [facility]  On[facility]  [phone])

## 2024-05-17 ENCOUNTER — TELEPHONE (OUTPATIENT)
Dept: CARDIOLOGY CLINIC | Facility: CLINIC | Age: 44
End: 2024-05-17

## 2024-05-17 NOTE — TELEPHONE ENCOUNTER
Cardiac clearance for patient's dental procedure has been completed and e-signed by Pee Weber MD    Called [facility] to get their fax number. Given office e-mail address.     Sent patient's cardiac clearance to [email or web address]

## 2024-05-21 DIAGNOSIS — I48.92 ATRIAL FLUTTER, UNSPECIFIED TYPE (HCC): ICD-10-CM

## 2024-05-21 DIAGNOSIS — E11.9 TYPE 2 DIABETES MELLITUS WITHOUT COMPLICATION, WITHOUT LONG-TERM CURRENT USE OF INSULIN (HCC): ICD-10-CM

## 2024-05-21 NOTE — TELEPHONE ENCOUNTER
Refill must be reviewed and completed by the office or provider. The refill is unable to be approved or denied by the medication management team.    Medication not assigned to a protocol, review manually.

## 2024-05-22 RX ORDER — ATORVASTATIN CALCIUM 20 MG/1
TABLET, FILM COATED ORAL
Qty: 90 TABLET | Refills: 2 | Status: SHIPPED | OUTPATIENT
Start: 2024-05-22

## 2024-05-22 RX ORDER — FLECAINIDE ACETATE 100 MG/1
100 TABLET ORAL 2 TIMES DAILY
Qty: 180 TABLET | Refills: 3 | Status: SHIPPED | OUTPATIENT
Start: 2024-05-22

## 2024-05-22 NOTE — PROGRESS NOTES
Electrophysiology Follow Up  [facility]  [facility] [address], [place] [address]    Name: Mary Arevalo  : 1980  MRN: [id number]    ASSESSMENT/PLAN:  Atrial flutter  S/p CTI line 2024 by Dr. Gus GENTILE plan Eliquis for 2 months post procedure  AAD: Flecanide-patient discontinued after procedure  Rate control: Metoprolol-patient discontinued after procedure  Loop recorder implanted  DM2  GERD  HLD    We discussed that in her chart there is a diagnosis of atrial fibrillation from her previous cardiologist, however in review of all available rhythm strips and EKGs there is no A-fib on the strips but only atrial flutter.  Discussed that regardless of not having a diagnosis at this current time given a history of atrial flutter she may go on to develop atrial fibrillation.  In the case of atrial fibrillation she has a [id number] score of 2 1 because she is female 1 for the diabetes and would have a moderate to high risk of stroke over the course of 1 year off of a blood thinner.  We discussed the idea of remaining on a blood thinner versus discontinuing after 2 months post procedure and restarting if she develops palpitations or her loop recorder demonstrates an indication.  After discussion with she was comfortable with the latter recommendation to discontinue it 2 months and monitor on the loop recorder/symptoms.     Discussed risk factor modification and she is working on weight loss and increasing her exercise      Patient has been instructed to follow up in our EP office in 4 months. She will call our office with any questions or concerns in the meantime.      CC/HPI:   Interim history: Mary Arevalo is a 43 y.o. female with above past medical history who presents today for aflutter ablation follow up.  Groins healed well.  She reports no episodes of breakthrough feelings like her previous hear beating fast right out of chest.  She reports having  "stopped the flecainide and metoprolol after the procedure.    EKG: Sinus rhythm    Review of Systems      OBJECTIVE:   Vitals:   /62 (BP Location: Left arm, Patient Position: Sitting, Cuff Size: Standard)   Pulse 64   Ht 5' 2\" (1.575 m)   Wt 77.6 kg (171 lb)   BMI 31.28 kg/m²   Body mass index is 31.28 kg/m².        Physical Exam:   Physical Exam  Constitutional:       General: She is not in acute distress.     Appearance: She is not ill-appearing.   HENT:      Mouth/Throat:      Mouth: Mucous membranes are moist.   Cardiovascular:      Rate and Rhythm: Normal rate and regular rhythm.      Heart sounds: No murmur heard.     No friction rub. No gallop.   Pulmonary:      Effort: Pulmonary effort is normal. No respiratory distress.      Breath sounds: No wheezing or rales.   Chest:      Chest wall: No tenderness.   Skin:     General: Skin is warm and dry.   Neurological:      Mental Status: She is alert.            Medications:      Current Outpatient Medications:     apixaban (Eliquis) 5 mg, Take 1 tablet (5 mg total) by mouth 2 (two) times a day, Disp: 60 tablet, Rfl: 3    atorvastatin (LIPITOR) 20 mg tablet, TAKE 1 TABLET BY MOUTH EVERY DAY, Disp: 90 tablet, Rfl: 2    dulaglutide (Trulicity) 0.75 MG/0.5ML injection, INJECT 0.5 ML (0.75 MG TOTAL) UNDER THE SKIN EVERY 7 DAYS, Disp: 2 mL, Rfl: 5    Empagliflozin (JARDIANCE) 10 MG TABS tablet, Take 1 tablet (10 mg total) by mouth daily, Disp: 30 tablet, Rfl: 5    famotidine (PEPCID) 20 mg tablet, Take 20 mg by mouth daily at bedtime as needed for heartburn, Disp: , Rfl:     fenofibrate (TRICOR) 54 MG tablet, Take 54 mg by mouth daily, Disp: , Rfl:     glucose blood (OneTouch Ultra) test strip, Test once daily, Disp: 100 each, Rfl: 3    ibuprofen (MOTRIN) 600 mg tablet, Take 1 tablet (600 mg total) by mouth every 6 (six) hours as needed for mild pain, Disp: 30 tablet, Rfl: 0    Lancets (OneTouch Delica Plus [id number]) MISC, TEST 1-2 TIMES A DAY, Disp: 100 each, " "Rfl: 3    medroxyPROGESTERone (PROVERA) 5 mg tablet, Take 1 tablet (5 mg total) by mouth daily, Disp: 30 tablet, Rfl: 1    Blood Glucose Monitoring Suppl (ONE TOUCH ULTRA 2) w/Device KIT, USE TO TEST BLOOD SUGAR (Patient taking differently: in the morning), Disp: 1 each, Rfl: 0    chlorhexidine (PERIDEX) 0.12 % solution, Apply to the mouth or throat 2 (two) times a day (Patient not taking: Reported on 5/23/2024), Disp: , Rfl:     flecainide (TAMBOCOR) 100 mg tablet, TAKE 1 TABLET BY MOUTH TWICE A DAY (Patient not taking: Reported on 5/23/2024), Disp: 180 tablet, Rfl: 3    metoprolol succinate (TOPROL-XL) 25 mg 24 hr tablet, Take 1 tablet (25 mg total) by mouth daily at bedtime (Patient not taking: Reported on 5/23/2024), Disp: 60 tablet, Rfl: 6       Historical Information   Past Medical History:   Diagnosis Date    Adnexal cyst     left \"fallopian tube\"    Anesthesia     per pt \"sometimes oxygen level drops very low\" not all the time -has occurred more than once under anesthesia\"    [facility]--per pt \"did not have ablation and takes flecanide and metoprolol for it\"    Basal cell carcinoma 09/28/2022    left interauricular ear    Dental crowns present     Diabetes mellitus (HCC)     type 2    Elevated LFTs 06/01/2020    GERD (gastroesophageal reflux disease)     GERD (gastroesophageal reflux disease)     Hyperlipidemia     Irregular heart beat     hx of A flutter per pt    Irregular periods     Melanoma (HCC) 2022    s/p [employer] loop recorder 4/16/2024 04/16/2024    s/p typical atrial flutter ablation 4/16/2024 04/16/2024    Varicella        Past Surgical History:   Procedure Laterality Date    BREAST LUMPECTOMY Right 07/28/2023    Procedure: LUMPECTOMY BREAST KRISTIAN LOCALIZED;  Surgeon: Viola Gaines MD;  [facility];  Service: Surgical Oncology    CARDIAC ELECTROPHYSIOLOGY PROCEDURE N/A 4/16/2024    Procedure: Cardiac eps/aflutter ablation;  Surgeon: Pee" "MD Gus;  [facility];  Service: Cardiology    CARDIAC ELECTROPHYSIOLOGY PROCEDURE N/A 2024    Procedure: Cardiac loop recorder implant;  Surgeon: Pee Weber MD;  [facility];  Service: Cardiology    CARDIAC ELECTROPHYSIOLOGY STUDY AND ABLATION      per pt \"reports did not have this surgery\"    CATARACT EXTRACTION Bilateral     EYE SURGERY Left     retina surgery at [facility] eye    MOHS SURGERY Left 2022    BCC left interauricular ear-Dr Workman--per pt \"a mole removed not in ear-on scalp\"    OVARIAN CYST SURGERY Right 2018    Procedure: LAPAROSCOPIC PARAOVARIAN  CYSTECTOMY;  Surgeon: Harley Darling MD;  [facility];  Service: Gynecology    [place] HYSTEROSCOPY BX ENDOMETRIUM&/POLYPC W/WO D&C N/A 3/20/2024    Procedure: DILATATION & CURETTAGE W/ HYSTEROSCOPY , EXAM UNDER ANESTHESIA;  Surgeon: C William Riedel, DO;  [facility];  Service: Gynecology    SKIN BIOPSY      TONSILLECTOMY      and adenoids    US BREAST CLIP NEEDLE LOC RIGHT Right 2023    US GUIDED BREAST BIOPSY RIGHT COMPLETE Right 2023       Social History     Substance and Sexual Activity   Alcohol Use Not Currently    Comment: rare--3x/year     Social History     Substance and Sexual Activity   Drug Use No    Comment: Denies any drug use per pt     Social History     Tobacco Use   Smoking Status Former    Current packs/day: 0.00    Average packs/day: 1.5 packs/day for 16.0 years (24.0 ttl pk-yrs)    Types: Cigarettes    Start date:     Quit date: 2012    Years since quittin.4   Smokeless Tobacco Never   Tobacco Comments    Former smoker - quit in  -       Family History   Problem Relation Age of Onset    No Known Problems Mother     Heart disease Father     Hypertension Maternal Grandmother     Bone cancer Maternal Grandmother 93    No Known Problems Maternal Grandfather     No Known Problems Paternal Grandmother     No Known Problems Paternal Grandfather     " Colon cancer Maternal Uncle 62    Ovarian cancer Paternal Aunt 56    Heart disease Family     Cancer Family     Thyroid disease Family     Substance Abuse Neg Hx     Mental illness Neg Hx     Anesthesia problems Neg Hx     BRCA2 Positive Neg Hx     BRCA2 Negative Neg Hx     BRCA1 Negative Neg Hx     BRCA1 Positive Neg Hx     BRCA 1/2 Neg Hx     Endometrial cancer Neg Hx     Breast cancer additional onset Neg Hx     Breast cancer Neg Hx          Labs & Results:  Below is the patient's most recent value for Albumin, ALT, AST, BUN, Calcium, Chloride, Cholesterol, CO2, Creatinine, GFR, Glucose, HDL, Hematocrit, Hemoglobin, Hemoglobin A1C, LDL, Magnesium, Phosphorus, Platelets, Potassium, PSA, Sodium, Triglycerides, and WBC.   Lab Results   Component Value Date    ALT 35 (H) 04/05/2024    AST 26 04/05/2024    BUN 13 04/16/2024    CALCIUM 9.0 04/16/2024     04/16/2024    CO2 29 04/16/2024    CREATININE 0.57 (L) 04/16/2024    HDL 30 (L) 03/14/2024    HCT 51.5 (H) 04/16/2024    HGB 15.6 (H) 04/16/2024    HGBA1C 6.6 (H) 03/14/2024     04/16/2024    K 4.1 04/16/2024    TRIG 315 (H) 03/14/2024    WBC 7.04 04/16/2024     Note: for a comprehensive list of the patient's lab results, access the Results Review activity.

## 2024-05-23 ENCOUNTER — OFFICE VISIT (OUTPATIENT)
Dept: CARDIOLOGY CLINIC | Facility: CLINIC | Age: 44
End: 2024-05-23
Payer: COMMERCIAL

## 2024-05-23 VITALS
HEIGHT: 62 IN | SYSTOLIC BLOOD PRESSURE: 106 MMHG | WEIGHT: 171 LBS | HEART RATE: 64 BPM | DIASTOLIC BLOOD PRESSURE: 62 MMHG | BODY MASS INDEX: 31.47 KG/M2

## 2024-05-23 DIAGNOSIS — I48.0 PAROXYSMAL ATRIAL FIBRILLATION (HCC): Primary | ICD-10-CM

## 2024-05-23 DIAGNOSIS — I48.92 ATRIAL FLUTTER, UNSPECIFIED TYPE (HCC): ICD-10-CM

## 2024-05-23 PROCEDURE — 93000 ELECTROCARDIOGRAM COMPLETE: CPT | Performed by: PHYSICIAN ASSISTANT

## 2024-05-23 PROCEDURE — 99214 OFFICE O/P EST MOD 30 MIN: CPT | Performed by: PHYSICIAN ASSISTANT

## 2024-05-28 DIAGNOSIS — R93.89 THICKENED ENDOMETRIUM: ICD-10-CM

## 2024-05-28 DIAGNOSIS — N85.02 ENDOMETRIAL HYPERPLASIA WITH ATYPIA: ICD-10-CM

## 2024-05-28 RX ORDER — MEDROXYPROGESTERONE ACETATE 5 MG/1
5 TABLET ORAL DAILY
Qty: 90 TABLET | Refills: 1 | Status: SHIPPED | OUTPATIENT
Start: 2024-05-28 | End: 2024-07-27

## 2024-05-30 ENCOUNTER — OFFICE VISIT (OUTPATIENT)
Dept: SURGICAL ONCOLOGY | Facility: CLINIC | Age: 44
End: 2024-05-30
Payer: COMMERCIAL

## 2024-05-30 VITALS
DIASTOLIC BLOOD PRESSURE: 78 MMHG | HEIGHT: 62 IN | WEIGHT: 171 LBS | RESPIRATION RATE: 16 BRPM | HEART RATE: 68 BPM | BODY MASS INDEX: 31.47 KG/M2 | SYSTOLIC BLOOD PRESSURE: 116 MMHG | TEMPERATURE: 98 F | OXYGEN SATURATION: 94 %

## 2024-05-30 DIAGNOSIS — N64.89 RADIAL SCAR OF RIGHT BREAST: Primary | ICD-10-CM

## 2024-05-30 PROCEDURE — 99213 OFFICE O/P EST LOW 20 MIN: CPT

## 2024-05-30 NOTE — PROGRESS NOTES
Surgical Oncology Follow Up       [address]  [facility]  [address] [facility] [address]    Mary Arevalo  1980  [id number]  [address]  [facility]COL[facility]Y [facility]  [address] [place] [address]    Chief Complaint   Patient presents with   • Follow-up       Assessment/Plan:  1. Radial scar of right breast  - PRN   - annual mammogram        Discussion/Summary: Patient is a 43 year old female presenting for a f/u s/p lumpectomy for radial scar of the right breast in July 2023. Surgical pathology revealed benign breast tissue with ectatic ducts and focal periductal chronic inflammation. She had a b/l dx mammogram on 3/12/24 which was BIRADS 2 category 2 density. There were no concerns on her cbe. Patient denies changes on her breast exam. She was instructed to call with any questions or concerns in the future. All questions were answered today.        History of Present Illness:     Oncology History    No history exists.        -Interval History: Patient is a 43 year old female presenting for a f/u s/p lumpectomy for radial scar of the right breast in July 2023. She is on obs. She had a b/l dx mammogram on 3/12/24 which was BIRADS 2 category 2 density. Patient denies changes on her breast exam.       Review of Systems:  Review of Systems   Constitutional:  Negative for activity change, appetite change, fatigue and unexpected weight change.   Respiratory:  Negative for cough and shortness of breath.    Cardiovascular:  Negative for chest pain.   Gastrointestinal:  Negative for abdominal pain, diarrhea, nausea and vomiting.   Endocrine: Negative for heat intolerance.   Musculoskeletal:  Negative for arthralgias, back pain and myalgias.   Skin:  Negative for rash.   Neurological:  Negative for weakness and headaches.   Hematological:  Negative for adenopathy.       Patient Active Problem List   Diagnosis   • Female pelvic  "pain   • Type 2 diabetes mellitus without complication, without long-term current use of insulin (HCC)   • Hypertriglyceridemia   • Hyperlipidemia associated with type 2 diabetes mellitus  (HCC)   • Paroxysmal atrial fibrillation (HCC)   • Radial scar of right breast   • GERD (gastroesophageal reflux disease)   • S/P bilateral cataract extraction   • Irregular menstrual bleeding   • Hydrosalpinx   • Thickened endometrium   • Fibroids, subserous   • Paroxysmal atrial flutter (HCC)   • s/p typical atrial flutter ablation 4/16/2024   • s/p Medtronic loop recorder 4/16/2024     Past Medical History:   Diagnosis Date   • Adnexal cyst     left \"fallopian tube\"   • Anesthesia     per pt \"sometimes oxygen level drops very low\" not all the time -has occurred more than once under anesthesia\"   • Atrial fibrillation (HCC)     [facility] Dr SUZANNA Parkinson--per pt \"did not have ablation and takes flecanide and metoprolol for it\"   • Basal cell carcinoma 09/28/2022    left interauricular ear   • Dental crowns present    • Diabetes mellitus (HCC)     type 2   • Elevated LFTs 06/01/2020   • GERD (gastroesophageal reflux disease)    • GERD (gastroesophageal reflux disease)    • Hyperlipidemia    • Irregular heart beat     hx of A flutter per pt   • Irregular periods    • Melanoma (HCC) 2022   • s/p Medtronic loop recorder 4/16/2024 04/16/2024   • s/p typical atrial flutter ablation 4/16/2024 04/16/2024   • Varicella      Past Surgical History:   Procedure Laterality Date   • BREAST LUMPECTOMY Right 07/28/2023    Procedure: LUMPECTOMY BREAST KRISTIAN LOCALIZED;  Surgeon: Viola Gaines MD;  Location: [facility];  Service: Surgical Oncology   • CARDIAC ELECTROPHYSIOLOGY PROCEDURE N/A 4/16/2024    Procedure: Cardiac eps/aflutter ablation;  Surgeon: Pee Weber MD;  [facility];  Service: Cardiology   • CARDIAC ELECTROPHYSIOLOGY PROCEDURE N/A 4/16/2024    Procedure: Cardiac loop recorder implant;  Surgeon: Pee Weber MD;  " "Location: [facility];  Service: Cardiology   • CARDIAC ELECTROPHYSIOLOGY STUDY AND ABLATION      per pt \"reports did not have this surgery\"   • CATARACT EXTRACTION Bilateral    • EYE SURGERY Left     retina surgery at [facility]   • MOHS SURGERY Left 11/22/2022    [facility] left interauricular ear-Dr Workman--per pt \"a mole removed not in ear-on scalp\"   • OVARIAN CYST SURGERY Right 02/02/2018    Procedure: LAPAROSCOPIC PARAOVARIAN  CYSTECTOMY;  Surgeon: Harley Darling MD;  Location:  MAIN OR;  Service: Gynecology   • MI HYSTEROSCOPY BX ENDOMETRIUM&/POLYPC W/WO D&C N/A 3/20/2024    Procedure: DILATATION & CURETTAGE W/ HYSTEROSCOPY , EXAM UNDER ANESTHESIA;  Surgeon: C William Riedel, DO;  Location: [facility];  Service: Gynecology   • SKIN BIOPSY     • TONSILLECTOMY      and adenoids   • US BREAST CLIP NEEDLE LOC RIGHT Right 07/07/2023   • US GUIDED BREAST BIOPSY RIGHT COMPLETE Right 02/02/2023     Family History   Problem Relation Age of Onset   • No Known Problems Mother    • Heart disease Father    • Hypertension Maternal Grandmother    • Bone cancer Maternal Grandmother 93   • No Known Problems Maternal Grandfather    • No Known Problems Paternal Grandmother    • No Known Problems Paternal Grandfather    • Colon cancer Maternal Uncle 62   • Ovarian cancer Paternal Aunt 56   • Heart disease Family    • Cancer Family    • Thyroid disease Family    • Substance Abuse Neg Hx    • Mental illness Neg Hx    • Anesthesia problems Neg Hx    • BRCA2 Positive Neg Hx    • BRCA2 Negative Neg Hx    • BRCA1 Negative Neg Hx    • BRCA1 Positive Neg Hx    • BRCA 1/2 Neg Hx    • Endometrial cancer Neg Hx    • Breast cancer additional onset Neg Hx    • Breast cancer Neg Hx      Social History     Socioeconomic History   • Marital status: /Civil Union     Spouse name: Elliott   • Number of children: 0   • Years of education: Not on file   • Highest education level: Not on file   Occupational History   • Occupation: House "    Tobacco Use   • Smoking status: Former     Current packs/day: 0.00     Average packs/day: 1.5 packs/day for 16.0 years (24.0 ttl pk-yrs)     Types: Cigarettes     Start date:      Quit date: 2012     Years since quittin.4   • Smokeless tobacco: Never   • Tobacco comments:     Former smoker - quit in 2012 -   Vaping Use   • Vaping status: Never Used   Substance and Sexual Activity   • Alcohol use: Not Currently     Comment: rare--3x/year   • Drug use: No     Comment: Denies any drug use per pt   • Sexual activity: Yes     Partners: Male     Birth control/protection: None     Comment: Denies any chest pain or shortness of breath with activity   Other Topics Concern   • Not on file   Social History Narrative    Always uses seatbelt    Caffeine use     Social Determinants of Health     Financial Resource Strain: Not on file   Food Insecurity: Not on file   Transportation Needs: Not on file   Physical Activity: Not on file   Stress: Not on file   Social Connections: Not on file   Intimate Partner Violence: Not on file   Housing Stability: Not on file       Current Outpatient Medications:   •  apixaban (Eliquis) 5 mg, Take 1 tablet (5 mg total) by mouth 2 (two) times a day, Disp: 60 tablet, Rfl: 3  •  atorvastatin (LIPITOR) 20 mg tablet, TAKE 1 TABLET BY MOUTH EVERY DAY, Disp: 90 tablet, Rfl: 2  •  Blood Glucose Monitoring Suppl (ONE TOUCH ULTRA 2) w/Device KIT, USE TO TEST BLOOD SUGAR (Patient taking differently: in the morning), Disp: 1 each, Rfl: 0  •  dulaglutide (Trulicity) 0.75 MG/0.5ML injection, INJECT 0.5 ML (0.75 MG TOTAL) UNDER THE SKIN EVERY 7 DAYS, Disp: 2 mL, Rfl: 5  •  Empagliflozin (JARDIANCE) 10 MG TABS tablet, Take 1 tablet (10 mg total) by mouth daily, Disp: 30 tablet, Rfl: 5  •  famotidine (PEPCID) 20 mg tablet, Take 20 mg by mouth daily at bedtime as needed for heartburn, Disp: , Rfl:   •  fenofibrate (TRICOR) 54 MG tablet, Take 54 mg by mouth daily, Disp: , Rfl:   •  glucose  blood (OneTouch Ultra) test strip, Test once daily, Disp: 100 each, Rfl: 3  •  ibuprofen (MOTRIN) 600 mg tablet, Take 1 tablet (600 mg total) by mouth every 6 (six) hours as needed for mild pain, Disp: 30 tablet, Rfl: 0  •  Lancets (OneTouch Delica Plus [id number]) MISC, TEST 1-2 TIMES A DAY, Disp: 100 each, Rfl: 3  •  medroxyPROGESTERone (PROVERA) 5 mg tablet, TAKE 1 TABLET (5 MG TOTAL) BY MOUTH DAILY., Disp: 90 tablet, Rfl: 1  •  chlorhexidine (PERIDEX) 0.12 % solution, Apply to the mouth or throat 2 (two) times a day (Patient not taking: Reported on 5/23/2024), Disp: , Rfl:   •  flecainide (TAMBOCOR) 100 mg tablet, TAKE 1 TABLET BY MOUTH TWICE A DAY (Patient not taking: Reported on 5/23/2024), Disp: 180 tablet, Rfl: 3  •  metoprolol succinate (TOPROL-XL) 25 mg 24 hr tablet, Take 1 tablet (25 mg total) by mouth daily at bedtime (Patient not taking: Reported on 5/23/2024), Disp: 60 tablet, Rfl: 6  No Known Allergies  Vitals:    05/30/24 1425   BP: 116/78   Pulse: 68   Resp: 16   Temp: 98 °F (36.7 °C)   SpO2: 94%       Physical Exam  Constitutional:       General: She is not in acute distress.     Appearance: Normal appearance.   Cardiovascular:      Rate and Rhythm: Normal rate and regular rhythm.      Pulses: Normal pulses.      Heart sounds: Normal heart sounds.   Pulmonary:      Effort: Pulmonary effort is normal.      Breath sounds: Normal breath sounds.   Chest:      Chest wall: No mass.   Breasts:     Right: No swelling, bleeding, inverted nipple, mass, nipple discharge, skin change or tenderness.      Left: No swelling, bleeding, inverted nipple, mass, nipple discharge, skin change or tenderness.       Abdominal:      General: Abdomen is flat.      Palpations: Abdomen is soft.   Lymphadenopathy:      Upper Body:      Right upper body: No supraclavicular, axillary or pectoral adenopathy.      Left upper body: No supraclavicular, axillary or pectoral adenopathy.   Skin:     General: Skin is warm.    Neurological:      General: No focal deficit present.      Mental Status: She is alert and oriented to person, place, and time.   Psychiatric:         Mood and Affect: Mood normal.         Behavior: Behavior normal.           Results:    Imaging  Cardiac EP device report    Result Date: 4/30/2024  Narrative: MDT LNQ22 ILR/ ACTIVE SYSTEM IS MRI CONDITIONAL NON-BILLABLE. CARELINK TRANSMISSION: ALERT/ SYMPTOM. PER PT: Short of breath  A little active  EGRAM SHOWS SR. DL       I reviewed the above imaging data.      Advance Care Planning/Advance Directives:  Discussed disease status, cancer treatment plans and/or cancer treatment goals with the patient.

## 2024-06-04 ENCOUNTER — OFFICE VISIT (OUTPATIENT)
Dept: GYNECOLOGY | Facility: CLINIC | Age: 44
End: 2024-06-04
Payer: COMMERCIAL

## 2024-06-04 VITALS — DIASTOLIC BLOOD PRESSURE: 80 MMHG | SYSTOLIC BLOOD PRESSURE: 110 MMHG | BODY MASS INDEX: 30.67 KG/M2 | WEIGHT: 167.7 LBS

## 2024-06-04 DIAGNOSIS — N76.4 VULVAR ABSCESS: Primary | ICD-10-CM

## 2024-06-04 DIAGNOSIS — N85.00 ENDOMETRIAL HYPERPLASIA: ICD-10-CM

## 2024-06-04 DIAGNOSIS — N70.11 HYDROSALPINX: ICD-10-CM

## 2024-06-04 DIAGNOSIS — L73.9 FOLLICULITIS: ICD-10-CM

## 2024-06-04 DIAGNOSIS — D25.2 FIBROIDS, SUBSEROUS: ICD-10-CM

## 2024-06-04 PROCEDURE — 99214 OFFICE O/P EST MOD 30 MIN: CPT | Performed by: OBSTETRICS & GYNECOLOGY

## 2024-06-04 RX ORDER — CEPHALEXIN 500 MG/1
500 CAPSULE ORAL EVERY 12 HOURS SCHEDULED
Qty: 10 CAPSULE | Refills: 1 | Status: SHIPPED | OUTPATIENT
Start: 2024-06-04 | End: 2024-06-09

## 2024-06-04 NOTE — PROGRESS NOTES
Assessment/Plan:    Diagnoses and all orders for this visit:    Vulvar abscess  -     cephalexin (KEFLEX) 500 mg capsule; Take 1 capsule (500 mg total) by mouth every 12 (twelve) hours for 5 days  -     Genital Comprehensive Culture    Fibroids, subserous    Endometrial hyperplasia    Folliculitis  -     cephalexin (KEFLEX) 500 mg capsule; Take 1 capsule (500 mg total) by mouth every 12 (twelve) hours for 5 days    Hydrosalpinx        Subjective: Vulvar abscess/folliculitis     Patient ID: Mary Arevalo is a 43 y.o. female.    HPI  43-year-old female, nulliparous, with known uterine fibroids and D&C hysteroscopy within the past 2 months.  Did have a history of endometrial hyperplasia and was started on Provera 5 mg 1 daily.  She is not having any further bleeding issues since her D&C.  Today she presents with a vulvar abscess/ folliculitis on left vulvar area.  When palpating this abscess it burst and spontaneously drained foul-smelling serosanguineous drainage.  Culture was obtained.  The wound was probed with silver nitrate additional pus was extruded.  Edges were cauterized with silver nitrate.  Patient recommended to warm compresses apply twice daily washcloth as warm she can stand it tub soaks at least twice a day.  Try to keep it open is much as possible.  I gave her prescription for cephalexin 500 mg twice daily x 5 days.  She will follow-up in 2 weeks.  She is scheduled for a follow-up ultrasound in early July.    Review of Systems unchanged    Objective: No acute distress  /80   Wt 76.1 kg (167 lb 11.2 oz)   BMI 30.67 kg/m²      Physical Exam  Vitals and nursing note reviewed. Exam conducted with a chaperone present.   Constitutional:       Appearance: Normal appearance.   HENT:      Head: Normocephalic.   Eyes:      Pupils: Pupils are equal, round, and reactive to light.   Pulmonary:      Effort: Pulmonary effort is normal.   Abdominal:      General: Abdomen is flat.      Palpations: Abdomen is  soft.   Genitourinary:     Comments: Bimanual exam not performed today.  2 mm pimple abscess on the hairline left vulvar area.  When palpating erupted with serosanguineous foul-smelling discharge.  Culture was obtained of the pus.    Further expression allow more pus to come out.  I did probe with a silver nitrate and cauterized the edges.  Recommend the patient apply warm compresses as noted above.  Musculoskeletal:         General: Normal range of motion.      Cervical back: Normal range of motion.   Skin:     General: Skin is warm and dry.   Neurological:      Mental Status: She is alert and oriented to person, place, and time.   Psychiatric:         Mood and Affect: Mood normal.         Behavior: Behavior normal.         Thought Content: Thought content normal.         Judgment: Judgment normal.          Please note    In addition to the time spent discussing the findings and results of today's visit and exam, I spent approximately 30 minutes of face-to-face time with the patient, greater than 50% of which was spent in counseling and coordination of care for this patient.

## 2024-06-11 LAB
BACTERIA GENITAL AEROBE CULT: NORMAL
Lab: NORMAL

## 2024-06-20 ENCOUNTER — OFFICE VISIT (OUTPATIENT)
Dept: GYNECOLOGY | Facility: CLINIC | Age: 44
End: 2024-06-20
Payer: COMMERCIAL

## 2024-06-20 VITALS
WEIGHT: 167.8 LBS | HEIGHT: 62 IN | DIASTOLIC BLOOD PRESSURE: 80 MMHG | BODY MASS INDEX: 30.88 KG/M2 | SYSTOLIC BLOOD PRESSURE: 112 MMHG

## 2024-06-20 DIAGNOSIS — R93.89 THICKENED ENDOMETRIUM: ICD-10-CM

## 2024-06-20 DIAGNOSIS — N85.02 ENDOMETRIAL HYPERPLASIA WITH ATYPIA: Primary | ICD-10-CM

## 2024-06-20 DIAGNOSIS — I48.92 PAROXYSMAL ATRIAL FLUTTER (HCC): ICD-10-CM

## 2024-06-20 DIAGNOSIS — D25.2 FIBROIDS, SUBSEROUS: ICD-10-CM

## 2024-06-20 PROCEDURE — 99214 OFFICE O/P EST MOD 30 MIN: CPT | Performed by: OBSTETRICS & GYNECOLOGY

## 2024-06-20 NOTE — PROGRESS NOTES
"Assessment/Plan:    Diagnoses and all orders for this visit:    Endometrial hyperplasia with atypia    Fibroids, subserous    Paroxysmal atrial flutter (HCC)    Thickened endometrium        Subjective: Here for follow-up     Patient ID: Mary Arevalo is a 43 y.o. female.    HPI  43-year-old female, nulliparous with history of irregular menstrual bleeding.  Pelvic ultrasound was done and she was identified to have subserosal fibroids and uterine endometrial thickening.  Endometrial biopsy revealed complex endometrial hyperplasia with focal atypia with positive changes in a background of proliferative endometrium.  Ultimately she had a D&C hysteroscopy which revealed benign tissue no hyperplasia was identified.  She has been on Provera 5 mg 1 daily.  She no longer has menstrual periods.  She does not have hot flashes or night sweats.  She did develop an abscess in her left groin treated with Keflex.  Still slightly indurated but much improved.  She does have a follow-up pelvic ultrasound scheduled for next month she will return at that time to reassess and evaluate findings.  All questions answered    Review of Systems no new changes    Objective: No acute distress  /80 (BP Location: Left arm, Patient Position: Sitting, Cuff Size: Standard)   Ht 5' 2\" (1.575 m)   Wt 76.1 kg (167 lb 12.8 oz)   BMI 30.69 kg/m²      Physical Exam  Vitals and nursing note reviewed. Exam conducted with a chaperone present.   Constitutional:       Appearance: Normal appearance. She is obese.   HENT:      Head: Normocephalic.   Eyes:      Pupils: Pupils are equal, round, and reactive to light.   Pulmonary:      Effort: Pulmonary effort is normal.   Abdominal:      General: Abdomen is flat.      Palpations: Abdomen is soft.   Genitourinary:     General: Normal vulva.      Comments: Normal external genitalia  Normal size uterus  Normal cervix  No CMT  No pelvic masses  Left groin abscess healing spontaneously and slightly indurated " clear discharge noted  Musculoskeletal:         General: Normal range of motion.   Skin:     General: Skin is warm.   Neurological:      Mental Status: She is alert and oriented to person, place, and time.   Psychiatric:         Mood and Affect: Mood normal.         Behavior: Behavior normal.         Thought Content: Thought content normal.         Judgment: Judgment normal.        Please note    In addition to the time spent discussing the findings and results of today's visit and exam, I spent approximately 30 minutes of face-to-face time with the patient, greater than 50% of which was spent in counseling and coordination of care for this patient.

## 2024-06-25 ENCOUNTER — OFFICE VISIT (OUTPATIENT)
Dept: FAMILY MEDICINE CLINIC | Facility: HOSPITAL | Age: 44
End: 2024-06-25
Payer: COMMERCIAL

## 2024-06-25 VITALS
WEIGHT: 167 LBS | DIASTOLIC BLOOD PRESSURE: 72 MMHG | SYSTOLIC BLOOD PRESSURE: 110 MMHG | BODY MASS INDEX: 30.73 KG/M2 | HEART RATE: 77 BPM | HEIGHT: 62 IN | OXYGEN SATURATION: 94 %

## 2024-06-25 DIAGNOSIS — E11.9 TYPE 2 DIABETES MELLITUS WITHOUT COMPLICATION, WITHOUT LONG-TERM CURRENT USE OF INSULIN (HCC): ICD-10-CM

## 2024-06-25 DIAGNOSIS — I48.92 PAROXYSMAL ATRIAL FLUTTER (HCC): Primary | ICD-10-CM

## 2024-06-25 DIAGNOSIS — I48.0 PAROXYSMAL ATRIAL FIBRILLATION (HCC): ICD-10-CM

## 2024-06-25 DIAGNOSIS — E11.69 HYPERLIPIDEMIA ASSOCIATED WITH TYPE 2 DIABETES MELLITUS  (HCC): ICD-10-CM

## 2024-06-25 DIAGNOSIS — E78.1 HYPERTRIGLYCERIDEMIA: ICD-10-CM

## 2024-06-25 DIAGNOSIS — E78.5 HYPERLIPIDEMIA ASSOCIATED WITH TYPE 2 DIABETES MELLITUS  (HCC): ICD-10-CM

## 2024-06-25 PROCEDURE — 99214 OFFICE O/P EST MOD 30 MIN: CPT | Performed by: INTERNAL MEDICINE

## 2024-06-25 NOTE — PROGRESS NOTES
Assessment/Plan:     Diagnosis ICD-10-CM Associated Orders   1. Paroxysmal atrial flutter (HCC)  I48.92       2. Hyperlipidemia associated with type 2 diabetes mellitus  (HCC)  E11.69     E78.5       3. Type 2 diabetes mellitus without complication, without long-term current use of insulin (HCC)  E11.9       4. Paroxysmal atrial fibrillation (HCC)  I48.0       5. Hypertriglyceridemia  E78.1           Problem List Items Addressed This Visit        Cardiovascular and Mediastinum    Paroxysmal atrial fibrillation (HCC)     Had an ablation with Dr. Weber on April 16   Also has a loop recorder in place  Has appt to follow next month         Paroxysmal atrial flutter (HCC) - Primary     No recurrent symptoms since ablation            Endocrine    Hyperlipidemia associated with type 2 diabetes mellitus  (HCC)       Lab Results   Component Value Date    HGBA1C 6.6 (H) 03/14/2024   Home readings are am - under 130  Later in day goes up 150 then down about 30 lbs after she eats         Type 2 diabetes mellitus without complication, without long-term current use of insulin (HCC)       Lab Results   Component Value Date    HGBA1C 6.6 (H) 03/14/2024   Do repeat labs in next 2 weeks   Now on trulicity 0.75 with endocrinology            Other    Hypertriglyceridemia         No follow-ups on file.      Subjective:    Patient ID: Mary Arevalo is a 43 y.o. female    Here for follow up  1  cardiac- seen By Dr. Weber after ablation- doing well  2. Anticoagulation -on eliquis  3. DM type 2- will need to redo labs this month-is on trulicity with increased dosing        The following portions of the patient's history were reviewed and updated as appropriate: allergies, current medications and problem list.     Review of Systems   Constitutional:  Negative for fever.   HENT:  Negative for congestion.    Respiratory:  Negative for shortness of breath.    Cardiovascular:  Negative for chest pain and palpitations.   Gastrointestinal:   "Negative for abdominal pain.   All other systems reviewed and are negative.        Objective:      Current Outpatient Medications:   •  apixaban (Eliquis) 5 mg, Take 1 tablet (5 mg total) by mouth 2 (two) times a day, Disp: 60 tablet, Rfl: 3  •  atorvastatin (LIPITOR) 20 mg tablet, TAKE 1 TABLET BY MOUTH EVERY DAY, Disp: 90 tablet, Rfl: 2  •  dulaglutide (Trulicity) 0.75 MG/0.5ML injection, INJECT 0.5 ML (0.75 MG TOTAL) UNDER THE SKIN EVERY 7 DAYS, Disp: 2 mL, Rfl: 5  •  Empagliflozin (JARDIANCE) 10 MG TABS tablet, Take 1 tablet (10 mg total) by mouth daily, Disp: 30 tablet, Rfl: 5  •  famotidine (PEPCID) 20 mg tablet, Take 20 mg by mouth daily at bedtime as needed for heartburn, Disp: , Rfl:   •  fenofibrate (TRICOR) 54 MG tablet, Take 54 mg by mouth daily, Disp: , Rfl:   •  glucose blood (OneTouch Ultra) test strip, Test once daily, Disp: 100 each, Rfl: 3  •  ibuprofen (MOTRIN) 600 mg tablet, Take 1 tablet (600 mg total) by mouth every 6 (six) hours as needed for mild pain, Disp: 30 tablet, Rfl: 0  •  Lancets (OneTouch Delica Plus [id number]) MISC, TEST 1-2 TIMES A DAY, Disp: 100 each, Rfl: 3  •  medroxyPROGESTERone (PROVERA) 5 mg tablet, TAKE 1 TABLET (5 MG TOTAL) BY MOUTH DAILY., Disp: 90 tablet, Rfl: 1  •  Blood Glucose Monitoring Suppl (ONE TOUCH ULTRA 2) w/Device KIT, USE TO TEST BLOOD SUGAR (Patient not taking: Reported on 6/25/2024), Disp: 1 each, Rfl: 0  •  chlorhexidine (PERIDEX) 0.12 % solution, Apply to the mouth or throat 2 (two) times a day, Disp: , Rfl:     Blood pressure 110/72, pulse 77, height 5' 2\" (1.575 m), weight 75.8 kg (167 lb), SpO2 94%, not currently breastfeeding.     Physical Exam  Vitals and nursing note reviewed.   Constitutional:       General: She is not in acute distress.  HENT:      Head: Normocephalic.      Right Ear: There is no impacted cerumen.      Left Ear: There is no impacted cerumen.      Nose: No congestion.      Mouth/Throat:      Pharynx: No posterior oropharyngeal " erythema.   Eyes:      General:         Right eye: No discharge.         Left eye: No discharge.   Cardiovascular:      Rate and Rhythm: Normal rate and regular rhythm.      Pulses: no weak pulses.           Dorsalis pedis pulses are 2+ on the right side and 1+ on the left side.        Posterior tibial pulses are 2+ on the right side and 2+ on the left side.      Heart sounds: No murmur heard.  Pulmonary:      Breath sounds: No rhonchi.   Abdominal:      Palpations: Abdomen is soft.      Tenderness: There is no abdominal tenderness.   Musculoskeletal:         General: No tenderness.      Cervical back: No rigidity.   Feet:      Right foot:      Skin integrity: No ulcer, skin breakdown, erythema, warmth, callus or dry skin.   Lymphadenopathy:      Cervical: No cervical adenopathy.   Skin:     Findings: No rash.   Neurological:      Mental Status: She is alert and oriented to person, place, and time.   Psychiatric:         Mood and Affect: Mood normal.         Thought Content: Thought content normal.         Judgment: Judgment normal.      Patient's shoes and socks removed.    Right Foot/Ankle   Right Foot Inspection  Skin Exam: skin normal and skin intact. No dry skin, no warmth, no callus, no erythema, no maceration, no abnormal color, no pre-ulcer, no ulcer and no callus.     Toe Exam: No swelling and no tenderness    Sensory   Proprioception: intact  Monofilament testing: intact    Vascular  Capillary refills: < 3 seconds  The right DP pulse is 2+. The right PT pulse is 2+.     Left Foot/Ankle  Left Foot Inspection      Sensory   Monofilament testing: intact    Vascular  Capillary refills: < 3 seconds  The left DP pulse is 1+. The left PT pulse is 2+.     Assign Risk Category  No deformity present  No loss of protective sensation  No weak pulses  Risk: 0

## 2024-06-25 NOTE — ASSESSMENT & PLAN NOTE
Had an ablation with Dr. Weber on April 16   Also has a loop recorder in place  Has appt to follow next month

## 2024-06-25 NOTE — ASSESSMENT & PLAN NOTE
Lab Results   Component Value Date    HGBA1C 6.6 (H) 03/14/2024   Home readings are am - under 130  Later in day goes up 150 then down about 30 lbs after she eats

## 2024-06-25 NOTE — ASSESSMENT & PLAN NOTE
Lab Results   Component Value Date    HGBA1C 6.6 (H) 03/14/2024   Do repeat labs in next 2 weeks   Now on trulicity 0.75 with endocrinology

## 2024-07-15 DIAGNOSIS — E11.9 TYPE 2 DIABETES MELLITUS WITHOUT COMPLICATION, WITHOUT LONG-TERM CURRENT USE OF INSULIN (HCC): ICD-10-CM

## 2024-07-16 RX ORDER — EMPAGLIFLOZIN 10 MG/1
10 TABLET, FILM COATED ORAL DAILY
Qty: 30 TABLET | Refills: 5 | Status: SHIPPED | OUTPATIENT
Start: 2024-07-16

## 2024-08-16 ENCOUNTER — TELEPHONE (OUTPATIENT)
Age: 44
End: 2024-08-16

## 2024-08-16 NOTE — TELEPHONE ENCOUNTER
Pt calling to schedule physical as a requirement for new health ins... advised pt she has completed a physical within the past year... (3/21/2024) please call pt back with next steps to complete physical even if already completed with 365 days. Thank you.

## 2024-08-16 NOTE — TELEPHONE ENCOUNTER
Patient last seen in June 2024.  Does she need another appointment or can form be filled out without new appt?

## 2024-08-22 NOTE — TELEPHONE ENCOUNTER
Patient's  called asking about the status of the form he dropped off at the office this past Monday, 08/19, I didn't see anything scanned in the patient's chart.  Gayle transferred to [place] in office.

## 2024-08-22 NOTE — TELEPHONE ENCOUNTER
Patient returned call from office.  Stated that she is ok with appointment tomorrow.  Any questions contact patient.

## 2024-08-23 ENCOUNTER — OFFICE VISIT (OUTPATIENT)
Dept: FAMILY MEDICINE CLINIC | Facility: HOSPITAL | Age: 44
End: 2024-08-23

## 2024-08-23 VITALS
SYSTOLIC BLOOD PRESSURE: 114 MMHG | HEART RATE: 81 BPM | BODY MASS INDEX: 30.91 KG/M2 | DIASTOLIC BLOOD PRESSURE: 70 MMHG | OXYGEN SATURATION: 95 % | HEIGHT: 62 IN | WEIGHT: 168 LBS

## 2024-08-23 DIAGNOSIS — E11.69 HYPERLIPIDEMIA ASSOCIATED WITH TYPE 2 DIABETES MELLITUS  (HCC): ICD-10-CM

## 2024-08-23 DIAGNOSIS — M79.671 BILATERAL FOOT PAIN: ICD-10-CM

## 2024-08-23 DIAGNOSIS — I48.92 PAROXYSMAL ATRIAL FLUTTER (HCC): ICD-10-CM

## 2024-08-23 DIAGNOSIS — M76.61 TENDONITIS, ACHILLES, RIGHT: ICD-10-CM

## 2024-08-23 DIAGNOSIS — E78.5 HYPERLIPIDEMIA ASSOCIATED WITH TYPE 2 DIABETES MELLITUS  (HCC): ICD-10-CM

## 2024-08-23 DIAGNOSIS — M79.672 BILATERAL FOOT PAIN: ICD-10-CM

## 2024-08-23 DIAGNOSIS — E11.40 TYPE 2 DIABETES MELLITUS WITH DIABETIC NEUROPATHY, WITHOUT LONG-TERM CURRENT USE OF INSULIN (HCC): Primary | ICD-10-CM

## 2024-08-23 LAB — SL AMB POCT HEMOGLOBIN AIC: 7.1 (ref ?–6.5)

## 2024-08-23 PROCEDURE — 99214 OFFICE O/P EST MOD 30 MIN: CPT | Performed by: INTERNAL MEDICINE

## 2024-08-23 PROCEDURE — 83036 HEMOGLOBIN GLYCOSYLATED A1C: CPT | Performed by: INTERNAL MEDICINE

## 2024-08-23 RX ORDER — FENOFIBRATE 54 MG/1
54 TABLET ORAL DAILY
Qty: 90 TABLET | Refills: 3 | Status: SHIPPED | OUTPATIENT
Start: 2024-08-23

## 2024-08-23 NOTE — ASSESSMENT & PLAN NOTE
Lab Results   Component Value Date    HGBA1C 6.6 (H) 03/14/2024   On combination of fenofibrate 54 daily(ran out recently)  Atorvastatin 20 mg daily

## 2024-08-23 NOTE — PROGRESS NOTES
Assessment/Plan:     Diagnosis ICD-10-CM Associated Orders   1. Type 2 diabetes mellitus with diabetic neuropathy, without long-term current use of insulin (HCC)  E11.40       2. Paroxysmal atrial flutter (HCC)  I48.92       3. Hyperlipidemia associated with type 2 diabetes mellitus  (HCC)  E11.69 fenofibrate (TRICOR) 54 MG tablet    E78.5       4. Bilateral foot pain  M79.671     M79.672       5. Tendonitis, Achilles, right  M76.61           Problem List Items Addressed This Visit        Cardiovascular and Mediastinum    Paroxysmal atrial flutter (HCC)     Had ablation in April 2024-done by Dr. Pee Weber- sees Dr. Traylor now-   Has loop recorder in place   No recent sensation of palpitations            Endocrine    Hyperlipidemia associated with type 2 diabetes mellitus  (HCC)       Lab Results   Component Value Date    HGBA1C 6.6 (H) 03/14/2024   On combination of fenofibrate 54 daily(ran out recently)  Atorvastatin 20 mg daily         Relevant Medications    fenofibrate (TRICOR) 54 MG tablet    Type 2 diabetes mellitus with diabetic neuropathy, without long-term current use of insulin ([facility]) - Primary       Lab Results   Component Value Date    HGBA1C 6.6 (H) 03/14/2024   Lost insurance and is unable to afford her meds and doing lab testing   repeat hba1c here today is 7.1%   Has seen endocrinology - with [facility] in [facility] [place]- unable to afford appt   Now on [facility]- unable to afford the Jardiance 10 mg daily- will give her the [employer] site to go on to see if she qualifies for  patient assistance program        Other Visit Diagnoses     Bilateral foot pain        Tendonitis, Achilles, right                Return in about 4 months (around 12/23/2024).      Subjective:    Patient ID: Mary Arevalo is a 44 y.o. female    Here for disability evaluation form.   See form   Have       The following portions of the patient's history were reviewed and updated as appropriate: allergies,  "current medications and problem list.     Review of Systems      Objective:      Current Outpatient Medications:   •  atorvastatin (LIPITOR) 20 mg tablet, TAKE 1 TABLET BY MOUTH EVERY DAY, Disp: 90 tablet, Rfl: 2  •  dulaglutide (Trulicity) 0.75 MG/0.5ML injection, INJECT 0.5 ML (0.75 MG TOTAL) UNDER THE SKIN EVERY 7 DAYS, Disp: 2 mL, Rfl: 5  •  famotidine (PEPCID) 20 mg tablet, Take 20 mg by mouth daily at bedtime as needed for heartburn, Disp: , Rfl:   •  fenofibrate (TRICOR) 54 MG tablet, Take 1 tablet (54 mg total) by mouth daily, Disp: 90 tablet, Rfl: 3  •  Blood Glucose Monitoring Suppl (ONE TOUCH ULTRA 2) w/Device KIT, USE TO TEST BLOOD SUGAR (Patient not taking: Reported on 6/25/2024), Disp: 1 each, Rfl: 0  •  glucose blood (OneTouch Ultra) test strip, Test once daily (Patient not taking: Reported on 8/23/2024), Disp: 100 each, Rfl: 3  •  Jardiance 10 MG TABS tablet, TAKE 1 TABLET BY MOUTH EVERY DAY (Patient not taking: Reported on 8/23/2024), Disp: 30 tablet, Rfl: 5  •  Lancets (OneTouch Delica Plus [id number]) MISC, TEST 1-2 TIMES A DAY (Patient not taking: Reported on 8/23/2024), Disp: 100 each, Rfl: 3  •  medroxyPROGESTERone (PROVERA) 5 mg tablet, TAKE 1 TABLET (5 MG TOTAL) BY MOUTH DAILY., Disp: 90 tablet, Rfl: 1    Blood pressure 114/70, pulse 81, height 5' 2\" (1.575 m), weight 76.2 kg (168 lb), SpO2 95%, not currently breastfeeding.     Physical Exam  Cardiovascular:      Pulses: no weak pulses.           Dorsalis pedis pulses are 2+ on the right side and 2+ on the left side.        Posterior tibial pulses are 2+ on the right side and 2+ on the left side.   Feet:      Right foot:      Skin integrity: No ulcer, skin breakdown, erythema, warmth, callus or dry skin.      Left foot:      Skin integrity: No ulcer, skin breakdown, erythema, warmth, callus or dry skin.      Patient's shoes and socks removed.    Right Foot/Ankle   Right Foot Inspection  Skin Exam: skin normal and skin intact. No dry skin, no " warmth, no callus, no erythema, no maceration, no abnormal color, no pre-ulcer, no ulcer and no callus.     Toe Exam: ROM and strength within normal limits and tenderness. No swelling and erythema    Sensory   Proprioception: intact  Monofilament testing: intact    Vascular  Capillary refills: < 3 seconds  The right DP pulse is 2+. The right PT pulse is 2+.     Left Foot/Ankle  Left Foot Inspection  Skin Exam: skin normal and skin intact. No dry skin, no warmth, no erythema, no maceration, normal color, no pre-ulcer, no ulcer and no callus.     Toe Exam: ROM and strength within normal limits and tenderness. No swelling and no erythema.     Sensory   Proprioception: intact  Monofilament testing: intact    Vascular  Capillary refills: < 3 seconds  The left DP pulse is 2+. The left PT pulse is 2+.     Assign Risk Category  Deformity present  No loss of protective sensation  No weak pulses  Risk: 0

## 2024-08-23 NOTE — ASSESSMENT & PLAN NOTE
Lab Results   Component Value Date    HGBA1C 6.6 (H) 03/14/2024   Lost insurance and is unable to afford her meds and doing lab testing   repeat hba1c here today is 7.1%   Has seen endocrinology - with [facility] group in [facility]- unable to afford appt   Now on [facility]- unable to afford the Jardiance 10 mg daily- will give her the [employer] site to go on to see if she qualifies for  patient assistance program

## 2024-08-23 NOTE — ASSESSMENT & PLAN NOTE
Had ablation in April 2024-done by Dr. Pee Weber- sees Dr. Traylor now-   Has loop recorder in place   No recent sensation of palpitations

## 2024-08-24 LAB — MICROALBUMIN UR-MCNC: 6.4 UG/ML

## 2024-08-29 ENCOUNTER — REMOTE DEVICE CLINIC VISIT (OUTPATIENT)
Dept: CARDIOLOGY CLINIC | Facility: CLINIC | Age: 44
End: 2024-08-29

## 2024-08-29 DIAGNOSIS — I48.92 PAROXYSMAL ATRIAL FLUTTER (HCC): Primary | ICD-10-CM

## 2024-08-29 DIAGNOSIS — I48.0 PAROXYSMAL ATRIAL FIBRILLATION (HCC): ICD-10-CM

## 2024-08-29 PROCEDURE — 93298 REM INTERROG DEV EVAL SCRMS: CPT | Performed by: INTERNAL MEDICINE

## 2024-08-29 NOTE — PROGRESS NOTES
"MDT LNQ22 ILR/ ACTIVE SYSTEM IS MRI CONDITIONAL   CARELINK TRANSMISSION: LOOP RECORDER. PRESENTING RHYTHM NSR @ 73 BPM. BATTERY STATUS \"OK.\" NO PATIENT OR DEVICE ACTIVATED EPISODES. NORMAL DEVICE FUNCTION. DL   "

## 2024-10-28 LAB
LEFT EYE DIABETIC RETINOPATHY: NORMAL
RIGHT EYE DIABETIC RETINOPATHY: NORMAL

## 2024-11-01 ENCOUNTER — REMOTE DEVICE CLINIC VISIT (OUTPATIENT)
Dept: CARDIOLOGY CLINIC | Facility: CLINIC | Age: 44
End: 2024-11-01
Payer: COMMERCIAL

## 2024-11-01 DIAGNOSIS — I48.92 ATRIAL FLUTTER, UNSPECIFIED TYPE (HCC): Primary | ICD-10-CM

## 2024-11-01 PROCEDURE — 93298 REM INTERROG DEV EVAL SCRMS: CPT | Performed by: INTERNAL MEDICINE

## 2024-11-01 NOTE — PROGRESS NOTES
"MDT LNQ22 ILR/ ACTIVE SYSTEM IS MRI CONDITIONAL   CARELINK TRANSMISSION: LOOP RECORDER. PRESENTING RHYTHM NSR @ 74 BPM. BATTERY STATUS \"OK.\" NO PATIENT OR DEVICE ACTIVATED EPISODES. NORMAL DEVICE FUNCTION. DL   "

## 2024-11-08 DIAGNOSIS — E11.9 TYPE 2 DIABETES MELLITUS WITHOUT COMPLICATION, WITHOUT LONG-TERM CURRENT USE OF INSULIN (HCC): ICD-10-CM

## 2024-11-08 NOTE — TELEPHONE ENCOUNTER
Medication: glucose blood (OneTouch Ultra) test strip     Dose/Frequency: Test strip    Quantity: 100 each    Pharmacy: [facility]/pharmacy #[id number] - [place], [place] - [address]      Office:   [x] PCP/Provider -   [] Speciality/Provider -     Does the patient have enough for 3 days?   [x] Yes   [] No - Send as HP to POD

## 2024-11-09 RX ORDER — BLOOD SUGAR DIAGNOSTIC
STRIP MISCELLANEOUS
Qty: 100 EACH | Refills: 3 | Status: SHIPPED | OUTPATIENT
Start: 2024-11-09

## 2024-11-25 DIAGNOSIS — E11.9 TYPE 2 DIABETES MELLITUS WITHOUT COMPLICATION, WITHOUT LONG-TERM CURRENT USE OF INSULIN (HCC): ICD-10-CM

## 2024-11-26 RX ORDER — DULAGLUTIDE 0.75 MG/.5ML
INJECTION, SOLUTION SUBCUTANEOUS
Qty: 0.5 ML | Refills: 5 | Status: SHIPPED | OUTPATIENT
Start: 2024-11-26

## 2024-12-19 ENCOUNTER — OFFICE VISIT (OUTPATIENT)
Dept: FAMILY MEDICINE CLINIC | Facility: HOSPITAL | Age: 44
End: 2024-12-19
Payer: COMMERCIAL

## 2024-12-19 VITALS
WEIGHT: 171 LBS | DIASTOLIC BLOOD PRESSURE: 70 MMHG | HEIGHT: 62 IN | SYSTOLIC BLOOD PRESSURE: 114 MMHG | HEART RATE: 84 BPM | BODY MASS INDEX: 31.47 KG/M2 | OXYGEN SATURATION: 95 %

## 2024-12-19 DIAGNOSIS — Z12.31 ENCOUNTER FOR SCREENING MAMMOGRAM FOR MALIGNANT NEOPLASM OF BREAST: ICD-10-CM

## 2024-12-19 DIAGNOSIS — E11.9 TYPE 2 DIABETES MELLITUS WITHOUT COMPLICATION, WITHOUT LONG-TERM CURRENT USE OF INSULIN (HCC): ICD-10-CM

## 2024-12-19 DIAGNOSIS — I48.92 PAROXYSMAL ATRIAL FLUTTER (HCC): ICD-10-CM

## 2024-12-19 DIAGNOSIS — E11.69 HYPERLIPIDEMIA ASSOCIATED WITH TYPE 2 DIABETES MELLITUS  (HCC): ICD-10-CM

## 2024-12-19 DIAGNOSIS — E11.40 TYPE 2 DIABETES MELLITUS WITH DIABETIC NEUROPATHY, WITHOUT LONG-TERM CURRENT USE OF INSULIN (HCC): Primary | ICD-10-CM

## 2024-12-19 DIAGNOSIS — E78.5 HYPERLIPIDEMIA ASSOCIATED WITH TYPE 2 DIABETES MELLITUS  (HCC): ICD-10-CM

## 2024-12-19 PROBLEM — E66.09 CLASS 1 OBESITY DUE TO EXCESS CALORIES WITH BODY MASS INDEX (BMI) OF 31.0 TO 31.9 IN ADULT: Status: ACTIVE | Noted: 2024-12-19

## 2024-12-19 PROBLEM — E66.811 CLASS 1 OBESITY DUE TO EXCESS CALORIES WITH BODY MASS INDEX (BMI) OF 31.0 TO 31.9 IN ADULT: Status: ACTIVE | Noted: 2024-12-19

## 2024-12-19 PROCEDURE — 99214 OFFICE O/P EST MOD 30 MIN: CPT | Performed by: INTERNAL MEDICINE

## 2024-12-19 RX ORDER — ATORVASTATIN CALCIUM 20 MG/1
20 TABLET, FILM COATED ORAL DAILY
Qty: 90 TABLET | Refills: 2 | Status: SHIPPED | OUTPATIENT
Start: 2024-12-19

## 2024-12-19 NOTE — ASSESSMENT & PLAN NOTE
Lab Results   Component Value Date    HGBA1C 7.1 (A) 08/23/2024   Has been on trulicity but wishing to try  ozempic - now on .75- sometimes has supply chain issues

## 2024-12-19 NOTE — PROGRESS NOTES
Assessment/Plan:     Diagnosis ICD-10-CM Associated Orders   1. Type 2 diabetes mellitus with diabetic neuropathy, without long-term current use of insulin (HCC)  E11.40 CBC and differential     Comprehensive metabolic panel     Lipid Panel with Direct LDL reflex     Hemoglobin A1C     CBC and differential     Comprehensive metabolic panel     Lipid Panel with Direct LDL reflex     Hemoglobin A1C     semaglutide, 0.25 or 0.5 mg/dose, (Ozempic, 0.25 or 0.5 MG/DOSE,) 2 mg/3 mL injection pen      2. Paroxysmal atrial flutter (HCC)  I48.92       3. Hyperlipidemia associated with type 2 diabetes mellitus  (HCC)  E11.69 Lipid Panel with Direct LDL reflex    E78.5 Lipid Panel with Direct LDL reflex      4. Type 2 diabetes mellitus without complication, without long-term current use of insulin (HCC)  E11.9 Empagliflozin (Jardiance) 10 MG TABS tablet     atorvastatin (LIPITOR) 20 mg tablet      5. Encounter for screening mammogram for malignant neoplasm of breast  Z12.31 Mammo screening bilateral w 3d and cad          Problem List Items Addressed This Visit        Cardiovascular and Mediastinum    Paroxysmal atrial flutter (HCC)       Endocrine    Hyperlipidemia associated with type 2 diabetes mellitus  (HCC)    Relevant Medications    Empagliflozin (Jardiance) 10 MG TABS tablet    atorvastatin (LIPITOR) 20 mg tablet    semaglutide, 0.25 or 0.5 mg/dose, (Ozempic, 0.25 or 0.5 MG/DOSE,) 2 mg/3 mL injection pen    Other Relevant Orders    Lipid Panel with Direct LDL reflex    Type 2 diabetes mellitus with diabetic neuropathy, without long-term current use of insulin (HCC) - Primary      Lab Results   Component Value Date    HGBA1C 7.1 (A) 08/23/2024   Has been on trulicity but wishing to try  ozempic - now on .75- sometimes has supply chain issues         Relevant Medications    Empagliflozin (Jardiance) 10 MG TABS tablet    atorvastatin (LIPITOR) 20 mg tablet    semaglutide, 0.25 or 0.5 mg/dose, (Ozempic, 0.25 or 0.5 MG/DOSE,)  2 mg/3 mL injection pen    Other Relevant Orders    CBC and differential    Comprehensive metabolic panel    Lipid Panel with Direct LDL reflex    Hemoglobin A1C   Other Visit Diagnoses       Type 2 diabetes mellitus without complication, without long-term current use of insulin (HCC)        Relevant Medications    Empagliflozin (Jardiance) 10 MG TABS tablet    atorvastatin (LIPITOR) 20 mg tablet    semaglutide, 0.25 or 0.5 mg/dose, (Ozempic, 0.25 or 0.5 MG/DOSE,) 2 mg/3 mL injection pen      Encounter for screening mammogram for malignant neoplasm of breast        Relevant Orders    Mammo screening bilateral w 3d and cad            Return in about 3 months (around 3/19/2025) for Annual physical.      Subjective:    Patient ID: Mary Arevalo is a 44 y.o. female    Here for follow up  1. Dm- 110- 120  fasting and  140 pm readings   Redo labs- [facility] on [facility] after not having insurance- lab felicia  2.obesity- bmi at 31  3. Scarring on mammogram- had mass removed- Dr. Viola Gaines  4. Loop monitor in place - has occasional heavy breathing with exertion- no palpitations- has lots of steps in home- had hx of atrial flutter on Dr. Traylor review sw aflutter and - had ablation by Dr. Weber        The following portions of the patient's history were reviewed and updated as appropriate: allergies, current medications and problem list.     Review of Systems   Constitutional:  Negative for fatigue.   HENT:  Negative for congestion.    Respiratory:  Negative for shortness of breath.    Cardiovascular:  Negative for chest pain and palpitations.   All other systems reviewed and are negative.        Objective:      Current Outpatient Medications:   •  atorvastatin (LIPITOR) 20 mg tablet, Take 1 tablet (20 mg total) by mouth daily, Disp: 90 tablet, Rfl: 2  •  Empagliflozin (Jardiance) 10 MG TABS tablet, Take 1 tablet (10 mg total) by mouth daily, Disp: 30 tablet, Rfl: 5  •  famotidine (PEPCID) 20 mg tablet, Take 20 mg by mouth  "daily at bedtime as needed for heartburn, Disp: , Rfl:   •  fenofibrate (TRICOR) 54 MG tablet, Take 1 tablet (54 mg total) by mouth daily, Disp: 90 tablet, Rfl: 3  •  medroxyPROGESTERone (PROVERA) 5 mg tablet, TAKE 1 TABLET (5 MG TOTAL) BY MOUTH DAILY., Disp: 90 tablet, Rfl: 1  •  semaglutide, 0.25 or 0.5 mg/dose, (Ozempic, 0.25 or 0.5 MG/DOSE,) 2 mg/3 mL injection pen, Inject 0.75 mL (0.5 mg total) under the skin every 7 days, Disp: 9 mL, Rfl: 1  •  Blood Glucose Monitoring Suppl (ONE TOUCH ULTRA 2) w/Device KIT, USE TO TEST BLOOD SUGAR (Patient not taking: No sig reported), Disp: 1 each, Rfl: 0  •  glucose blood (OneTouch Ultra) test strip, Test once daily (Patient not taking: Reported on 12/19/2024), Disp: 100 each, Rfl: 3  •  Lancets (OneTouch Delica Plus [id number]) MISC, TEST 1-2 TIMES A DAY (Patient not taking: Reported on 8/23/2024), Disp: 100 each, Rfl: 3    Blood pressure 114/70, pulse 84, height 5' 2\" (1.575 m), weight 77.6 kg (171 lb), SpO2 95%, not currently breastfeeding.     Physical Exam  Vitals and nursing note reviewed.   Constitutional:       General: She is not in acute distress.  HENT:      Head: Normocephalic.      Right Ear: Tympanic membrane normal. There is no impacted cerumen.      Left Ear: Tympanic membrane normal. There is no impacted cerumen.      Nose: No congestion.      Mouth/Throat:      Pharynx: No posterior oropharyngeal erythema.   Eyes:      General:         Right eye: No discharge.         Left eye: No discharge.   Cardiovascular:      Rate and Rhythm: Normal rate and regular rhythm.      Heart sounds: No murmur heard.  Pulmonary:      Breath sounds: No wheezing or rhonchi.   Abdominal:      Tenderness: There is no abdominal tenderness.   Musculoskeletal:         General: No swelling or tenderness.   Lymphadenopathy:      Cervical: No cervical adenopathy.   Skin:     Findings: No erythema.   Neurological:      Mental Status: She is alert and oriented to person, place, and time. "   Psychiatric:         Mood and Affect: Mood normal.         Thought Content: Thought content normal.

## 2024-12-20 ENCOUNTER — TELEPHONE (OUTPATIENT)
Dept: FAMILY MEDICINE CLINIC | Facility: HOSPITAL | Age: 44
End: 2024-12-20

## 2024-12-20 NOTE — TELEPHONE ENCOUNTER
PA for Ozempic 0.25mg SUBMITTED to [facility]    via    []CMM-KEY:   [x]Surescripts-Case ID #: [id number]   []Availity-Auth ID #  []Faxed to plan   []Other website   []Phone call Case ID #     [x]PA sent as URGENT    All office notes, labs and other pertaining documents and studies sent. Clinical questions answered. Awaiting determination from insurance company.     Turnaround time for your insurance to make a decision on your Prior Authorization can take 7-21 business days.

## 2024-12-20 NOTE — TELEPHONE ENCOUNTER
PA for Ozempic 0.25mg APPROVED     Date(s) approved December 20, 2024 to December 20, 2025     Case #: [id number]     Patient advised by          []MyChart Message  [x]Phone call- Patient verbalized understanding   []LMOM  []L/M to call office as no active Communication consent on file  []Unable to leave detailed message as VM not approved on Communication consent       Pharmacy advised by    [x]Fax  []Phone call    Approval letter scanned into Media No

## 2025-01-31 ENCOUNTER — REMOTE DEVICE CLINIC VISIT (OUTPATIENT)
Dept: CARDIOLOGY CLINIC | Facility: CLINIC | Age: 45
End: 2025-01-31
Payer: COMMERCIAL

## 2025-01-31 ENCOUNTER — RESULTS FOLLOW-UP (OUTPATIENT)
Dept: CARDIOLOGY CLINIC | Facility: CLINIC | Age: 45
End: 2025-01-31

## 2025-01-31 DIAGNOSIS — I48.3 TYPICAL ATRIAL FLUTTER (HCC): Primary | ICD-10-CM

## 2025-01-31 PROCEDURE — 93298 REM INTERROG DEV EVAL SCRMS: CPT | Performed by: INTERNAL MEDICINE

## 2025-02-12 LAB
ALBUMIN SERPL-MCNC: 4.6 G/DL (ref 3.9–4.9)
ALP SERPL-CCNC: 91 IU/L (ref 44–121)
ALT SERPL-CCNC: 62 IU/L (ref 0–32)
AST SERPL-CCNC: 42 IU/L (ref 0–40)
BASOPHILS # BLD AUTO: 0 X10E3/UL (ref 0–0.2)
BASOPHILS NFR BLD AUTO: 1 %
BILIRUB SERPL-MCNC: 1.2 MG/DL (ref 0–1.2)
BUN SERPL-MCNC: 9 MG/DL (ref 6–24)
BUN/CREAT SERPL: 15 (ref 9–23)
CALCIUM SERPL-MCNC: 10 MG/DL (ref 8.7–10.2)
CHLORIDE SERPL-SCNC: 107 MMOL/L (ref 96–106)
CHOLEST SERPL-MCNC: 145 MG/DL (ref 100–199)
CO2 SERPL-SCNC: 22 MMOL/L (ref 20–29)
CREAT SERPL-MCNC: 0.61 MG/DL (ref 0.57–1)
EGFR: 114 ML/MIN/1.73
EOSINOPHIL # BLD AUTO: 0.2 X10E3/UL (ref 0–0.4)
EOSINOPHIL NFR BLD AUTO: 2 %
ERYTHROCYTE [DISTWIDTH] IN BLOOD BY AUTOMATED COUNT: 15.7 % (ref 11.7–15.4)
EST. AVERAGE GLUCOSE BLD GHB EST-MCNC: 166 MG/DL
GLOBULIN SER-MCNC: 2.2 G/DL (ref 1.5–4.5)
GLUCOSE SERPL-MCNC: 153 MG/DL (ref 70–99)
HBA1C MFR BLD: 7.4 % (ref 4.8–5.6)
HCT VFR BLD AUTO: 52.6 % (ref 34–46.6)
HDLC SERPL-MCNC: 35 MG/DL
HGB BLD-MCNC: 17.1 G/DL (ref 11.1–15.9)
IMM GRANULOCYTES # BLD: 0 X10E3/UL (ref 0–0.1)
IMM GRANULOCYTES NFR BLD: 0 %
LDLC SERPL CALC-MCNC: 68 MG/DL (ref 0–99)
LDLC/HDLC SERPL: 1.9 RATIO (ref 0–3.2)
LYMPHOCYTES # BLD AUTO: 2.3 X10E3/UL (ref 0.7–3.1)
LYMPHOCYTES NFR BLD AUTO: 28 %
MCH RBC QN AUTO: 27.4 PG (ref 26.6–33)
MCHC RBC AUTO-ENTMCNC: 32.5 G/DL (ref 31.5–35.7)
MCV RBC AUTO: 84 FL (ref 79–97)
MONOCYTES # BLD AUTO: 0.3 X10E3/UL (ref 0.1–0.9)
MONOCYTES NFR BLD AUTO: 4 %
NEUTROPHILS # BLD AUTO: 5.5 X10E3/UL (ref 1.4–7)
NEUTROPHILS NFR BLD AUTO: 65 %
PLATELET # BLD AUTO: 217 X10E3/UL (ref 150–450)
POTASSIUM SERPL-SCNC: 5 MMOL/L (ref 3.5–5.2)
PROT SERPL-MCNC: 6.8 G/DL (ref 6–8.5)
RBC # BLD AUTO: 6.23 X10E6/UL (ref 3.77–5.28)
SL AMB VLDL CHOLESTEROL CALC: 42 MG/DL (ref 5–40)
SODIUM SERPL-SCNC: 145 MMOL/L (ref 134–144)
TRIGL SERPL-MCNC: 256 MG/DL (ref 0–149)
WBC # BLD AUTO: 8.4 X10E3/UL (ref 3.4–10.8)

## 2025-02-16 ENCOUNTER — RESULTS FOLLOW-UP (OUTPATIENT)
Dept: FAMILY MEDICINE CLINIC | Facility: HOSPITAL | Age: 45
End: 2025-02-16

## 2025-03-20 ENCOUNTER — OFFICE VISIT (OUTPATIENT)
Dept: FAMILY MEDICINE CLINIC | Facility: HOSPITAL | Age: 45
End: 2025-03-20
Payer: COMMERCIAL

## 2025-03-20 VITALS
SYSTOLIC BLOOD PRESSURE: 118 MMHG | OXYGEN SATURATION: 92 % | DIASTOLIC BLOOD PRESSURE: 70 MMHG | HEIGHT: 62 IN | HEART RATE: 90 BPM | WEIGHT: 168 LBS | BODY MASS INDEX: 30.91 KG/M2

## 2025-03-20 DIAGNOSIS — E66.09 CLASS 1 OBESITY DUE TO EXCESS CALORIES WITH SERIOUS COMORBIDITY AND BODY MASS INDEX (BMI) OF 31.0 TO 31.9 IN ADULT: ICD-10-CM

## 2025-03-20 DIAGNOSIS — I48.92 PAROXYSMAL ATRIAL FLUTTER (HCC): ICD-10-CM

## 2025-03-20 DIAGNOSIS — E78.6 LOW HDL (UNDER 40): ICD-10-CM

## 2025-03-20 DIAGNOSIS — Z95.818 STATUS POST PLACEMENT OF IMPLANTABLE LOOP RECORDER: ICD-10-CM

## 2025-03-20 DIAGNOSIS — E78.5 HYPERLIPIDEMIA ASSOCIATED WITH TYPE 2 DIABETES MELLITUS  (HCC): ICD-10-CM

## 2025-03-20 DIAGNOSIS — E66.811 CLASS 1 OBESITY DUE TO EXCESS CALORIES WITH SERIOUS COMORBIDITY AND BODY MASS INDEX (BMI) OF 31.0 TO 31.9 IN ADULT: ICD-10-CM

## 2025-03-20 DIAGNOSIS — E11.69 HYPERLIPIDEMIA ASSOCIATED WITH TYPE 2 DIABETES MELLITUS  (HCC): ICD-10-CM

## 2025-03-20 DIAGNOSIS — E11.40 TYPE 2 DIABETES MELLITUS WITH DIABETIC NEUROPATHY, WITHOUT LONG-TERM CURRENT USE OF INSULIN (HCC): Primary | ICD-10-CM

## 2025-03-20 PROCEDURE — 99396 PREV VISIT EST AGE 40-64: CPT | Performed by: INTERNAL MEDICINE

## 2025-03-20 NOTE — PROGRESS NOTES
Assessment/Plan:     Diagnosis ICD-10-CM Associated Orders   1. Type 2 diabetes mellitus with diabetic neuropathy, without long-term current use of insulin (HCC)  E11.40 Albumin / creatinine urine ratio     semaglutide, 1 mg/dose, (Ozempic) 2 mg/1.5 mL injection pen     CBC and differential     Comprehensive metabolic panel     Hemoglobin A1C     CBC and differential     Comprehensive metabolic panel     Hemoglobin A1C      2. Low HDL (under 40)  E78.6       3. Paroxysmal atrial flutter (HCC)  I48.92       4. Hyperlipidemia associated with type 2 diabetes mellitus  (HCC)  E11.69     E78.5       5. s/p [employer] loop recorder 4/16/2024  Z95.818       6. Class 1 obesity due to excess calories with serious comorbidity and body mass index (BMI) of 31.0 to 31.9 in adult  E66.811     E66.09     Z68.31           Problem List Items Addressed This Visit        Cardiovascular and Mediastinum    Paroxysmal atrial flutter (HCC)    Has upcoming appt with cardiology  No recent palpitations   Has loop monitor in place            Endocrine    Hyperlipidemia associated with type 2 diabetes mellitus  (HCC)    Relevant Medications    semaglutide, 1 mg/dose, (Ozempic) 2 mg/1.5 mL injection pen    Type 2 diabetes mellitus with diabetic neuropathy, without long-term current use of insulin (HCC) - Primary      Lab Results   Component Value Date    HGBA1C 7.4 (H) 02/11/2025   Celebrated 14th year wedding anniversary last night- is up to 180  Was well controlled before that then had gi virus   I reviewed numbers and overall control is improving         Relevant Medications    semaglutide, 1 mg/dose, (Ozempic) 2 mg/1.5 mL injection pen    Other Relevant Orders    Albumin / creatinine urine ratio    CBC and differential    Comprehensive metabolic panel    Hemoglobin A1C       Surgery/Wound/Pain    s/p [employer]tronic loop recorder 4/16/2024       Other    Class 1 obesity due to excess calories with body mass index (BMI) of 31.0 to 31.9 in adult     Low HDL (under 40)    Father had high triglycerides with cardiac dx-   Will have her try 250 mg of no flush niacin              Return in about 3 months (around 6/10/2025).      Subjective:    Patient ID: Mary Arevalo is a 44 y.o. female    Here for annual physical      1. Diabetes - hba1c went up over 7- with ozempic - weight is down several more pounds-and will hold on the jardiance- is having leg tightness in thighs   Will increase the ozempic to next dose at 1 mg daose  2.  Discussed  advance directive   Sees dentist  q6 months.  Eye doctor recently- pt had laser procedure- hx of scar tissue  Sees dentist q 6 months  3. Og /gyn to see new doctor in may- Dr. Bryson          The following portions of the patient's history were reviewed and updated as appropriate: allergies, current medications and problem list.     Review of Systems   Constitutional:  Negative for diaphoresis.   HENT:  Negative for congestion.    Respiratory:  Negative for shortness of breath.    Cardiovascular:  Negative for chest pain.   Gastrointestinal:  Positive for constipation.   Genitourinary:  Negative for genital sores.   All other systems reviewed and are negative.        Objective:      Current Outpatient Medications:   •  atorvastatin (LIPITOR) 20 mg tablet, Take 1 tablet (20 mg total) by mouth daily, Disp: 90 tablet, Rfl: 2  •  famotidine (PEPCID) 20 mg tablet, Take 20 mg by mouth daily at bedtime as needed for heartburn, Disp: , Rfl:   •  fenofibrate (TRICOR) 54 MG tablet, Take 1 tablet (54 mg total) by mouth daily, Disp: 90 tablet, Rfl: 3  •  semaglutide, 1 mg/dose, (Ozempic) 2 mg/1.5 mL injection pen, Inject 0.75 mL (1 mg total) under the skin every 7 days, Disp: 9 mL, Rfl: 3  •  Blood Glucose Monitoring Suppl (ONE TOUCH ULTRA 2) w/Device KIT, USE TO TEST BLOOD SUGAR (Patient not taking: No sig reported), Disp: 1 each, Rfl: 0  •  glucose blood (OneTouch Ultra) test strip, Test once daily (Patient not taking: Reported on  "3/20/2025), Disp: 100 each, Rfl: 3  •  Lancets (OneTouch Delica Plus [id number]) MISC, TEST 1-2 TIMES A DAY (Patient not taking: Reported on 8/23/2024), Disp: 100 each, Rfl: 3    Blood pressure 118/70, pulse 90, height 5' 2\" (1.575 m), weight 76.2 kg (168 lb), SpO2 92%, not currently breastfeeding.     Physical Exam  Vitals and nursing note reviewed.   Constitutional:       General: She is not in acute distress.     Appearance: She is not ill-appearing.   HENT:      Head: Normocephalic.      Right Ear: Tympanic membrane normal. There is no impacted cerumen.      Left Ear: Tympanic membrane normal. There is no impacted cerumen.      Nose: No congestion.      Mouth/Throat:      Pharynx: No posterior oropharyngeal erythema.   Eyes:      General:         Right eye: No discharge.         Left eye: No discharge.   Cardiovascular:      Rate and Rhythm: Normal rate and regular rhythm.      Heart sounds: No murmur heard.  Pulmonary:      Breath sounds: No wheezing or rhonchi.   Abdominal:      General: There is no distension.      Palpations: Abdomen is soft.   Musculoskeletal:         General: No tenderness.      Cervical back: No tenderness.      Right lower leg: No edema.      Left lower leg: No edema.      Comments: Mild upper thigh pain   Lymphadenopathy:      Cervical: No cervical adenopathy.   Skin:     Findings: No bruising or erythema.   Neurological:      Mental Status: She is alert. Mental status is at baseline.   Psychiatric:         Mood and Affect: Mood normal.         Thought Content: Thought content normal.         Judgment: Judgment normal.        "

## 2025-03-20 NOTE — ASSESSMENT & PLAN NOTE
Lab Results   Component Value Date    HGBA1C 7.4 (H) 02/11/2025   Celebrated 14th year wedding anniversary last night- is up to 180  Was well controlled before that then had gi virus   I reviewed numbers and overall control is improving

## 2025-03-23 LAB
ALBUMIN/CREAT UR: <6 MG/G CREAT (ref 0–29)
CREAT UR-MCNC: 46.2 MG/DL
MICROALBUMIN UR-MCNC: <3 UG/ML

## 2025-04-23 ENCOUNTER — REMOTE DEVICE CLINIC VISIT (OUTPATIENT)
Dept: CARDIOLOGY CLINIC | Facility: CLINIC | Age: 45
End: 2025-04-23
Payer: COMMERCIAL

## 2025-04-23 ENCOUNTER — RESULTS FOLLOW-UP (OUTPATIENT)
Dept: CARDIOLOGY CLINIC | Facility: CLINIC | Age: 45
End: 2025-04-23

## 2025-04-23 DIAGNOSIS — I48.3 TYPICAL ATRIAL FLUTTER (HCC): Primary | ICD-10-CM

## 2025-04-23 PROCEDURE — 93298 REM INTERROG DEV EVAL SCRMS: CPT | Performed by: STUDENT IN AN ORGANIZED HEALTH CARE EDUCATION/TRAINING PROGRAM

## 2025-04-23 NOTE — PROGRESS NOTES
"MDT LNQ22 ILR/ ACTIVE SYSTEM IS MRI CONDITIONAL   [employer]EL[employer] TRANSMISSION: LOOP RECORDER. PRESENTING RHYTHM NSR @ 86 BPM. BATTERY STATUS \"OK.\" NO PATIENT OR DEVICE ACTIVATED EPISODES. NORMAL DEVICE FUNCTION. DL   "

## 2025-04-27 DIAGNOSIS — E11.40 TYPE 2 DIABETES MELLITUS WITH DIABETIC NEUROPATHY, WITHOUT LONG-TERM CURRENT USE OF INSULIN (HCC): ICD-10-CM

## 2025-04-28 ENCOUNTER — TELEPHONE (OUTPATIENT)
Dept: FAMILY MEDICINE CLINIC | Facility: HOSPITAL | Age: 45
End: 2025-04-28

## 2025-04-28 RX ORDER — SEMAGLUTIDE 1.34 MG/ML
INJECTION, SOLUTION SUBCUTANEOUS
Refills: 3 | OUTPATIENT
Start: 2025-04-28

## 2025-04-28 NOTE — TELEPHONE ENCOUNTER
Reason for call:   [x] Prior Auth  [] Other:     Caller:  [] Patient  [x] Pharmacy  Name: [facility]  Address: [address] [place], [place] [address]   Callback Number: [phone]     Medication: semaglutide, 1 mg/dose, (Ozempic) 2 mg/1.5 mL injection pen     Dose/Frequency: Inject 0.75 mL (1 mg total) under the skin every 7 days     Quantity: 9 mL    Ordering Provider:   [x] PCP/Provider -   [] Speciality/Provider -

## 2025-04-29 NOTE — TELEPHONE ENCOUNTER
PA for semaglutide, 1 mg/dose, (Ozempic) 2 mg/1.5 mL injection pen CANCELLED due to     [x]Approval on file-dates approved 12/20/24-12/20/25  []Medication already on Formulary  []Brand Name Preferred  []Patient no longer covered by insurance  []Therapy Changed/Medication Discontinued    Patient advised by     []My Chart Message  [x]Phone call    Scanned into Media  no

## 2025-04-29 NOTE — TELEPHONE ENCOUNTER
PA for semaglutide, 1 mg/dose, (Ozempic) 2 mg/1.5 mL injection pen SUBMITTED to [facility]    via    []CMM-KEY:   [x]Surescripts-Case ID # [id number]  []Availity-Auth ID # NDC #   []Faxed to plan   []Other website   []Phone call Case ID #     [x]PA sent as URGENT    All office notes, labs and other pertaining documents and studies sent. Clinical questions answered. Awaiting determination from insurance company.     Turnaround time for your insurance to make a decision on your Prior Authorization can take 7-21 business days.

## 2025-05-14 DIAGNOSIS — E11.9 TYPE 2 DIABETES MELLITUS WITHOUT COMPLICATION, WITHOUT LONG-TERM CURRENT USE OF INSULIN (HCC): ICD-10-CM

## 2025-05-15 RX ORDER — LANCETS 30 GAUGE
EACH MISCELLANEOUS
Qty: 100 EACH | Refills: 1 | Status: SHIPPED | OUTPATIENT
Start: 2025-05-15

## 2025-05-21 ENCOUNTER — RESULTS FOLLOW-UP (OUTPATIENT)
Dept: FAMILY MEDICINE CLINIC | Facility: HOSPITAL | Age: 45
End: 2025-05-21

## 2025-05-21 ENCOUNTER — HOSPITAL ENCOUNTER (OUTPATIENT)
Dept: MAMMOGRAPHY | Facility: CLINIC | Age: 45
Discharge: HOME | End: 2025-05-21
Payer: COMMERCIAL

## 2025-05-21 DIAGNOSIS — R92.8 ABNORMAL MAMMOGRAM: ICD-10-CM

## 2025-05-21 PROCEDURE — G0279 TOMOSYNTHESIS, MAMMO: HCPCS

## 2025-05-21 PROCEDURE — 77066 DX MAMMO INCL CAD BI: CPT

## 2025-06-10 ENCOUNTER — HOSPITAL ENCOUNTER (OUTPATIENT)
Dept: NON INVASIVE DIAGNOSTICS | Facility: HOSPITAL | Age: 45
Discharge: HOME | End: 2025-06-10
Attending: INTERNAL MEDICINE
Payer: COMMERCIAL

## 2025-06-10 ENCOUNTER — OFFICE VISIT (OUTPATIENT)
Dept: FAMILY MEDICINE CLINIC | Facility: HOSPITAL | Age: 45
End: 2025-06-10
Payer: COMMERCIAL

## 2025-06-10 VITALS
HEART RATE: 85 BPM | BODY MASS INDEX: 30.73 KG/M2 | HEIGHT: 62 IN | DIASTOLIC BLOOD PRESSURE: 70 MMHG | WEIGHT: 167 LBS | OXYGEN SATURATION: 91 % | SYSTOLIC BLOOD PRESSURE: 118 MMHG

## 2025-06-10 DIAGNOSIS — M79.652 LEFT THIGH PAIN: ICD-10-CM

## 2025-06-10 DIAGNOSIS — M25.562 LEFT MEDIAL KNEE PAIN: ICD-10-CM

## 2025-06-10 DIAGNOSIS — I48.92 PAROXYSMAL ATRIAL FLUTTER (HCC): ICD-10-CM

## 2025-06-10 DIAGNOSIS — D25.2 FIBROIDS, SUBSEROUS: ICD-10-CM

## 2025-06-10 DIAGNOSIS — Z95.818 STATUS POST PLACEMENT OF IMPLANTABLE LOOP RECORDER: ICD-10-CM

## 2025-06-10 DIAGNOSIS — E11.69 HYPERLIPIDEMIA ASSOCIATED WITH TYPE 2 DIABETES MELLITUS  (HCC): ICD-10-CM

## 2025-06-10 DIAGNOSIS — K21.9 GASTROESOPHAGEAL REFLUX DISEASE, UNSPECIFIED WHETHER ESOPHAGITIS PRESENT: Chronic | ICD-10-CM

## 2025-06-10 DIAGNOSIS — E11.40 TYPE 2 DIABETES MELLITUS WITH DIABETIC NEUROPATHY, WITHOUT LONG-TERM CURRENT USE OF INSULIN (HCC): Primary | ICD-10-CM

## 2025-06-10 DIAGNOSIS — E78.5 HYPERLIPIDEMIA ASSOCIATED WITH TYPE 2 DIABETES MELLITUS  (HCC): ICD-10-CM

## 2025-06-10 PROBLEM — E66.09 CLASS 1 OBESITY DUE TO EXCESS CALORIES WITH BODY MASS INDEX (BMI) OF 31.0 TO 31.9 IN ADULT: Status: RESOLVED | Noted: 2024-12-19 | Resolved: 2025-06-10

## 2025-06-10 PROBLEM — E66.811 CLASS 1 OBESITY DUE TO EXCESS CALORIES WITH BODY MASS INDEX (BMI) OF 31.0 TO 31.9 IN ADULT: Status: RESOLVED | Noted: 2024-12-19 | Resolved: 2025-06-10

## 2025-06-10 PROCEDURE — 99214 OFFICE O/P EST MOD 30 MIN: CPT | Performed by: INTERNAL MEDICINE

## 2025-06-10 PROCEDURE — 93971 EXTREMITY STUDY: CPT

## 2025-06-10 PROCEDURE — 93971 EXTREMITY STUDY: CPT | Performed by: SURGERY

## 2025-06-10 NOTE — ASSESSMENT & PLAN NOTE
To see Cardiology- has to reschedule as she had insurance issues- has monitor in place- she is hoping to get that removed

## 2025-06-10 NOTE — PROGRESS NOTES
Assessment/Plan:     Diagnosis ICD-10-CM Associated Orders   1. Type 2 diabetes mellitus with diabetic neuropathy, without long-term current use of insulin ([facility])  E11.40       2. Fibroids, subserous  D25.2       3. Hyperlipidemia associated with type 2 diabetes mellitus  (HCC)  E11.69     E78.5       4. Paroxysmal atrial flutter (HCC)  I48.92       5. Gastroesophageal reflux disease, unspecified whether esophagitis present  K21.9       6. s/p Medtronic loop recorder 4/16/2024  Z95.818       7. Left medial knee pain  M25.562 XR knee 3 vw left non injury      8. Left thigh pain  M79.652 VAS VENOUS DUPLEX -LOWER LIMB UNILATERAL          Problem List Items Addressed This Visit          Cardiovascular and Mediastinum    Paroxysmal atrial flutter (HCC)    To see Cardiology- has to reschedule as she had insurance issues- has monitor in place- she is hoping to get that removed            Digestive    GERD (gastroesophageal reflux disease) (Chronic)       Endocrine    Hyperlipidemia associated with type 2 diabetes mellitus  ([facility])      Lab Results   Component Value Date    HGBA1C 7.4 (H) 02/11/2025   On a torvastatin 20 mg and fenofibrate 54 mg daily   Has slip to get labs done         Type 2 diabetes mellitus with diabetic neuropathy, without long-term current use of insulin ([facility]) - Primary      Lab Results   Component Value Date    HGBA1C 7.4 (H) 02/11/2025   Had one day of  vomting and diarrhea-? Food related- is tolerating the ozempic  1 mg dose now            Genitourinary    Fibroids, subserous    Has appt to be rescheduled as she got her  period last week- no excess cramping            Surgery/Wound/Pain    s/p Medtronic loop recorder 4/16/2024     Other Visit Diagnoses         Left medial knee pain        Relevant Orders    XR knee 3 vw left non injury (Completed)      Left thigh pain        Relevant Orders    VAS VENOUS DUPLEX -LOWER LIMB UNILATERAL (Completed)              Return in about 4 months (around  "10/10/2025) for Recheck.      Subjective:    Patient ID: Mary Arevalo is a 44 y.o. female    HPI    The following portions of the patient's history were reviewed and updated as appropriate: allergies, current medications and problem list.     Review of Systems      Objective:    Current Medications[1]    Blood pressure 118/70, pulse 85, height 5' 2\" (1.575 m), weight 75.8 kg (167 lb), last menstrual period 04/27/2025, SpO2 91%, not currently breastfeeding.     Physical Exam    Cardiovascular:      Pulses: no weak pulses.           Dorsalis pedis pulses are 2+ on the right side and 2+ on the left side.        Posterior tibial pulses are 2+ on the right side and 2+ on the left side.   Feet:      Right foot:      Skin integrity: No ulcer, skin breakdown, erythema, warmth, callus or dry skin.      Left foot:      Skin integrity: No ulcer, skin breakdown, erythema, warmth, callus or dry skin.        Patient's shoes and socks removed.    Right Foot/Ankle   Right Foot Inspection  Skin Exam: skin normal and skin intact. No dry skin, no warmth, no callus, no erythema, no maceration, no abnormal color, no pre-ulcer, no ulcer and no callus.     Toe Exam: ROM and strength within normal limits. No swelling, no tenderness, erythema and  no right toe deformity    Sensory   Proprioception: intact  Monofilament testing: intact    Vascular  The right DP pulse is 2+. The right PT pulse is 2+.     Left Foot/Ankle  Left Foot Inspection  Skin Exam: skin normal and skin intact. No dry skin, no warmth, no erythema, no maceration, normal color, no pre-ulcer, no ulcer and no callus.     Toe Exam: ROM and strength within normal limits. No swelling, no tenderness, no erythema and no left toe deformity.     Sensory   Proprioception: intact  Monofilament testing: intact    Vascular  Capillary refills: < 3 seconds  The left DP pulse is 2+. The left PT pulse is 2+.     Assign Risk Category  No deformity present  No loss of protective " sensation  No weak pulses  Risk: 0            [1]   Current Outpatient Medications:     atorvastatin (LIPITOR) 20 mg tablet, Take 1 tablet (20 mg total) by mouth daily, Disp: 90 tablet, Rfl: 2    famotidine (PEPCID) 20 mg tablet, Take 20 mg by mouth daily at bedtime as needed for heartburn, Disp: , Rfl:     fenofibrate (TRICOR) 54 MG tablet, Take 1 tablet (54 mg total) by mouth daily, Disp: 90 tablet, Rfl: 3    glucose blood (OneTouch Ultra) test strip, Test once daily, Disp: 100 each, Rfl: 3    Lancets (OneTouch Delica Plus [id number]) MISC, TEST 1-2 TIMES A DAY, Disp: 100 each, Rfl: 1    semaglutide, 1 mg/dose, (Ozempic) 2 mg/1.5 mL injection pen, Inject 0.75 mL (1 mg total) under the skin every 7 days, Disp: 9 mL, Rfl: 3

## 2025-06-10 NOTE — ASSESSMENT & PLAN NOTE
Lab Results   Component Value Date    HGBA1C 7.4 (H) 02/11/2025   Had one day of  vomting and diarrhea-? Food related- is tolerating the ozempic  1 mg dose now

## 2025-06-10 NOTE — ASSESSMENT & PLAN NOTE
Lab Results   Component Value Date    HGBA1C 7.4 (H) 02/11/2025   On a torvastatin 20 mg and fenofibrate 54 mg daily   Has slip to get labs done

## 2025-06-11 ENCOUNTER — RESULTS FOLLOW-UP (OUTPATIENT)
Dept: FAMILY MEDICINE CLINIC | Facility: HOSPITAL | Age: 45
End: 2025-06-11

## 2025-06-12 ENCOUNTER — HOSPITAL ENCOUNTER (OUTPATIENT)
Dept: RADIOLOGY | Facility: HOSPITAL | Age: 45
End: 2025-06-12
Payer: COMMERCIAL

## 2025-06-12 DIAGNOSIS — M25.562 LEFT MEDIAL KNEE PAIN: ICD-10-CM

## 2025-06-12 PROCEDURE — 73562 X-RAY EXAM OF KNEE 3: CPT

## 2025-06-17 ENCOUNTER — OFFICE VISIT (OUTPATIENT)
Dept: URGENT CARE | Facility: CLINIC | Age: 45
End: 2025-06-17
Payer: COMMERCIAL

## 2025-06-17 ENCOUNTER — APPOINTMENT (OUTPATIENT)
Dept: RADIOLOGY | Facility: CLINIC | Age: 45
End: 2025-06-17
Payer: COMMERCIAL

## 2025-06-17 VITALS
SYSTOLIC BLOOD PRESSURE: 116 MMHG | HEART RATE: 71 BPM | OXYGEN SATURATION: 98 % | TEMPERATURE: 97.6 F | RESPIRATION RATE: 18 BRPM | DIASTOLIC BLOOD PRESSURE: 78 MMHG

## 2025-06-17 DIAGNOSIS — M79.604 PAIN OF RIGHT LOWER EXTREMITY DUE TO INJURY: ICD-10-CM

## 2025-06-17 DIAGNOSIS — M79.604 PAIN OF RIGHT LOWER EXTREMITY DUE TO INJURY: Primary | ICD-10-CM

## 2025-06-17 PROCEDURE — 73630 X-RAY EXAM OF FOOT: CPT

## 2025-06-17 PROCEDURE — 99213 OFFICE O/P EST LOW 20 MIN: CPT

## 2025-06-17 NOTE — PATIENT INSTRUCTIONS
Patient Instructions   The final xray result will appear in your mychart;  Ice as needed.  Acetaminophen and/or ibuprofen for pain and inflammation.  Follow-up with orthopedics.  PCP follow-up in 3-5 days  Proceed to the ER if symptoms worsen.      If tests have been performed at [facility], our office will contact you with results if changes need to be made to the care plan discussed with you at the visit.  You can review your full results on [facility] MyChart.

## 2025-06-17 NOTE — PROGRESS NOTES
[facility]        NAME: Mary Arevalo is a 44 y.o. female  : 1980    MRN: [id number]  DATE: 2025  TIME: 12:40 PM    Assessment and Plan   Pain of right lower extremity due to injury [M79.604]  1. Pain of right lower extremity due to injury  XR foot 3+ vw right    Ambulatory Referral to Orthopedic Surgery        Preliminary reading of R foot xrays without acute osseous abnormality  Ortho f/u    Patient Instructions   The final xray result will appear in your mychart;  Ice as needed.  Acetaminophen and/or ibuprofen for pain and inflammation.  Follow-up with orthopedics.  PCP follow-up in 3-5 days  Proceed to the ER if symptoms worsen.      If tests have been performed at [facility], our office will contact you with results if changes need to be made to the care plan discussed with you at the visit.  You can review your full results on [facility] MyChart.    Chief Complaint     Chief Complaint   Patient presents with    Foot Pain     Right foot, patient fell down the steps last night when she bent two of her right toes, reports swelling and bruising, and she hasn't been managing with any meds          History of Present Illness       42 y/o F presents for R foot injury LN. Pt admits she fell down her stairs, hurting her 2nd and 3rd digit. Denies hitting head or LOC. Pain rated 9/10. Applied ice. Pain worse with walking. Denies any other injuries.        Review of Systems   Review of Systems   Respiratory:  Negative for shortness of breath and wheezing.    Cardiovascular:  Negative for chest pain and palpitations.   Musculoskeletal:  Positive for gait problem and joint swelling.   Neurological:  Negative for dizziness and light-headedness.         Current Medications     Current Medications[1]    Current Allergies     Allergies as of 2025 - Reviewed 2025   Allergen Reaction Noted    B complex-c [allbee-c] Irritability 2025            The following portions of the patient's  history were reviewed and updated as appropriate: allergies, current medications, past family history, past medical history, past social history, past surgical history and problem list.     Past Medical History[2]    Past Surgical History[3]    Family History[4]      Medications have been verified.        Objective   /78   Pulse 71   Temp 97.6 °F (36.4 °C) (Tympanic)   Resp 18   LMP 05/25/2025 (Exact Date)   SpO2 98%   Patient's last menstrual period was 05/25/2025 (exact date).       Physical Exam     Physical Exam  Vitals and nursing note reviewed.   Constitutional:       General: She is not in acute distress.     Appearance: She is not toxic-appearing.   HENT:      Head: Normocephalic and atraumatic.     Eyes:      Extraocular Movements: Extraocular movements intact.      Conjunctiva/sclera: Conjunctivae normal.       Cardiovascular:      Rate and Rhythm: Normal rate and regular rhythm.   Pulmonary:      Effort: Pulmonary effort is normal.      Breath sounds: Normal breath sounds.     Musculoskeletal:         General: No swelling or tenderness. Normal range of motion.      Comments: NVI  TTP of 2nd and 3rd digit     Skin:     Findings: No bruising or erythema.     Neurological:      Mental Status: She is alert.      Coordination: Coordination normal.     Psychiatric:         Mood and Affect: Mood normal.         Behavior: Behavior normal.                        [1]   Current Outpatient Medications:     atorvastatin (LIPITOR) 20 mg tablet, Take 1 tablet (20 mg total) by mouth daily, Disp: 90 tablet, Rfl: 2    famotidine (PEPCID) 20 mg tablet, Take 20 mg by mouth daily at bedtime as needed for heartburn, Disp: , Rfl:     fenofibrate (TRICOR) 54 MG tablet, Take 1 tablet (54 mg total) by mouth daily, Disp: 90 tablet, Rfl: 3    glucose blood (OneTouch Ultra) test strip, Test once daily, Disp: 100 each, Rfl: 3    Lancets (OneTouch Delica Plus [id number]) MISC, TEST 1-2 TIMES A DAY, Disp: 100 each, Rfl: 1     "semaglutide, 1 mg/dose, (Ozempic) 2 mg/1.5 mL injection pen, Inject 0.75 mL (1 mg total) under the skin every 7 days, Disp: 9 mL, Rfl: 3  [2]   Past Medical History:  Diagnosis Date    Adnexal cyst     left \"fallopian tube\"    Anesthesia     per pt \"sometimes oxygen level drops very low\" not all the time -has occurred more than once under anesthesia\"    Atrial fibrillation (HCC)     [facility] cardio Dr SUZANNA Parkinson--per pt \"did not have ablation and takes flecanide and metoprolol for it\"    Basal cell carcinoma 09/28/2022    left interauricular ear    Dental crowns present     Diabetes mellitus (HCC)     type 2    Elevated LFTs 06/01/2020    GERD (gastroesophageal reflux disease)     GERD (gastroesophageal reflux disease)     Hyperlipidemia     Irregular heart beat     hx of A flutter per pt    Irregular periods     Melanoma (HCC) 2022    s/p Medtronic loop recorder 4/16/2024 04/16/2024    s/p typical atrial flutter ablation 4/16/2024 04/16/2024    Varicella    [3]   Past Surgical History:  Procedure Laterality Date    BREAST LUMPECTOMY Right 07/28/2023    Procedure: LUMPECTOMY BREAST KRISTIAN LOCALIZED;  Surgeon: Viola Gaines MD;  [facility];  Service: Surgical Oncology    CARDIAC ELECTROPHYSIOLOGY PROCEDURE N/A 4/16/2024    Procedure: Cardiac eps/aflutter ablation;  Surgeon: Pee Weber MD;  [facility];  Service: Cardiology    CARDIAC ELECTROPHYSIOLOGY PROCEDURE N/A 4/16/2024    Procedure: Cardiac loop recorder implant;  Surgeon: Pee Weber MD;  [facility];  Service: Cardiology    CARDIAC ELECTROPHYSIOLOGY STUDY AND ABLATION      per pt \"reports did not have this surgery\"    CATARACT EXTRACTION Bilateral     EYE SURGERY Left     retina surgery at [facility] eye    MOHS SURGERY Left 11/22/2022    BCC left interauricular ear-Dr Workman--per pt \"a mole removed not in ear-on scalp\"    OVARIAN CYST SURGERY Right 02/02/2018    Procedure: LAPAROSCOPIC PARAOVARIAN  CYSTECTOMY;  Surgeon: " Harley Darling MD;  [facility]: [facility];  Service: Gynecology    WV HYSTEROSCOPY BX ENDOMETRIUM&/POLYPC W/WO D&C N/A 3/20/2024    Procedure: DILATATION & CURETTAGE W/ HYSTEROSCOPY , EXAM UNDER ANESTHESIA;  Surgeon: C William Riedel, DO;  [facility];  Service: Gynecology    SKIN BIOPSY      TONSILLECTOMY      and adenoids    US BREAST CLIP NEEDLE LOC RIGHT Right 07/07/2023    US GUIDED BREAST BIOPSY RIGHT COMPLETE Right 02/02/2023   [4]   Family History  Problem Relation Name Age of Onset    No Known Problems Mother      Heart disease Father      Hypertension Maternal Grandmother      Bone cancer Maternal Grandmother  93    No Known Problems Maternal Grandfather      No Known Problems Paternal Grandmother      No Known Problems Paternal Grandfather      Colon cancer Maternal Uncle  62    Ovarian cancer Paternal Aunt  56    Heart disease Family      Cancer Family      Thyroid disease Family      Substance Abuse Neg Hx      Mental illness Neg Hx      Anesthesia problems Neg Hx      BRCA2 Positive Neg Hx      BRCA2 Negative Neg Hx      BRCA1 Negative Neg Hx      BRCA1 Positive Neg Hx      BRCA 1/2 Neg Hx      Endometrial cancer Neg Hx      Breast cancer additional onset Neg Hx      Breast cancer Neg Hx

## 2025-07-09 DIAGNOSIS — E11.9 TYPE 2 DIABETES MELLITUS WITHOUT COMPLICATION, WITHOUT LONG-TERM CURRENT USE OF INSULIN (HCC): ICD-10-CM

## 2025-07-10 ENCOUNTER — OFFICE VISIT (OUTPATIENT)
Dept: OBGYN CLINIC | Facility: CLINIC | Age: 45
End: 2025-07-10
Payer: COMMERCIAL

## 2025-07-10 VITALS — WEIGHT: 170 LBS | BODY MASS INDEX: 31.28 KG/M2 | HEIGHT: 62 IN

## 2025-07-10 DIAGNOSIS — S76.312A HAMSTRING STRAIN, LEFT, INITIAL ENCOUNTER: Primary | ICD-10-CM

## 2025-07-10 DIAGNOSIS — M17.12 PRIMARY OSTEOARTHRITIS OF LEFT KNEE: ICD-10-CM

## 2025-07-10 DIAGNOSIS — M79.604 PAIN OF RIGHT LOWER EXTREMITY DUE TO INJURY: ICD-10-CM

## 2025-07-10 PROCEDURE — 99244 OFF/OP CNSLTJ NEW/EST MOD 40: CPT | Performed by: STUDENT IN AN ORGANIZED HEALTH CARE EDUCATION/TRAINING PROGRAM

## 2025-07-10 NOTE — PROGRESS NOTES
"[facility] [place]  [address]  [facility] [address]  [facility] [id number] [phone]       Mary Arevalo  [id number]  1980    ORTHOPAEDIC SURGERY OUTPATIENT NOTE  7/10/2025    :  Assessment & Plan  Hamstring strain, left, initial encounter  Primary osteoarthritis of left knee  X-ray of the left knee from 6/12/25 was reviewed with patient  Discussed that likely symptoms are from hamstring strain and patellofemoral syndrome  Recommended physical therapy, she has scheduled session   OTC pain medication and ice as needed  Discussed that with time her symptoms shall improve with physical therapy  Follow up in 4 weeks if no improvement    Orders:    Ambulatory referral to Orthopedic Surgery        Procedures  No procedures today.    Translation: No    HISTORY:  44 y.o. female who is present in office for evaluation of her left knee pain.  She is a  and is up and down all day.  She states that she has been having left knee pain for 4 to 6 months now without any trauma or injury.  Some she denies any numbness or tingling.  She states that her pain is surrounding her patella and mid hamstring but is worse with ambulating and can be rated to be an 8 out of 10 dull achy pain and sometimes throbbing behind her patella.  She states that she originally saw her PCP who referred her to physical therapy, she has appointments scheduled for the middle of this month.  She has not tried any corticosteroid injection or bracing.  Denies any catching locking or buckling of her knee.    Surgical History:  None    Previous Injection(s): none      The following portions of the patient's history were reviewed and updated as appropriate: allergies, current medications, past family history, past social history, past surgical history and problem list.    Ht 5' 2\" (1.575 m)   Wt 77.1 kg (170 lb)   LMP 05/25/2025 (Exact Date)   BMI 31.09 kg/m²    Lab Results   Component Value " Date    HGBA1C 7.4 (H) 2025         Past Medical History[1]    Past Surgical History[2]    Social History     Socioeconomic History    Marital status: /Civil Union     Spouse name: Elliott    Number of children: 0    Years of education: Not on file    Highest education level: Not on file   Occupational History    Occupation:    Tobacco Use    Smoking status: Former     Current packs/day: 0.00     Average packs/day: 1.5 packs/day for 16.0 years (24.0 ttl pk-yrs)     Types: Cigarettes     Start date:      Quit date: 2012     Years since quittin.5    Smokeless tobacco: Never    Tobacco comments:     Former smoker - quit in 2012 -   Vaping Use    Vaping status: Never Used   Substance and Sexual Activity    Alcohol use: Not Currently     Comment: rare--3x/year    Drug use: No     Comment: Denies any drug use per pt    Sexual activity: Yes     Partners: Male     Birth control/protection: None     Comment: Denies any chest pain or shortness of breath with activity   Other Topics Concern    Not on file   Social History Narrative    Always uses seatbelt    Caffeine use     Social Drivers of Health     Financial Resource Strain: Not on file   Food Insecurity: No Food Insecurity (3/20/2025)    Nursing - Inadequate Food Risk Classification     Worried About Running Out of Food in the Last Year: Never true     Ran Out of Food in the Last Year: Never true     Ran Out of Food in the Last Year: Not on file   Transportation Needs: No Transportation Needs (3/20/2025)    PRAPARE - Transportation     Lack of Transportation (Medical): No     Lack of Transportation (Non-Medical): No   Physical Activity: Not on file   Stress: Not on file   Social Connections: Not on file   Intimate Partner Violence: Not on file   Housing Stability: Low Risk  (3/20/2025)    Housing Stability Vital Sign     Unable to Pay for Housing in the Last Year: No     Number of Times Moved in the Last Year: 0     Homeless in the  Last Year: No       Family History[3]     Patient's Medications   New Prescriptions    No medications on file   Previous Medications    ATORVASTATIN (LIPITOR) 20 MG TABLET    Take 1 tablet (20 mg total) by mouth daily    FAMOTIDINE (PEPCID) 20 MG TABLET    Take 20 mg by mouth daily at bedtime as needed for heartburn    FENOFIBRATE (TRICOR) 54 MG TABLET    Take 1 tablet (54 mg total) by mouth daily    GLUCOSE BLOOD (ONETOUCH ULTRA) TEST STRIP    Test once daily    LANCETS (ONETOUCH DELICA PLUS [id number]) MISC    TEST 1-2 TIMES A DAY    SEMAGLUTIDE, 1 MG/DOSE, (OZEMPIC) 2 MG/1.5 ML INJECTION PEN    Inject 0.75 mL (1 mg total) under the skin every 7 days   Modified Medications    No medications on file   Discontinued Medications    No medications on file       Allergies[4]       REVIEW OF SYSTEMS:  Constitutional: Negative.    HEENT: Negative.    Respiratory: Negative.    Skin: Negative.    Neurological: Negative.    Psychiatric/Behavioral: Negative.  Musculoskeletal: Negative except for that mentioned in the HPI.    Gen: No acute distress, resting comfortably in bed  HEENT: Eyes clear, moist mucus membranes, hearing intact  Respiratory: No audible wheezing or stridor  Cardiovascular: Well Perfused peripherally, 2+ distal pulse  Abdomen: nondistended, no peritoneal signs     PHYSICAL EXAM:      Left Knee Exam  Alignment:    genu valgus.  Inspection:  No swelling. No edema. No erythema.  Palpation:  TTP patella and mid hamstring  ROM:  0-120  Strength:  Not tested.  Stability:  (-) Varus instability. (-) Valgus instability. (-) Lachman. (-) Posterior drawer.  Tests:  (-) Pascale.  Patella:  (-) J-sign. (-) Patellar apprehension. (-) Patellar tilt.  Neurovascular:  Sensation intact in DP/SP/[place]/Sa/T nerve distributions. Sensation intact L1-S1 RLE.  2+ DP & PT pulses.  Gait:  Normal.     IMAGING:  XR of left knee - shows no acute osseous abnormality and minimal degenerative changes. I have reviewed the radiology report(s)  "and agree with their impression.          Taryn Akhtar PA-C    Scribe Attestation      I,:  Taryn Akhtar PA-C am acting as a scribe while in the presence of the attending physician.:       I,:  Robe Freire, DO personally performed the services described in this documentation    as scribed in my presence.:               Portions of the record may have been created with voice recognition software.  Occasional wrong word or \"sound a like\" substitutions may have occurred due to the inherent limitations of voice recognition software.  Read the chart carefully and recognize, using context, where substitutions have occurred. All patient's questions were answered to their satisfaction.           [1]   Past Medical History:  Diagnosis Date    Adnexal cyst     left \"fallopian tube\"    Anesthesia     per pt \"sometimes oxygen level drops very low\" not all the time -has occurred more than once under anesthesia\"    Atrial fibrillation (HCC)     [facility] cardio Dr SUZANNA Parkinson--per pt \"did not have ablation and takes flecanide and metoprolol for it\"    Basal cell carcinoma 09/28/2022    left interauricular ear    Dental crowns present     Diabetes mellitus (HCC)     type 2    Elevated LFTs 06/01/2020    GERD (gastroesophageal reflux disease)     GERD (gastroesophageal reflux disease)     Hyperlipidemia     Irregular heart beat     hx of A flutter per pt    Irregular periods     Melanoma (HCC) 2022    s/p Medtronic loop recorder 4/16/2024 04/16/2024    s/p typical atrial flutter ablation 4/16/2024 04/16/2024    Varicella    [2]   Past Surgical History:  Procedure Laterality Date    BREAST LUMPECTOMY Right 07/28/2023    Procedure: LUMPECTOMY BREAST KRISTIAN LOCALIZED;  Surgeon: Viola Gaines MD;  [facility];  Service: Surgical Oncology    CARDIAC ELECTROPHYSIOLOGY PROCEDURE N/A 4/16/2024    Procedure: Cardiac eps/aflutter ablation;  Surgeon: Pee Weber MD;  [facility];  Service: Cardiology    CARDIAC " "ELECTROPHYSIOLOGY PROCEDURE N/A 4/16/2024    Procedure: Cardiac loop recorder implant;  Surgeon: Pee Weber MD;  [facility];  Service: Cardiology    CARDIAC ELECTROPHYSIOLOGY STUDY AND ABLATION      per pt \"reports did not have this surgery\"    CATARACT EXTRACTION Bilateral     EYE SURGERY Left     retina surgery at [facility] eye    MOHS SURGERY Left 11/22/2022    BCC left interauricular ear-Dr Workman--per pt \"a mole removed not in ear-on scalp\"    OVARIAN CYST SURGERY Right 02/02/2018    Procedure: LAPAROSCOPIC PARAOVARIAN  CYSTECTOMY;  Surgeon: Harley Darling MD;  [facility]: [facility];  Service: Gynecology    KS HYSTEROSCOPY BX ENDOMETRIUM&/POLYPC W/WO D&C N/A 3/20/2024    Procedure: DILATATION & CURETTAGE W/ HYSTEROSCOPY , EXAM UNDER ANESTHESIA;  Surgeon: C William Riedel, DO;  [facility];  Service: Gynecology    SKIN BIOPSY      TONSILLECTOMY      and adenoids    US BREAST CLIP NEEDLE LOC RIGHT Right 07/07/2023    US GUIDED BREAST BIOPSY RIGHT COMPLETE Right 02/02/2023   [3]   Family History  Problem Relation Name Age of Onset    No Known Problems Mother      Heart disease Father      Hypertension Maternal Grandmother      Bone cancer Maternal Grandmother  93    No Known Problems Maternal Grandfather      No Known Problems Paternal Grandmother      No Known Problems Paternal Grandfather      Colon cancer Maternal Uncle  62    Ovarian cancer Paternal Aunt  56    Heart disease Family      Cancer Family      Thyroid disease Family      Substance Abuse Neg Hx      Mental illness Neg Hx      Anesthesia problems Neg Hx      BRCA2 Positive Neg Hx      BRCA2 Negative Neg Hx      BRCA1 Negative Neg Hx      BRCA1 Positive Neg Hx      BRCA 1/2 Neg Hx      Endometrial cancer Neg Hx      Breast cancer additional onset Neg Hx      Breast cancer Neg Hx     [4]   Allergies  Allergen Reactions    B Complex-C [Allbee-C] Irritability     Had anger issues on the b complex     "

## 2025-07-11 RX ORDER — ATORVASTATIN CALCIUM 20 MG/1
20 TABLET, FILM COATED ORAL DAILY
Qty: 90 TABLET | Refills: 1 | Status: SHIPPED | OUTPATIENT
Start: 2025-07-11

## 2025-07-22 ENCOUNTER — EVALUATION (OUTPATIENT)
Dept: PHYSICAL THERAPY | Facility: CLINIC | Age: 45
End: 2025-07-22
Attending: INTERNAL MEDICINE
Payer: COMMERCIAL

## 2025-07-22 DIAGNOSIS — I89.0 LYMPHEDEMA OF LEFT LOWER EXTREMITY: ICD-10-CM

## 2025-07-22 DIAGNOSIS — M54.16 LUMBAR RADICULAR PAIN: Primary | ICD-10-CM

## 2025-07-22 PROCEDURE — 97110 THERAPEUTIC EXERCISES: CPT | Performed by: PHYSICAL THERAPIST

## 2025-07-22 PROCEDURE — 97162 PT EVAL MOD COMPLEX 30 MIN: CPT | Performed by: PHYSICAL THERAPIST

## 2025-07-22 NOTE — PROGRESS NOTES
PT Evaluation     Today's date: 2025  Patient name: Mary Arevalo  : 1980  MRN: [id number]  Referring provider: Deidre Boss DO  Dx:   Encounter Diagnosis     ICD-10-CM    1. Lymphedema of left lower extremity  I89.0 Ambulatory Referral to PT/OT Lymphedema Therapy                     Assessment  Impairments: abnormal or restricted ROM, activity intolerance, impaired physical strength, lacks appropriate home exercise program and pain with function    Assessment details: Pt is a 44 y.o. year old female coming to outpatient PT with L lumbar radiculopathy. Pt presents with increased pain and TTP in L medial and posterior thigh, decreased lumbar ROM, no evidence L LE lymphedema,  and overall decreased functional mobility. Pt would benefit from skilled PT services in order to address these deficits and reach maximum level of function. Thank you kindly for the referral!    Pt was issued a written HEP to be performed on a daily basis.   Understanding of Dx/Px/POC: good     Prognosis: good    Goals  STG's ( 3-4 weeks)  1. Pt will be independent in HEP  2. PT will have improved HS flexibility by 15*  LTG's ( 6- 8 weeks)  1. Pt will have improved posture and body mechanics for work activities and lifting  2. Pt will have decreased pain to 1/10 at worst  3. Pt will have improved tolerance for sitting activities with less pain.    Plan  Patient would benefit from: PT eval and skilled physical therapy  Planned modality interventions: thermotherapy: hydrocollator packs    Planned therapy interventions: manual therapy, neuromuscular re-education, stretching, strengthening, therapeutic activities, therapeutic exercise, functional ROM exercises, flexibility and home exercise program    Frequency: 2x week  Duration in weeks: 8  Plan of Care beginning date: 2025  Plan of Care expiration date: 2025  Treatment plan discussed with: PTA and patient        Subjective Evaluation    History of Present Illness  Mechanism of  "injury: Pt reports L thigh pain onset about 6 months ago. PT thought it was due to the way she was sitting and the pain does not go away. PCP was concerned about a blood clot. Doppler US showed \"There is an echogenic non-vascularized structure located in the inguinal region measuring approximately 2.28 cm which may be suggestive of enlarged lymphatic channels. '     Pt has pain in anterior patellar region when walking. Pt continues to have posterior leg pain when waking up, laying down, going up and down the steps. and occurs randomly throughout the day. Pt has tenderness to touch, which is new.    Work; cleans commercial properties.  Hobbies; reading  Patient Goals  Patient goals for therapy: decreased pain and independence with ADLs/IADLs  Patient goal: to return to normal  Pain  At best pain ratin  At worst pain ratin  Location: L posterior thigh- can feel hot  Quality: throbbing  Relieving factors: heat    Social Support  Steps to enter house: yes (1)  Stairs in house: yes (13 steps; has stair glide)   Lives with: spouse    Employment status: working  Treatments  No previous or current treatments        Objective     Neurological Testing     Sensation     Lumbar   Left   Intact: light touch    Right   Intact: light touch    Reflexes   Left   Patellar (L4): normal (2+)  Achilles (S1): normal (2+)    Right   Patellar (L4): normal (2+)  Achilles (S1): normal (2+)    Active Range of Motion     Lumbar   Flexion: 75 degrees   Extension: 25 degrees  with pain  Left lateral flexion: 20 degrees    WFL  Right lateral flexion: 25 degrees   Left rotation:  Restriction level: moderate  Right rotation:  Restriction level: moderate    Additional Active Range of Motion Details  In standing: symmetrical iliac crest and PSIS levels  (+) TTP L medial hip adductor region  HS flexibility; R; 90*   L: 70* with opposite knee flexed  (+) piriformis tightness L > R  (+) slump test L LE  (+) hypomobility with lumbar PAIVM " testing  (-) SKTC  Increased L LE muscle tone adductor and medial hamstring  Pain with prone press up  EIS: reduced LBP, leg pain unchanged    (-) stemmer sign L LE  No visible edema L LE   (-) buffalo hump  (-) L LE columnar in appearance    Strength/Myotome Testing     Left Hip   Planes of Motion   Flexion: 4+  Extension: 4  Abduction: 5  External rotation: 5  Internal rotation: 5    Right Hip   Planes of Motion   Flexion: 4+  Extension: 4  Abduction: 5  External rotation: 5  Internal rotation: 5    Left Knee   Flexion: 5  Extension: 5    Right Knee   Flexion: 5  Extension: 5    Left Ankle/Foot   Dorsiflexion: 5    Right Ankle/Foot   Dorsiflexion: 5            Daily Treatment Diary     Precautions: cardiac  CO-MORBIDITIES:  TO MD On:   FOTO Completed On:     POC Expires Reeval for Medicare to be completed  Unit Limit Auth Expiration Date PT/OT/STVisit Limit   9/16/25 By visit n/a N/a N/a 24    Completed on visit                    Auth Status DATE 7/22        Approved Visit # 1         Remaining 23        MANUAL THERAPY                  L adductor/ medial HS MFR                                             THERAPEUTIC EXERCISE HEP 8' written        Bike for LE ROM          Lumbar trunk extension in standing          Supine butterfly stretch          Supine HS stretch          Piriformis stretch                                                                                                              NEUROMUSCULAR REEDUCATION           Standing bird dog          DLS: bridges          DLS; SLR          DLS; clamshells          Supine sciatic n slider w/ ankle pump                                                                                                    THERAPEUTIC ACTIVITY          Posture and body mechanics educ                                        GAIT TRAINING                                                  MODALITIES

## 2025-07-29 ENCOUNTER — OFFICE VISIT (OUTPATIENT)
Dept: PHYSICAL THERAPY | Facility: CLINIC | Age: 45
End: 2025-07-29
Attending: INTERNAL MEDICINE
Payer: COMMERCIAL

## 2025-07-29 DIAGNOSIS — I89.0 LYMPHEDEMA OF LEFT LOWER EXTREMITY: ICD-10-CM

## 2025-07-29 DIAGNOSIS — M54.16 LUMBAR RADICULAR PAIN: Primary | ICD-10-CM

## 2025-07-29 PROCEDURE — 97112 NEUROMUSCULAR REEDUCATION: CPT

## 2025-07-29 PROCEDURE — 97140 MANUAL THERAPY 1/> REGIONS: CPT

## 2025-07-29 PROCEDURE — 97110 THERAPEUTIC EXERCISES: CPT

## 2025-07-31 ENCOUNTER — OFFICE VISIT (OUTPATIENT)
Dept: PHYSICAL THERAPY | Facility: CLINIC | Age: 45
End: 2025-07-31
Attending: INTERNAL MEDICINE
Payer: COMMERCIAL

## 2025-07-31 DIAGNOSIS — I89.0 LYMPHEDEMA OF LEFT LOWER EXTREMITY: ICD-10-CM

## 2025-07-31 DIAGNOSIS — M54.16 LUMBAR RADICULAR PAIN: Primary | ICD-10-CM

## 2025-07-31 PROCEDURE — 97112 NEUROMUSCULAR REEDUCATION: CPT

## 2025-07-31 PROCEDURE — 97110 THERAPEUTIC EXERCISES: CPT

## 2025-07-31 PROCEDURE — 97140 MANUAL THERAPY 1/> REGIONS: CPT

## 2025-08-05 ENCOUNTER — OFFICE VISIT (OUTPATIENT)
Dept: PHYSICAL THERAPY | Facility: CLINIC | Age: 45
End: 2025-08-05
Attending: INTERNAL MEDICINE
Payer: COMMERCIAL

## 2025-08-05 DIAGNOSIS — M54.16 LUMBAR RADICULAR PAIN: Primary | ICD-10-CM

## 2025-08-05 DIAGNOSIS — I89.0 LYMPHEDEMA OF LEFT LOWER EXTREMITY: ICD-10-CM

## 2025-08-05 PROCEDURE — 97110 THERAPEUTIC EXERCISES: CPT

## 2025-08-05 PROCEDURE — 97112 NEUROMUSCULAR REEDUCATION: CPT

## 2025-08-05 PROCEDURE — 97140 MANUAL THERAPY 1/> REGIONS: CPT

## 2025-08-07 ENCOUNTER — OFFICE VISIT (OUTPATIENT)
Dept: PHYSICAL THERAPY | Facility: CLINIC | Age: 45
End: 2025-08-07
Attending: INTERNAL MEDICINE
Payer: COMMERCIAL

## 2025-08-07 DIAGNOSIS — M54.16 LUMBAR RADICULAR PAIN: Primary | ICD-10-CM

## 2025-08-07 PROCEDURE — 97110 THERAPEUTIC EXERCISES: CPT | Performed by: PHYSICAL THERAPIST

## 2025-08-07 PROCEDURE — 97112 NEUROMUSCULAR REEDUCATION: CPT | Performed by: PHYSICAL THERAPIST

## 2025-08-07 PROCEDURE — 97140 MANUAL THERAPY 1/> REGIONS: CPT | Performed by: PHYSICAL THERAPIST

## 2025-08-12 ENCOUNTER — OFFICE VISIT (OUTPATIENT)
Dept: PHYSICAL THERAPY | Facility: CLINIC | Age: 45
End: 2025-08-12
Attending: INTERNAL MEDICINE
Payer: COMMERCIAL

## 2025-08-14 ENCOUNTER — OFFICE VISIT (OUTPATIENT)
Dept: PHYSICAL THERAPY | Facility: CLINIC | Age: 45
End: 2025-08-14
Attending: INTERNAL MEDICINE
Payer: COMMERCIAL

## 2025-08-21 ENCOUNTER — OFFICE VISIT (OUTPATIENT)
Dept: OBGYN CLINIC | Facility: CLINIC | Age: 45
End: 2025-08-21
Payer: COMMERCIAL

## 2025-08-21 ENCOUNTER — EVALUATION (OUTPATIENT)
Dept: PHYSICAL THERAPY | Facility: CLINIC | Age: 45
End: 2025-08-21
Attending: INTERNAL MEDICINE
Payer: COMMERCIAL

## 2025-08-21 VITALS — HEIGHT: 62 IN | WEIGHT: 170.2 LBS | BODY MASS INDEX: 31.32 KG/M2

## 2025-08-21 DIAGNOSIS — M54.16 LUMBAR RADICULAR PAIN: Primary | ICD-10-CM

## 2025-08-21 DIAGNOSIS — S76.312A HAMSTRING STRAIN, LEFT, INITIAL ENCOUNTER: Primary | ICD-10-CM

## 2025-08-21 PROCEDURE — 97112 NEUROMUSCULAR REEDUCATION: CPT | Performed by: PHYSICAL THERAPIST

## 2025-08-21 PROCEDURE — 99213 OFFICE O/P EST LOW 20 MIN: CPT | Performed by: STUDENT IN AN ORGANIZED HEALTH CARE EDUCATION/TRAINING PROGRAM

## 2025-08-21 PROCEDURE — 97140 MANUAL THERAPY 1/> REGIONS: CPT | Performed by: PHYSICAL THERAPIST

## 2025-08-21 PROCEDURE — 97110 THERAPEUTIC EXERCISES: CPT | Performed by: PHYSICAL THERAPIST

## 2025-08-21 RX ORDER — SEMAGLUTIDE 1.34 MG/ML
1 INJECTION, SOLUTION SUBCUTANEOUS
COMMUNITY
Start: 2025-08-07

## (undated) DEVICE — TUBING SUCTION 5MM X 12 FT

## (undated) DEVICE — ENDOPOUCH RETRIEVER SPECIMEN RETRIEVAL BAGS: Brand: ENDOPOUCH RETRIEVER

## (undated) DEVICE — LAP AEM SCISSOR TIP .75 IN

## (undated) DEVICE — SHEATH, GUIDE, SAVI SCOUT®: Brand: SAVI SCOUT®

## (undated) DEVICE — EXIDINE 4 PCT

## (undated) DEVICE — DRAPE EQUIPMENT RF WAND

## (undated) DEVICE — IRRIG ENDO FLO TUBING

## (undated) DEVICE — Device: Brand: DECANAV

## (undated) DEVICE — GLOVE SRG BIOGEL 7

## (undated) DEVICE — ENDOPATH XCEL BLADELESS TROCARS WITH STABILITY SLEEVES: Brand: ENDOPATH XCEL

## (undated) DEVICE — GLOVE SRG BIOGEL ECLIPSE 7.5

## (undated) DEVICE — INTENDED FOR TISSUE SEPARATION, AND OTHER PROCEDURES THAT REQUIRE A SHARP SURGICAL BLADE TO PUNCTURE OR CUT.: Brand: BARD-PARKER SAFETY BLADES SIZE 11, STERILE

## (undated) DEVICE — PINNACLE INTRODUCER SHEATH: Brand: PINNACLE

## (undated) DEVICE — UNDER BUTTOCKS DRAPE W/FLUID CONTROL POUCH: Brand: CONVERTORS

## (undated) DEVICE — ADHESIVE SKIN HIGH VISCOSITY EXOFIN 1ML

## (undated) DEVICE — PLUMEPEN PRO 10FT

## (undated) DEVICE — SUT MONOCRYL 4-0 PS-2 27 IN Y426H

## (undated) DEVICE — SUPER SPONGES,MEDIUM: Brand: KERLIX

## (undated) DEVICE — GLOVE INDICATOR PI UNDERGLOVE SZ 8 BLUE

## (undated) DEVICE — SUT SILK 2-0 SH 30 IN K833H

## (undated) DEVICE — GLOVE SRG BIOGEL 6

## (undated) DEVICE — BETHLEHEM UNIVERSAL MINOR GEN: Brand: CARDINAL HEALTH

## (undated) DEVICE — BRA SURGICAL SZ XLGE (39-42)

## (undated) DEVICE — SKIN MARKER DUAL TIP WITH RULER CAP, FLEXIBLE RULER AND LABELS: Brand: DEVON

## (undated) DEVICE — SCD SEQUENTIAL COMPRESSION COMFORT SLEEVE MEDIUM KNEE LENGTH: Brand: KENDALL SCD

## (undated) DEVICE — PVC URETHRAL CATHETER: Brand: DOVER

## (undated) DEVICE — GLOVE SRG BIOGEL 7.5

## (undated) DEVICE — ENDOPATH 5MM CURVED SCISSORS WITH MONOPOLAR CAUTERY: Brand: ENDOPATH

## (undated) DEVICE — GLOVE INDICATOR PI UNDERGLOVE SZ 7.5 BLUE

## (undated) DEVICE — IV EXTENSION TUBING 33 IN

## (undated) DEVICE — PREMIUM DRY TRAY LF: Brand: MEDLINE INDUSTRIES, INC.

## (undated) DEVICE — GLOVE INDICATOR PI UNDERGLOVE SZ 7 BLUE

## (undated) DEVICE — SOUNDSTAR ECO 8F G CATHETER: Brand: SOUNDSTAR

## (undated) DEVICE — CYSTO TUBING SINGLE IRRIGATION

## (undated) DEVICE — Device: Brand: REFERENCE PATCH CARTO 3

## (undated) DEVICE — ENDOPATH XCEL UNIVERSAL TROCAR STABLILITY SLEEVES: Brand: ENDOPATH XCEL

## (undated) DEVICE — 2000CC GUARDIAN II: Brand: GUARDIAN

## (undated) DEVICE — DRAPE SHEET THREE QUARTER

## (undated) DEVICE — MEDI-VAC YANKAUER SUCTION HANDLE W/BULBOUS AND CONTROL VENT: Brand: CARDINAL HEALTH

## (undated) DEVICE — SUT MONOCRYL 3-0 SH 27 IN Y416H

## (undated) DEVICE — SPONGE STICK WITH PVP-I: Brand: KENDALL

## (undated) DEVICE — WET SKIN PREP TRAY: Brand: MEDLINE INDUSTRIES, INC.

## (undated) DEVICE — SUT MONOCRYL 3-0 PS-2 27 IN Y427H

## (undated) DEVICE — GLOVE PI ULTRA TOUCH SZ.7.5

## (undated) DEVICE — UNIVERSAL GYN LAPAROSCOPY,KIT: Brand: CARDINAL HEALTH

## (undated) DEVICE — STRL ALLENTOWN HYSTEROSCOPY PK: Brand: CARDINAL HEALTH

## (undated) DEVICE — CATH EP  INQUIRY 6F 2-5-2MM  MED CRV STERABLE  DECAPOLAR 110CM

## (undated) DEVICE — NEEDLE 25G X 1 1/2

## (undated) DEVICE — CATH EP 5FR QUAD SUPREME JSN